# Patient Record
Sex: FEMALE | Race: BLACK OR AFRICAN AMERICAN | NOT HISPANIC OR LATINO | Employment: OTHER | URBAN - METROPOLITAN AREA
[De-identification: names, ages, dates, MRNs, and addresses within clinical notes are randomized per-mention and may not be internally consistent; named-entity substitution may affect disease eponyms.]

---

## 2019-10-28 DIAGNOSIS — I89.0 LYMPHEDEMA: Primary | ICD-10-CM

## 2021-02-09 ENCOUNTER — IMMUNIZATION (OUTPATIENT)
Dept: FAMILY MEDICINE | Facility: CLINIC | Age: 71
End: 2021-02-09
Payer: MEDICARE

## 2021-02-09 DIAGNOSIS — Z23 NEED FOR VACCINATION: Primary | ICD-10-CM

## 2021-02-09 PROCEDURE — 91300 COVID-19, MRNA, LNP-S, PF, 30 MCG/0.3 ML DOSE VACCINE: CPT | Mod: PBBFAC | Performed by: FAMILY MEDICINE

## 2021-02-18 ENCOUNTER — CLINICAL SUPPORT (OUTPATIENT)
Dept: CARDIOLOGY | Facility: HOSPITAL | Age: 71
DRG: 177 | End: 2021-02-18
Attending: INTERNAL MEDICINE
Payer: MEDICARE

## 2021-02-18 ENCOUNTER — HOSPITAL ENCOUNTER (INPATIENT)
Facility: HOSPITAL | Age: 71
LOS: 1 days | Discharge: HOME | DRG: 177 | End: 2021-02-19
Attending: EMERGENCY MEDICINE | Admitting: INTERNAL MEDICINE
Payer: MEDICARE

## 2021-02-18 DIAGNOSIS — R07.9 CHEST PAIN: ICD-10-CM

## 2021-02-18 DIAGNOSIS — U07.1 COVID-19 VIRUS DETECTED: ICD-10-CM

## 2021-02-18 DIAGNOSIS — R55 SYNCOPE, UNSPECIFIED SYNCOPE TYPE: Primary | ICD-10-CM

## 2021-02-18 DIAGNOSIS — J18.9 BILATERAL PNEUMONIA: ICD-10-CM

## 2021-02-18 DIAGNOSIS — U07.1 COVID-19 VIRUS INFECTION: ICD-10-CM

## 2021-02-18 DIAGNOSIS — R55 SYNCOPE: ICD-10-CM

## 2021-02-18 PROBLEM — E78.5 HYPERLIPIDEMIA: Status: ACTIVE | Noted: 2021-02-18

## 2021-02-18 PROBLEM — I10 ESSENTIAL HYPERTENSION: Status: ACTIVE | Noted: 2021-02-18

## 2021-02-18 PROBLEM — E87.6 HYPOKALEMIA: Status: ACTIVE | Noted: 2021-02-18

## 2021-02-18 LAB
ALBUMIN SERPL BCP-MCNC: 3.4 G/DL (ref 3.5–5.2)
ALBUMIN SERPL BCP-MCNC: ABNORMAL G/DL
ALP SERPL-CCNC: 82 U/L (ref 55–135)
ALT SERPL W/O P-5'-P-CCNC: 26 U/L (ref 10–44)
ALT SERPL W/O P-5'-P-CCNC: ABNORMAL U/L
ANION GAP SERPL CALC-SCNC: 12 MMOL/L (ref 8–16)
AST SERPL-CCNC: 32 U/L (ref 10–40)
BASOPHILS # BLD AUTO: 0.01 K/UL (ref 0–0.2)
BASOPHILS NFR BLD: 0.3 % (ref 0–1.9)
BILIRUB SERPL-MCNC: 0.9 MG/DL (ref 0.1–1)
BILIRUB SERPL-MCNC: ABNORMAL MG/DL
BNP SERPL-MCNC: 23 PG/ML (ref 0–99)
BUN SERPL-MCNC: 14 MG/DL (ref 8–23)
BUN SERPL-MCNC: ABNORMAL MG/DL
CALCIUM SERPL-MCNC: 8.5 MG/DL (ref 8.7–10.5)
CHLORIDE SERPL-SCNC: 99 MMOL/L (ref 95–110)
CK MB SERPL-MCNC: 1.3 NG/ML (ref 0.1–6.5)
CK SERPL-CCNC: 115 U/L (ref 20–180)
CO2 SERPL-SCNC: 20 MMOL/L (ref 23–29)
CREAT SERPL-MCNC: 0.9 MG/DL (ref 0.5–1.4)
CREAT SERPL-MCNC: ABNORMAL MG/DL
D DIMER PPP IA.FEU-MCNC: 12.29 UG/ML FEU
DIFFERENTIAL METHOD: ABNORMAL
EOSINOPHIL # BLD AUTO: 0 K/UL (ref 0–0.5)
EOSINOPHIL NFR BLD: 0.3 % (ref 0–8)
ERYTHROCYTE [DISTWIDTH] IN BLOOD BY AUTOMATED COUNT: 13.6 % (ref 11.5–14.5)
EST. GFR  (AFRICAN AMERICAN): >60 ML/MIN/1.73 M^2
EST. GFR  (NON AFRICAN AMERICAN): >60 ML/MIN/1.73 M^2
GLUCOSE SERPL-MCNC: 101 MG/DL (ref 70–110)
HCT VFR BLD AUTO: 42.9 % (ref 37–48.5)
HGB BLD-MCNC: 14.1 G/DL (ref 12–16)
IMM GRANULOCYTES # BLD AUTO: 0.03 K/UL (ref 0–0.04)
IMM GRANULOCYTES NFR BLD AUTO: 0.9 % (ref 0–0.5)
LYMPHOCYTES # BLD AUTO: 0.7 K/UL (ref 1–4.8)
LYMPHOCYTES NFR BLD: 20.5 % (ref 18–48)
MCH RBC QN AUTO: 29 PG (ref 27–31)
MCHC RBC AUTO-ENTMCNC: 32.9 G/DL (ref 32–36)
MCV RBC AUTO: 88 FL (ref 82–98)
MONOCYTES # BLD AUTO: 0.2 K/UL (ref 0.3–1)
MONOCYTES NFR BLD: 4.8 % (ref 4–15)
NEUTROPHILS # BLD AUTO: 2.6 K/UL (ref 1.8–7.7)
NEUTROPHILS NFR BLD: 73.2 % (ref 38–73)
NRBC BLD-RTO: 0 /100 WBC
PLATELET # BLD AUTO: 153 K/UL (ref 150–350)
PMV BLD AUTO: 11.5 FL (ref 9.2–12.9)
POTASSIUM SERPL-SCNC: 3.4 MMOL/L (ref 3.5–5.1)
PROT SERPL-MCNC: 7.7 G/DL (ref 6–8.4)
PROT SERPL-MCNC: ABNORMAL G/DL
RBC # BLD AUTO: 4.87 M/UL (ref 4–5.4)
SARS-COV-2 RDRP RESP QL NAA+PROBE: POSITIVE
SODIUM SERPL-SCNC: 131 MMOL/L (ref 136–145)
TROPONIN I SERPL DL<=0.01 NG/ML-MCNC: <0.03 NG/ML
TROPONIN I SERPL DL<=0.01 NG/ML-MCNC: <0.03 NG/ML
TSH SERPL DL<=0.005 MIU/L-ACNC: 1.12 UIU/ML (ref 0.34–5.6)
WBC # BLD AUTO: 3.52 K/UL (ref 3.9–12.7)

## 2021-02-18 PROCEDURE — 84443 ASSAY THYROID STIM HORMONE: CPT

## 2021-02-18 PROCEDURE — 36415 COLL VENOUS BLD VENIPUNCTURE: CPT

## 2021-02-18 PROCEDURE — 93010 EKG 12-LEAD: ICD-10-PCS | Mod: ,,, | Performed by: INTERNAL MEDICINE

## 2021-02-18 PROCEDURE — 21400001 HC TELEMETRY ROOM

## 2021-02-18 PROCEDURE — U0002 COVID-19 LAB TEST NON-CDC: HCPCS

## 2021-02-18 PROCEDURE — 85025 COMPLETE CBC W/AUTO DIFF WBC: CPT

## 2021-02-18 PROCEDURE — 94761 N-INVAS EAR/PLS OXIMETRY MLT: CPT

## 2021-02-18 PROCEDURE — 85379 FIBRIN DEGRADATION QUANT: CPT

## 2021-02-18 PROCEDURE — 82565 ASSAY OF CREATININE: CPT

## 2021-02-18 PROCEDURE — 83880 ASSAY OF NATRIURETIC PEPTIDE: CPT

## 2021-02-18 PROCEDURE — 63600175 PHARM REV CODE 636 W HCPCS: Performed by: INTERNAL MEDICINE

## 2021-02-18 PROCEDURE — 25000242 PHARM REV CODE 250 ALT 637 W/ HCPCS: Performed by: INTERNAL MEDICINE

## 2021-02-18 PROCEDURE — 25500020 PHARM REV CODE 255: Performed by: INTERNAL MEDICINE

## 2021-02-18 PROCEDURE — 82553 CREATINE MB FRACTION: CPT

## 2021-02-18 PROCEDURE — 84484 ASSAY OF TROPONIN QUANT: CPT | Mod: 91

## 2021-02-18 PROCEDURE — 84484 ASSAY OF TROPONIN QUANT: CPT

## 2021-02-18 PROCEDURE — 93306 ECHO (CUPID ONLY): ICD-10-PCS | Mod: 26,,, | Performed by: SPECIALIST

## 2021-02-18 PROCEDURE — 93005 ELECTROCARDIOGRAM TRACING: CPT | Performed by: INTERNAL MEDICINE

## 2021-02-18 PROCEDURE — 99285 EMERGENCY DEPT VISIT HI MDM: CPT | Mod: 25

## 2021-02-18 PROCEDURE — 82550 ASSAY OF CK (CPK): CPT

## 2021-02-18 PROCEDURE — 93010 ELECTROCARDIOGRAM REPORT: CPT | Mod: ,,, | Performed by: INTERNAL MEDICINE

## 2021-02-18 PROCEDURE — 84520 ASSAY OF UREA NITROGEN: CPT

## 2021-02-18 PROCEDURE — 80053 COMPREHEN METABOLIC PANEL: CPT

## 2021-02-18 PROCEDURE — 82247 BILIRUBIN TOTAL: CPT

## 2021-02-18 PROCEDURE — 25000003 PHARM REV CODE 250: Performed by: EMERGENCY MEDICINE

## 2021-02-18 PROCEDURE — 99900035 HC TECH TIME PER 15 MIN (STAT)

## 2021-02-18 PROCEDURE — 84155 ASSAY OF PROTEIN SERUM: CPT

## 2021-02-18 PROCEDURE — 25000003 PHARM REV CODE 250: Performed by: INTERNAL MEDICINE

## 2021-02-18 PROCEDURE — 84460 ALANINE AMINO (ALT) (SGPT): CPT

## 2021-02-18 PROCEDURE — 93306 TTE W/DOPPLER COMPLETE: CPT | Mod: 26,,, | Performed by: SPECIALIST

## 2021-02-18 PROCEDURE — 99900031 HC PATIENT EDUCATION (STAT)

## 2021-02-18 PROCEDURE — 93306 TTE W/DOPPLER COMPLETE: CPT

## 2021-02-18 PROCEDURE — 82040 ASSAY OF SERUM ALBUMIN: CPT

## 2021-02-18 RX ORDER — ENOXAPARIN SODIUM 100 MG/ML
40 INJECTION SUBCUTANEOUS EVERY 24 HOURS
Status: DISCONTINUED | OUTPATIENT
Start: 2021-02-18 | End: 2021-02-18

## 2021-02-18 RX ORDER — ENOXAPARIN SODIUM 100 MG/ML
1 INJECTION SUBCUTANEOUS
Status: DISCONTINUED | OUTPATIENT
Start: 2021-02-18 | End: 2021-02-19 | Stop reason: HOSPADM

## 2021-02-18 RX ORDER — ONDANSETRON 2 MG/ML
4 INJECTION INTRAMUSCULAR; INTRAVENOUS EVERY 8 HOURS PRN
Status: DISCONTINUED | OUTPATIENT
Start: 2021-02-18 | End: 2021-02-19 | Stop reason: HOSPADM

## 2021-02-18 RX ORDER — ENOXAPARIN SODIUM 300 MG/3ML
1 INJECTION INTRAVENOUS; SUBCUTANEOUS
Status: DISCONTINUED | OUTPATIENT
Start: 2021-02-18 | End: 2021-02-18

## 2021-02-18 RX ORDER — OLMESARTAN MEDOXOMIL AND HYDROCHLOROTHIAZIDE 20/12.5 20; 12.5 MG/1; MG/1
1 TABLET ORAL DAILY
COMMUNITY

## 2021-02-18 RX ORDER — ACETAMINOPHEN 325 MG/1
650 TABLET ORAL EVERY 4 HOURS PRN
Status: DISCONTINUED | OUTPATIENT
Start: 2021-02-18 | End: 2021-02-19 | Stop reason: HOSPADM

## 2021-02-18 RX ORDER — TRAMADOL HYDROCHLORIDE 50 MG/1
50 TABLET ORAL EVERY 6 HOURS PRN
Status: DISCONTINUED | OUTPATIENT
Start: 2021-02-18 | End: 2021-02-19 | Stop reason: HOSPADM

## 2021-02-18 RX ORDER — ACETAMINOPHEN 325 MG/1
650 TABLET ORAL EVERY 8 HOURS PRN
Status: DISCONTINUED | OUTPATIENT
Start: 2021-02-18 | End: 2021-02-19 | Stop reason: HOSPADM

## 2021-02-18 RX ORDER — ENOXAPARIN SODIUM 300 MG/3ML
110 INJECTION INTRAVENOUS; SUBCUTANEOUS
Status: DISCONTINUED | OUTPATIENT
Start: 2021-02-18 | End: 2021-02-18

## 2021-02-18 RX ORDER — SIMVASTATIN 20 MG/1
20 TABLET, FILM COATED ORAL NIGHTLY
COMMUNITY
End: 2021-02-25

## 2021-02-18 RX ADMIN — IOHEXOL 100 ML: 350 INJECTION, SOLUTION INTRAVENOUS at 03:02

## 2021-02-18 RX ADMIN — TRAMADOL HYDROCHLORIDE 50 MG: 50 TABLET, FILM COATED ORAL at 02:02

## 2021-02-18 RX ADMIN — POTASSIUM BICARBONATE 50 MEQ: 977.5 TABLET, EFFERVESCENT ORAL at 11:02

## 2021-02-18 RX ADMIN — DEXAMETHASONE 10 MG: 4 TABLET ORAL at 04:02

## 2021-02-18 RX ADMIN — ENOXAPARIN SODIUM 110 MG: 100 INJECTION SUBCUTANEOUS at 05:02

## 2021-02-19 VITALS
WEIGHT: 229.5 LBS | RESPIRATION RATE: 20 BRPM | OXYGEN SATURATION: 96 % | BODY MASS INDEX: 38.24 KG/M2 | DIASTOLIC BLOOD PRESSURE: 75 MMHG | HEIGHT: 65 IN | SYSTOLIC BLOOD PRESSURE: 147 MMHG | TEMPERATURE: 98 F | HEART RATE: 81 BPM

## 2021-02-19 DIAGNOSIS — U07.1 COVID-19 VIRUS DETECTED: ICD-10-CM

## 2021-02-19 PROBLEM — J18.9 BILATERAL PNEUMONIA: Status: RESOLVED | Noted: 2021-02-18 | Resolved: 2021-02-19

## 2021-02-19 PROBLEM — E87.6 HYPOKALEMIA: Status: RESOLVED | Noted: 2021-02-18 | Resolved: 2021-02-19

## 2021-02-19 PROBLEM — R55 SYNCOPE: Status: RESOLVED | Noted: 2021-02-18 | Resolved: 2021-02-19

## 2021-02-19 LAB
ANION GAP SERPL CALC-SCNC: 12 MMOL/L (ref 8–16)
AORTIC ROOT ANNULUS: 2.53 CM
AORTIC VALVE CUSP SEPERATION: 1.67 CM
AV INDEX (PROSTH): 0.97
AV MEAN GRADIENT: 5 MMHG
AV PEAK GRADIENT: 8 MMHG
AV VALVE AREA: 3.03 CM2
AV VELOCITY RATIO: 83.76
BASOPHILS # BLD AUTO: 0.01 K/UL (ref 0–0.2)
BASOPHILS NFR BLD: 0.4 % (ref 0–1.9)
BSA FOR ECHO PROCEDURE: 2.2 M2
BUN SERPL-MCNC: 17 MG/DL (ref 8–23)
CALCIUM SERPL-MCNC: 8.8 MG/DL (ref 8.7–10.5)
CHLORIDE SERPL-SCNC: 99 MMOL/L (ref 95–110)
CO2 SERPL-SCNC: 22 MMOL/L (ref 23–29)
CREAT SERPL-MCNC: 0.9 MG/DL (ref 0.5–1.4)
CV ECHO LV RWT: 0.44 CM
D DIMER PPP IA.FEU-MCNC: 2.99 UG/ML FEU
DIFFERENTIAL METHOD: ABNORMAL
DOP CALC AO PEAK VEL: 1.43 M/S
DOP CALC AO VTI: 24.31 CM
DOP CALC LVOT AREA: 3.1 CM2
DOP CALC LVOT DIAMETER: 2 CM
DOP CALC LVOT PEAK VEL: 119.78 M/S
DOP CALC LVOT STROKE VOLUME: 73.66 CM3
DOP CALCLVOT PEAK VEL VTI: 23.46 CM
E WAVE DECELERATION TIME: 293.61 MSEC
E/A RATIO: 0.59
E/E' RATIO: 6.67 M/S
ECHO LV POSTERIOR WALL: 0.81 CM (ref 0.6–1.1)
EOSINOPHIL # BLD AUTO: 0 K/UL (ref 0–0.5)
EOSINOPHIL NFR BLD: 0 % (ref 0–8)
ERYTHROCYTE [DISTWIDTH] IN BLOOD BY AUTOMATED COUNT: 13.4 % (ref 11.5–14.5)
EST. GFR  (AFRICAN AMERICAN): >60 ML/MIN/1.73 M^2
EST. GFR  (NON AFRICAN AMERICAN): >60 ML/MIN/1.73 M^2
FRACTIONAL SHORTENING: 39 % (ref 28–44)
GLUCOSE SERPL-MCNC: 135 MG/DL (ref 70–110)
HCT VFR BLD AUTO: 40.8 % (ref 37–48.5)
HGB BLD-MCNC: 13.7 G/DL (ref 12–16)
IMM GRANULOCYTES # BLD AUTO: 0.01 K/UL (ref 0–0.04)
IMM GRANULOCYTES NFR BLD AUTO: 0.4 % (ref 0–0.5)
INTERVENTRICULAR SEPTUM: 0.83 CM (ref 0.6–1.1)
IVRT: 78.73 MSEC
LEFT ATRIUM SIZE: 2.5 CM
LEFT INTERNAL DIMENSION IN SYSTOLE: 2.24 CM (ref 2.1–4)
LEFT VENTRICLE DIASTOLIC VOLUME INDEX: 34.32 ML/M2
LEFT VENTRICLE DIASTOLIC VOLUME: 72.07 ML
LEFT VENTRICLE MASS INDEX: 40 G/M2
LEFT VENTRICLE SYSTOLIC VOLUME INDEX: 13.5 ML/M2
LEFT VENTRICLE SYSTOLIC VOLUME: 28.28 ML
LEFT VENTRICULAR INTERNAL DIMENSION IN DIASTOLE: 3.68 CM (ref 3.5–6)
LEFT VENTRICULAR MASS: 84.41 G
LV LATERAL E/E' RATIO: 7.14 M/S
LV SEPTAL E/E' RATIO: 6.25 M/S
LYMPHOCYTES # BLD AUTO: 0.7 K/UL (ref 1–4.8)
LYMPHOCYTES NFR BLD: 29.5 % (ref 18–48)
MAGNESIUM SERPL-MCNC: 2.1 MG/DL (ref 1.6–2.6)
MCH RBC QN AUTO: 29 PG (ref 27–31)
MCHC RBC AUTO-ENTMCNC: 33.6 G/DL (ref 32–36)
MCV RBC AUTO: 86 FL (ref 82–98)
MONOCYTES # BLD AUTO: 0.1 K/UL (ref 0.3–1)
MONOCYTES NFR BLD: 3.8 % (ref 4–15)
MV PEAK A VEL: 0.85 M/S
MV PEAK E VEL: 0.5 M/S
NEUTROPHILS # BLD AUTO: 1.5 K/UL (ref 1.8–7.7)
NEUTROPHILS NFR BLD: 65.9 % (ref 38–73)
NRBC BLD-RTO: 0 /100 WBC
PLATELET # BLD AUTO: 162 K/UL (ref 150–350)
PMV BLD AUTO: 11.1 FL (ref 9.2–12.9)
POTASSIUM SERPL-SCNC: 4.1 MMOL/L (ref 3.5–5.1)
PV PEAK VELOCITY: 96.56 CM/S
RA PRESSURE: 3 MMHG
RBC # BLD AUTO: 4.72 M/UL (ref 4–5.4)
RIGHT VENTRICULAR END-DIASTOLIC DIMENSION: 237 CM
SODIUM SERPL-SCNC: 133 MMOL/L (ref 136–145)
TDI LATERAL: 0.07 M/S
TDI SEPTAL: 0.08 M/S
TDI: 0.08 M/S
WBC # BLD AUTO: 2.34 K/UL (ref 3.9–12.7)

## 2021-02-19 PROCEDURE — 36415 COLL VENOUS BLD VENIPUNCTURE: CPT

## 2021-02-19 PROCEDURE — 99223 1ST HOSP IP/OBS HIGH 75: CPT | Mod: ,,, | Performed by: SPECIALIST

## 2021-02-19 PROCEDURE — 83735 ASSAY OF MAGNESIUM: CPT

## 2021-02-19 PROCEDURE — 25000242 PHARM REV CODE 250 ALT 637 W/ HCPCS: Performed by: INTERNAL MEDICINE

## 2021-02-19 PROCEDURE — 85025 COMPLETE CBC W/AUTO DIFF WBC: CPT

## 2021-02-19 PROCEDURE — 85379 FIBRIN DEGRADATION QUANT: CPT

## 2021-02-19 PROCEDURE — 99900035 HC TECH TIME PER 15 MIN (STAT)

## 2021-02-19 PROCEDURE — 94761 N-INVAS EAR/PLS OXIMETRY MLT: CPT

## 2021-02-19 PROCEDURE — 63600175 PHARM REV CODE 636 W HCPCS: Performed by: INTERNAL MEDICINE

## 2021-02-19 PROCEDURE — 99223 PR INITIAL HOSPITAL CARE,LEVL III: ICD-10-PCS | Mod: ,,, | Performed by: SPECIALIST

## 2021-02-19 PROCEDURE — 80048 BASIC METABOLIC PNL TOTAL CA: CPT

## 2021-02-19 RX ORDER — TRAMADOL HYDROCHLORIDE 50 MG/1
50 TABLET ORAL EVERY 6 HOURS PRN
Qty: 20 EACH | Refills: 0 | Status: ON HOLD | OUTPATIENT
Start: 2021-02-19 | End: 2021-03-02

## 2021-02-19 RX ADMIN — DEXAMETHASONE 10 MG: 4 TABLET ORAL at 08:02

## 2021-02-19 RX ADMIN — ENOXAPARIN SODIUM 110 MG: 100 INJECTION SUBCUTANEOUS at 05:02

## 2021-02-25 ENCOUNTER — HOSPITAL ENCOUNTER (INPATIENT)
Facility: HOSPITAL | Age: 71
LOS: 5 days | Discharge: HOME | DRG: 177 | End: 2021-03-02
Attending: EMERGENCY MEDICINE | Admitting: INTERNAL MEDICINE
Payer: MEDICARE

## 2021-02-25 DIAGNOSIS — J96.01 ACUTE RESPIRATORY FAILURE WITH HYPOXIA: ICD-10-CM

## 2021-02-25 DIAGNOSIS — Z20.822 SUSPECTED COVID-19 VIRUS INFECTION: Primary | ICD-10-CM

## 2021-02-25 DIAGNOSIS — U07.1 PNEUMONIA DUE TO COVID-19 VIRUS: ICD-10-CM

## 2021-02-25 DIAGNOSIS — J12.82 PNEUMONIA DUE TO COVID-19 VIRUS: ICD-10-CM

## 2021-02-25 LAB
ALBUMIN SERPL BCP-MCNC: 2.8 G/DL (ref 3.5–5.2)
ALP SERPL-CCNC: 68 U/L (ref 55–135)
ALT SERPL W/O P-5'-P-CCNC: 24 U/L (ref 10–44)
ANION GAP SERPL CALC-SCNC: 12 MMOL/L (ref 8–16)
AST SERPL-CCNC: 38 U/L (ref 10–40)
BASOPHILS # BLD AUTO: 0.01 K/UL (ref 0–0.2)
BASOPHILS NFR BLD: 0.2 % (ref 0–1.9)
BILIRUB SERPL-MCNC: 0.9 MG/DL (ref 0.1–1)
BNP SERPL-MCNC: 23 PG/ML (ref 0–99)
BUN SERPL-MCNC: 30 MG/DL (ref 8–23)
CALCIUM SERPL-MCNC: 8.1 MG/DL (ref 8.7–10.5)
CHLORIDE SERPL-SCNC: 93 MMOL/L (ref 95–110)
CK SERPL-CCNC: 51 U/L (ref 20–180)
CO2 SERPL-SCNC: 25 MMOL/L (ref 23–29)
CREAT SERPL-MCNC: 1 MG/DL (ref 0.5–1.4)
CRP SERPL-MCNC: 26.97 MG/DL
D DIMER PPP IA.FEU-MCNC: 1 UG/ML FEU
DIFFERENTIAL METHOD: ABNORMAL
EOSINOPHIL # BLD AUTO: 0 K/UL (ref 0–0.5)
EOSINOPHIL NFR BLD: 0.2 % (ref 0–8)
ERYTHROCYTE [DISTWIDTH] IN BLOOD BY AUTOMATED COUNT: 13.7 % (ref 11.5–14.5)
EST. GFR  (AFRICAN AMERICAN): >60 ML/MIN/1.73 M^2
EST. GFR  (NON AFRICAN AMERICAN): 56.8 ML/MIN/1.73 M^2
FERRITIN SERPL-MCNC: 2896 NG/ML (ref 20–300)
GLUCOSE SERPL-MCNC: 114 MG/DL (ref 70–110)
HCT VFR BLD AUTO: 41.2 % (ref 37–48.5)
HGB BLD-MCNC: 13.7 G/DL (ref 12–16)
IMM GRANULOCYTES # BLD AUTO: 0.05 K/UL (ref 0–0.04)
IMM GRANULOCYTES NFR BLD AUTO: 0.9 % (ref 0–0.5)
LACTATE SERPL-SCNC: 1.4 MMOL/L (ref 0.5–1.9)
LDH SERPL L TO P-CCNC: 332 U/L (ref 110–260)
LYMPHOCYTES # BLD AUTO: 1.1 K/UL (ref 1–4.8)
LYMPHOCYTES NFR BLD: 19.4 % (ref 18–48)
MCH RBC QN AUTO: 29.1 PG (ref 27–31)
MCHC RBC AUTO-ENTMCNC: 33.3 G/DL (ref 32–36)
MCV RBC AUTO: 88 FL (ref 82–98)
MONOCYTES # BLD AUTO: 0.2 K/UL (ref 0.3–1)
MONOCYTES NFR BLD: 4.4 % (ref 4–15)
NEUTROPHILS # BLD AUTO: 4.1 K/UL (ref 1.8–7.7)
NEUTROPHILS NFR BLD: 74.9 % (ref 38–73)
NRBC BLD-RTO: 0 /100 WBC
PLATELET # BLD AUTO: 268 K/UL (ref 150–350)
PMV BLD AUTO: 10.4 FL (ref 9.2–12.9)
POTASSIUM SERPL-SCNC: 3.4 MMOL/L (ref 3.5–5.1)
PROCALCITONIN SERPL IA-MCNC: 0.75 NG/ML (ref 0–0.5)
PROT SERPL-MCNC: 7.8 G/DL (ref 6–8.4)
RBC # BLD AUTO: 4.71 M/UL (ref 4–5.4)
SODIUM SERPL-SCNC: 130 MMOL/L (ref 136–145)
TROPONIN I SERPL DL<=0.01 NG/ML-MCNC: <0.03 NG/ML
WBC # BLD AUTO: 5.45 K/UL (ref 3.9–12.7)

## 2021-02-25 PROCEDURE — 82728 ASSAY OF FERRITIN: CPT

## 2021-02-25 PROCEDURE — 84484 ASSAY OF TROPONIN QUANT: CPT

## 2021-02-25 PROCEDURE — 87040 BLOOD CULTURE FOR BACTERIA: CPT

## 2021-02-25 PROCEDURE — 80053 COMPREHEN METABOLIC PANEL: CPT

## 2021-02-25 PROCEDURE — 85379 FIBRIN DEGRADATION QUANT: CPT

## 2021-02-25 PROCEDURE — 83605 ASSAY OF LACTIC ACID: CPT

## 2021-02-25 PROCEDURE — 82550 ASSAY OF CK (CPK): CPT

## 2021-02-25 PROCEDURE — 36415 COLL VENOUS BLD VENIPUNCTURE: CPT

## 2021-02-25 PROCEDURE — 83880 ASSAY OF NATRIURETIC PEPTIDE: CPT

## 2021-02-25 PROCEDURE — 85025 COMPLETE CBC W/AUTO DIFF WBC: CPT

## 2021-02-25 PROCEDURE — 21400001 HC TELEMETRY ROOM

## 2021-02-25 PROCEDURE — 99285 EMERGENCY DEPT VISIT HI MDM: CPT | Mod: 25

## 2021-02-25 PROCEDURE — 83615 LACTATE (LD) (LDH) ENZYME: CPT

## 2021-02-25 PROCEDURE — 86140 C-REACTIVE PROTEIN: CPT

## 2021-02-25 PROCEDURE — 84145 PROCALCITONIN (PCT): CPT

## 2021-02-25 PROCEDURE — 96374 THER/PROPH/DIAG INJ IV PUSH: CPT

## 2021-02-25 PROCEDURE — 63600175 PHARM REV CODE 636 W HCPCS: Performed by: EMERGENCY MEDICINE

## 2021-02-25 RX ORDER — ALBUTEROL SULFATE 90 UG/1
2 AEROSOL, METERED RESPIRATORY (INHALATION) EVERY 8 HOURS
Status: DISCONTINUED | OUTPATIENT
Start: 2021-02-25 | End: 2021-02-26

## 2021-02-25 RX ORDER — DEXAMETHASONE SODIUM PHOSPHATE 4 MG/ML
6 INJECTION, SOLUTION INTRA-ARTICULAR; INTRALESIONAL; INTRAMUSCULAR; INTRAVENOUS; SOFT TISSUE
Status: COMPLETED | OUTPATIENT
Start: 2021-02-25 | End: 2021-02-25

## 2021-02-25 RX ADMIN — DEXAMETHASONE SODIUM PHOSPHATE 6 MG: 4 INJECTION, SOLUTION INTRA-ARTICULAR; INTRALESIONAL; INTRAMUSCULAR; INTRAVENOUS; SOFT TISSUE at 09:02

## 2021-02-26 LAB
ALBUMIN SERPL BCP-MCNC: 2.8 G/DL (ref 3.5–5.2)
ALP SERPL-CCNC: 69 U/L (ref 55–135)
ALT SERPL W/O P-5'-P-CCNC: 24 U/L (ref 10–44)
ANION GAP SERPL CALC-SCNC: 11 MMOL/L (ref 8–16)
AST SERPL-CCNC: 35 U/L (ref 10–40)
BASOPHILS # BLD AUTO: 0.02 K/UL (ref 0–0.2)
BASOPHILS NFR BLD: 0.4 % (ref 0–1.9)
BILIRUB SERPL-MCNC: 1 MG/DL (ref 0.1–1)
BUN SERPL-MCNC: 34 MG/DL (ref 8–23)
CALCIUM SERPL-MCNC: 8.3 MG/DL (ref 8.7–10.5)
CHLORIDE SERPL-SCNC: 95 MMOL/L (ref 95–110)
CO2 SERPL-SCNC: 25 MMOL/L (ref 23–29)
CREAT SERPL-MCNC: 1 MG/DL (ref 0.5–1.4)
DIFFERENTIAL METHOD: ABNORMAL
EOSINOPHIL # BLD AUTO: 0 K/UL (ref 0–0.5)
EOSINOPHIL NFR BLD: 0 % (ref 0–8)
ERYTHROCYTE [DISTWIDTH] IN BLOOD BY AUTOMATED COUNT: 13.9 % (ref 11.5–14.5)
EST. GFR  (AFRICAN AMERICAN): >60 ML/MIN/1.73 M^2
EST. GFR  (NON AFRICAN AMERICAN): 56.8 ML/MIN/1.73 M^2
GLUCOSE SERPL-MCNC: 151 MG/DL (ref 70–110)
HCT VFR BLD AUTO: 40.3 % (ref 37–48.5)
HGB BLD-MCNC: 13.5 G/DL (ref 12–16)
IMM GRANULOCYTES # BLD AUTO: 0.03 K/UL (ref 0–0.04)
IMM GRANULOCYTES NFR BLD AUTO: 0.6 % (ref 0–0.5)
LYMPHOCYTES # BLD AUTO: 0.8 K/UL (ref 1–4.8)
LYMPHOCYTES NFR BLD: 15.3 % (ref 18–48)
MAGNESIUM SERPL-MCNC: 2.8 MG/DL (ref 1.6–2.6)
MCH RBC QN AUTO: 29.3 PG (ref 27–31)
MCHC RBC AUTO-ENTMCNC: 33.5 G/DL (ref 32–36)
MCV RBC AUTO: 87 FL (ref 82–98)
MONOCYTES # BLD AUTO: 0.1 K/UL (ref 0.3–1)
MONOCYTES NFR BLD: 2.4 % (ref 4–15)
NEUTROPHILS # BLD AUTO: 4 K/UL (ref 1.8–7.7)
NEUTROPHILS NFR BLD: 81.3 % (ref 38–73)
NRBC BLD-RTO: 0 /100 WBC
PHOSPHATE SERPL-MCNC: 3.5 MG/DL (ref 2.7–4.5)
PLATELET # BLD AUTO: 292 K/UL (ref 150–350)
PMV BLD AUTO: 10.8 FL (ref 9.2–12.9)
POTASSIUM SERPL-SCNC: 4.3 MMOL/L (ref 3.5–5.1)
PROT SERPL-MCNC: 7.8 G/DL (ref 6–8.4)
RBC # BLD AUTO: 4.61 M/UL (ref 4–5.4)
SODIUM SERPL-SCNC: 131 MMOL/L (ref 136–145)
WBC # BLD AUTO: 4.97 K/UL (ref 3.9–12.7)

## 2021-02-26 PROCEDURE — 25000242 PHARM REV CODE 250 ALT 637 W/ HCPCS: Performed by: INTERNAL MEDICINE

## 2021-02-26 PROCEDURE — 94640 AIRWAY INHALATION TREATMENT: CPT

## 2021-02-26 PROCEDURE — 25000003 PHARM REV CODE 250: Performed by: INTERNAL MEDICINE

## 2021-02-26 PROCEDURE — 80053 COMPREHEN METABOLIC PANEL: CPT

## 2021-02-26 PROCEDURE — 99900035 HC TECH TIME PER 15 MIN (STAT)

## 2021-02-26 PROCEDURE — 21400001 HC TELEMETRY ROOM

## 2021-02-26 PROCEDURE — 85025 COMPLETE CBC W/AUTO DIFF WBC: CPT

## 2021-02-26 PROCEDURE — 36415 COLL VENOUS BLD VENIPUNCTURE: CPT

## 2021-02-26 PROCEDURE — 83735 ASSAY OF MAGNESIUM: CPT

## 2021-02-26 PROCEDURE — 84100 ASSAY OF PHOSPHORUS: CPT

## 2021-02-26 PROCEDURE — 99900031 HC PATIENT EDUCATION (STAT)

## 2021-02-26 PROCEDURE — 63600175 PHARM REV CODE 636 W HCPCS: Performed by: INTERNAL MEDICINE

## 2021-02-26 PROCEDURE — 94761 N-INVAS EAR/PLS OXIMETRY MLT: CPT

## 2021-02-26 PROCEDURE — 27000221 HC OXYGEN, UP TO 24 HOURS

## 2021-02-26 PROCEDURE — 94799 UNLISTED PULMONARY SVC/PX: CPT

## 2021-02-26 RX ORDER — POTASSIUM CHLORIDE 7.45 MG/ML
40 INJECTION INTRAVENOUS
Status: DISCONTINUED | OUTPATIENT
Start: 2021-02-26 | End: 2021-03-02 | Stop reason: HOSPADM

## 2021-02-26 RX ORDER — POTASSIUM CHLORIDE 20 MEQ/1
40 TABLET, EXTENDED RELEASE ORAL ONCE
Status: COMPLETED | OUTPATIENT
Start: 2021-02-26 | End: 2021-02-26

## 2021-02-26 RX ORDER — MAGNESIUM SULFATE 1 G/100ML
1 INJECTION INTRAVENOUS
Status: DISCONTINUED | OUTPATIENT
Start: 2021-02-26 | End: 2021-03-02 | Stop reason: HOSPADM

## 2021-02-26 RX ORDER — LANOLIN ALCOHOL/MO/W.PET/CERES
800 CREAM (GRAM) TOPICAL
Status: DISCONTINUED | OUTPATIENT
Start: 2021-02-26 | End: 2021-03-02 | Stop reason: HOSPADM

## 2021-02-26 RX ORDER — OLMESARTAN MEDOXOMIL 20 MG/1
20 TABLET ORAL DAILY
Status: DISCONTINUED | OUTPATIENT
Start: 2021-02-26 | End: 2021-03-02 | Stop reason: HOSPADM

## 2021-02-26 RX ORDER — GLUCAGON 1 MG
1 KIT INJECTION
Status: DISCONTINUED | OUTPATIENT
Start: 2021-02-26 | End: 2021-03-02 | Stop reason: HOSPADM

## 2021-02-26 RX ORDER — POTASSIUM CHLORIDE 7.45 MG/ML
20 INJECTION INTRAVENOUS
Status: DISCONTINUED | OUTPATIENT
Start: 2021-02-26 | End: 2021-03-02 | Stop reason: HOSPADM

## 2021-02-26 RX ORDER — IBUPROFEN 200 MG
24 TABLET ORAL
Status: DISCONTINUED | OUTPATIENT
Start: 2021-02-26 | End: 2021-03-02 | Stop reason: HOSPADM

## 2021-02-26 RX ORDER — MAGNESIUM SULFATE HEPTAHYDRATE 40 MG/ML
2 INJECTION, SOLUTION INTRAVENOUS
Status: DISCONTINUED | OUTPATIENT
Start: 2021-02-26 | End: 2021-03-02 | Stop reason: HOSPADM

## 2021-02-26 RX ORDER — POTASSIUM CHLORIDE 20 MEQ/1
20 TABLET, EXTENDED RELEASE ORAL
Status: DISCONTINUED | OUTPATIENT
Start: 2021-02-26 | End: 2021-03-02 | Stop reason: HOSPADM

## 2021-02-26 RX ORDER — TRAMADOL HYDROCHLORIDE 50 MG/1
50 TABLET ORAL EVERY 6 HOURS PRN
Status: DISCONTINUED | OUTPATIENT
Start: 2021-02-26 | End: 2021-03-02 | Stop reason: HOSPADM

## 2021-02-26 RX ORDER — HYDROCHLOROTHIAZIDE 12.5 MG/1
12.5 TABLET ORAL DAILY
Status: DISCONTINUED | OUTPATIENT
Start: 2021-02-26 | End: 2021-03-02 | Stop reason: HOSPADM

## 2021-02-26 RX ORDER — ALBUTEROL SULFATE 90 UG/1
2 AEROSOL, METERED RESPIRATORY (INHALATION) EVERY 6 HOURS PRN
Status: DISCONTINUED | OUTPATIENT
Start: 2021-02-26 | End: 2021-03-01

## 2021-02-26 RX ORDER — ACETAMINOPHEN 325 MG/1
650 TABLET ORAL EVERY 8 HOURS PRN
Status: DISCONTINUED | OUTPATIENT
Start: 2021-02-26 | End: 2021-03-02 | Stop reason: HOSPADM

## 2021-02-26 RX ORDER — POTASSIUM CHLORIDE 20 MEQ/1
40 TABLET, EXTENDED RELEASE ORAL
Status: DISCONTINUED | OUTPATIENT
Start: 2021-02-26 | End: 2021-03-02 | Stop reason: HOSPADM

## 2021-02-26 RX ORDER — ALBUTEROL SULFATE 90 UG/1
2 AEROSOL, METERED RESPIRATORY (INHALATION) EVERY 6 HOURS PRN
Status: DISCONTINUED | OUTPATIENT
Start: 2021-02-26 | End: 2021-02-26

## 2021-02-26 RX ORDER — GUAIFENESIN/DEXTROMETHORPHAN 100-10MG/5
10 SYRUP ORAL EVERY 4 HOURS PRN
Status: DISCONTINUED | OUTPATIENT
Start: 2021-02-26 | End: 2021-03-02 | Stop reason: HOSPADM

## 2021-02-26 RX ORDER — IBUPROFEN 200 MG
16 TABLET ORAL
Status: DISCONTINUED | OUTPATIENT
Start: 2021-02-26 | End: 2021-03-02 | Stop reason: HOSPADM

## 2021-02-26 RX ORDER — SODIUM CHLORIDE 0.9 % (FLUSH) 0.9 %
10 SYRINGE (ML) INJECTION
Status: DISCONTINUED | OUTPATIENT
Start: 2021-02-26 | End: 2021-03-02 | Stop reason: HOSPADM

## 2021-02-26 RX ORDER — MAGNESIUM SULFATE HEPTAHYDRATE 40 MG/ML
4 INJECTION, SOLUTION INTRAVENOUS
Status: DISCONTINUED | OUTPATIENT
Start: 2021-02-26 | End: 2021-03-02 | Stop reason: HOSPADM

## 2021-02-26 RX ADMIN — HYDROCHLOROTHIAZIDE 12.5 MG: 12.5 TABLET ORAL at 08:02

## 2021-02-26 RX ADMIN — POTASSIUM CHLORIDE 40 MEQ: 20 TABLET, EXTENDED RELEASE ORAL at 12:02

## 2021-02-26 RX ADMIN — OLMESARTAN MEDOXOMIL 20 MG: 20 TABLET, FILM COATED ORAL at 08:02

## 2021-02-26 RX ADMIN — APIXABAN 5 MG: 5 TABLET, FILM COATED ORAL at 08:02

## 2021-02-26 RX ADMIN — APIXABAN 5 MG: 5 TABLET, FILM COATED ORAL at 12:02

## 2021-02-26 RX ADMIN — ALBUTEROL SULFATE 2 PUFF: 90 AEROSOL, METERED RESPIRATORY (INHALATION) at 07:02

## 2021-02-26 RX ADMIN — REMDESIVIR 200 MG: 100 INJECTION, POWDER, LYOPHILIZED, FOR SOLUTION INTRAVENOUS at 03:02

## 2021-02-26 RX ADMIN — APIXABAN 5 MG: 5 TABLET, FILM COATED ORAL at 09:02

## 2021-02-26 RX ADMIN — DEXAMETHASONE 6 MG: 4 TABLET ORAL at 08:02

## 2021-02-27 LAB
ALBUMIN SERPL BCP-MCNC: 3 G/DL (ref 3.5–5.2)
ALP SERPL-CCNC: 73 U/L (ref 55–135)
ALT SERPL W/O P-5'-P-CCNC: 29 U/L (ref 10–44)
ANION GAP SERPL CALC-SCNC: 13 MMOL/L (ref 8–16)
AST SERPL-CCNC: 37 U/L (ref 10–40)
BASOPHILS # BLD AUTO: 0.01 K/UL (ref 0–0.2)
BASOPHILS NFR BLD: 0.1 % (ref 0–1.9)
BILIRUB SERPL-MCNC: 0.6 MG/DL (ref 0.1–1)
BUN SERPL-MCNC: 40 MG/DL (ref 8–23)
CALCIUM SERPL-MCNC: 8.9 MG/DL (ref 8.7–10.5)
CHLORIDE SERPL-SCNC: 96 MMOL/L (ref 95–110)
CO2 SERPL-SCNC: 26 MMOL/L (ref 23–29)
CREAT SERPL-MCNC: 1.1 MG/DL (ref 0.5–1.4)
CRP SERPL-MCNC: 13.37 MG/DL
D DIMER PPP IA.FEU-MCNC: 0.7 UG/ML FEU
DIFFERENTIAL METHOD: ABNORMAL
EOSINOPHIL # BLD AUTO: 0 K/UL (ref 0–0.5)
EOSINOPHIL NFR BLD: 0 % (ref 0–8)
ERYTHROCYTE [DISTWIDTH] IN BLOOD BY AUTOMATED COUNT: 13.9 % (ref 11.5–14.5)
EST. GFR  (AFRICAN AMERICAN): 58.4 ML/MIN/1.73 M^2
EST. GFR  (NON AFRICAN AMERICAN): 50.6 ML/MIN/1.73 M^2
FERRITIN SERPL-MCNC: 2922 NG/ML (ref 20–300)
GLUCOSE SERPL-MCNC: 142 MG/DL (ref 70–110)
HCT VFR BLD AUTO: 45.1 % (ref 37–48.5)
HGB BLD-MCNC: 14.6 G/DL (ref 12–16)
IMM GRANULOCYTES # BLD AUTO: 0.05 K/UL (ref 0–0.04)
IMM GRANULOCYTES NFR BLD AUTO: 0.7 % (ref 0–0.5)
LYMPHOCYTES # BLD AUTO: 1.3 K/UL (ref 1–4.8)
LYMPHOCYTES NFR BLD: 16.6 % (ref 18–48)
MAGNESIUM SERPL-MCNC: 2.9 MG/DL (ref 1.6–2.6)
MCH RBC QN AUTO: 28.8 PG (ref 27–31)
MCHC RBC AUTO-ENTMCNC: 32.4 G/DL (ref 32–36)
MCV RBC AUTO: 89 FL (ref 82–98)
MONOCYTES # BLD AUTO: 0.5 K/UL (ref 0.3–1)
MONOCYTES NFR BLD: 6 % (ref 4–15)
NEUTROPHILS # BLD AUTO: 5.9 K/UL (ref 1.8–7.7)
NEUTROPHILS NFR BLD: 76.6 % (ref 38–73)
NRBC BLD-RTO: 0 /100 WBC
PHOSPHATE SERPL-MCNC: 3.5 MG/DL (ref 2.7–4.5)
PLATELET # BLD AUTO: 325 K/UL (ref 150–350)
PMV BLD AUTO: 10.8 FL (ref 9.2–12.9)
POTASSIUM SERPL-SCNC: 4.2 MMOL/L (ref 3.5–5.1)
PROT SERPL-MCNC: 8 G/DL (ref 6–8.4)
RBC # BLD AUTO: 5.07 M/UL (ref 4–5.4)
SODIUM SERPL-SCNC: 135 MMOL/L (ref 136–145)
WBC # BLD AUTO: 7.64 K/UL (ref 3.9–12.7)

## 2021-02-27 PROCEDURE — 85025 COMPLETE CBC W/AUTO DIFF WBC: CPT

## 2021-02-27 PROCEDURE — 21400001 HC TELEMETRY ROOM

## 2021-02-27 PROCEDURE — 36415 COLL VENOUS BLD VENIPUNCTURE: CPT

## 2021-02-27 PROCEDURE — 99900035 HC TECH TIME PER 15 MIN (STAT)

## 2021-02-27 PROCEDURE — 94761 N-INVAS EAR/PLS OXIMETRY MLT: CPT

## 2021-02-27 PROCEDURE — 27000221 HC OXYGEN, UP TO 24 HOURS

## 2021-02-27 PROCEDURE — 84100 ASSAY OF PHOSPHORUS: CPT

## 2021-02-27 PROCEDURE — 83735 ASSAY OF MAGNESIUM: CPT

## 2021-02-27 PROCEDURE — 80053 COMPREHEN METABOLIC PANEL: CPT

## 2021-02-27 PROCEDURE — 94799 UNLISTED PULMONARY SVC/PX: CPT

## 2021-02-27 PROCEDURE — 82728 ASSAY OF FERRITIN: CPT

## 2021-02-27 PROCEDURE — 25000003 PHARM REV CODE 250: Performed by: INTERNAL MEDICINE

## 2021-02-27 PROCEDURE — 86140 C-REACTIVE PROTEIN: CPT

## 2021-02-27 PROCEDURE — 63600175 PHARM REV CODE 636 W HCPCS: Performed by: INTERNAL MEDICINE

## 2021-02-27 PROCEDURE — 25000242 PHARM REV CODE 250 ALT 637 W/ HCPCS: Performed by: INTERNAL MEDICINE

## 2021-02-27 PROCEDURE — 85379 FIBRIN DEGRADATION QUANT: CPT

## 2021-02-27 RX ADMIN — APIXABAN 5 MG: 5 TABLET, FILM COATED ORAL at 10:02

## 2021-02-27 RX ADMIN — DEXAMETHASONE 6 MG: 4 TABLET ORAL at 09:02

## 2021-02-27 RX ADMIN — HYDROCHLOROTHIAZIDE 12.5 MG: 12.5 TABLET ORAL at 09:02

## 2021-02-27 RX ADMIN — OLMESARTAN MEDOXOMIL 20 MG: 20 TABLET, FILM COATED ORAL at 09:02

## 2021-02-27 RX ADMIN — REMDESIVIR 100 MG: 100 INJECTION, POWDER, LYOPHILIZED, FOR SOLUTION INTRAVENOUS at 09:02

## 2021-02-27 RX ADMIN — APIXABAN 5 MG: 5 TABLET, FILM COATED ORAL at 09:02

## 2021-02-28 LAB
ALBUMIN SERPL BCP-MCNC: 2.9 G/DL (ref 3.5–5.2)
ALP SERPL-CCNC: 71 U/L (ref 55–135)
ALT SERPL W/O P-5'-P-CCNC: 32 U/L (ref 10–44)
ANION GAP SERPL CALC-SCNC: 12 MMOL/L (ref 8–16)
AST SERPL-CCNC: 34 U/L (ref 10–40)
BASOPHILS # BLD AUTO: 0.02 K/UL (ref 0–0.2)
BASOPHILS NFR BLD: 0.2 % (ref 0–1.9)
BILIRUB SERPL-MCNC: 0.8 MG/DL (ref 0.1–1)
BUN SERPL-MCNC: 37 MG/DL (ref 8–23)
CALCIUM SERPL-MCNC: 8.5 MG/DL (ref 8.7–10.5)
CHLORIDE SERPL-SCNC: 97 MMOL/L (ref 95–110)
CO2 SERPL-SCNC: 24 MMOL/L (ref 23–29)
CREAT SERPL-MCNC: 0.9 MG/DL (ref 0.5–1.4)
CRP SERPL-MCNC: 5.39 MG/DL
D DIMER PPP IA.FEU-MCNC: 0.5 UG/ML FEU
DIFFERENTIAL METHOD: ABNORMAL
EOSINOPHIL # BLD AUTO: 0 K/UL (ref 0–0.5)
EOSINOPHIL NFR BLD: 0 % (ref 0–8)
ERYTHROCYTE [DISTWIDTH] IN BLOOD BY AUTOMATED COUNT: 13.9 % (ref 11.5–14.5)
EST. GFR  (AFRICAN AMERICAN): >60 ML/MIN/1.73 M^2
EST. GFR  (NON AFRICAN AMERICAN): >60 ML/MIN/1.73 M^2
FERRITIN SERPL-MCNC: 2133 NG/ML (ref 20–300)
GLUCOSE SERPL-MCNC: 129 MG/DL (ref 70–110)
HCT VFR BLD AUTO: 42.4 % (ref 37–48.5)
HGB BLD-MCNC: 14 G/DL (ref 12–16)
IMM GRANULOCYTES # BLD AUTO: 0.08 K/UL (ref 0–0.04)
IMM GRANULOCYTES NFR BLD AUTO: 0.8 % (ref 0–0.5)
LYMPHOCYTES # BLD AUTO: 1.1 K/UL (ref 1–4.8)
LYMPHOCYTES NFR BLD: 11.2 % (ref 18–48)
MAGNESIUM SERPL-MCNC: 2.9 MG/DL (ref 1.6–2.6)
MCH RBC QN AUTO: 28.9 PG (ref 27–31)
MCHC RBC AUTO-ENTMCNC: 33 G/DL (ref 32–36)
MCV RBC AUTO: 87 FL (ref 82–98)
MONOCYTES # BLD AUTO: 0.6 K/UL (ref 0.3–1)
MONOCYTES NFR BLD: 5.5 % (ref 4–15)
NEUTROPHILS # BLD AUTO: 8.2 K/UL (ref 1.8–7.7)
NEUTROPHILS NFR BLD: 82.3 % (ref 38–73)
NRBC BLD-RTO: 0 /100 WBC
PHOSPHATE SERPL-MCNC: 3.5 MG/DL (ref 2.7–4.5)
PLATELET # BLD AUTO: 360 K/UL (ref 150–350)
PMV BLD AUTO: 10.9 FL (ref 9.2–12.9)
POTASSIUM SERPL-SCNC: 3.9 MMOL/L (ref 3.5–5.1)
PROT SERPL-MCNC: 7.4 G/DL (ref 6–8.4)
RBC # BLD AUTO: 4.85 M/UL (ref 4–5.4)
SODIUM SERPL-SCNC: 133 MMOL/L (ref 136–145)
WBC # BLD AUTO: 9.93 K/UL (ref 3.9–12.7)

## 2021-02-28 PROCEDURE — 21400001 HC TELEMETRY ROOM

## 2021-02-28 PROCEDURE — 86140 C-REACTIVE PROTEIN: CPT

## 2021-02-28 PROCEDURE — 99900035 HC TECH TIME PER 15 MIN (STAT)

## 2021-02-28 PROCEDURE — 94761 N-INVAS EAR/PLS OXIMETRY MLT: CPT

## 2021-02-28 PROCEDURE — 25000242 PHARM REV CODE 250 ALT 637 W/ HCPCS: Performed by: INTERNAL MEDICINE

## 2021-02-28 PROCEDURE — 82728 ASSAY OF FERRITIN: CPT

## 2021-02-28 PROCEDURE — 63600175 PHARM REV CODE 636 W HCPCS: Performed by: INTERNAL MEDICINE

## 2021-02-28 PROCEDURE — 84100 ASSAY OF PHOSPHORUS: CPT

## 2021-02-28 PROCEDURE — 80053 COMPREHEN METABOLIC PANEL: CPT

## 2021-02-28 PROCEDURE — 36415 COLL VENOUS BLD VENIPUNCTURE: CPT

## 2021-02-28 PROCEDURE — 85379 FIBRIN DEGRADATION QUANT: CPT

## 2021-02-28 PROCEDURE — 85025 COMPLETE CBC W/AUTO DIFF WBC: CPT

## 2021-02-28 PROCEDURE — 27000221 HC OXYGEN, UP TO 24 HOURS

## 2021-02-28 PROCEDURE — 83735 ASSAY OF MAGNESIUM: CPT

## 2021-02-28 PROCEDURE — 25000003 PHARM REV CODE 250: Performed by: INTERNAL MEDICINE

## 2021-02-28 RX ADMIN — APIXABAN 5 MG: 5 TABLET, FILM COATED ORAL at 10:02

## 2021-02-28 RX ADMIN — REMDESIVIR 100 MG: 100 INJECTION, POWDER, LYOPHILIZED, FOR SOLUTION INTRAVENOUS at 10:02

## 2021-02-28 RX ADMIN — HYDROCHLOROTHIAZIDE 12.5 MG: 12.5 TABLET ORAL at 10:02

## 2021-02-28 RX ADMIN — DEXAMETHASONE 6 MG: 4 TABLET ORAL at 10:02

## 2021-02-28 RX ADMIN — OLMESARTAN MEDOXOMIL 20 MG: 20 TABLET, FILM COATED ORAL at 10:02

## 2021-03-01 LAB
ALBUMIN SERPL BCP-MCNC: 2.8 G/DL (ref 3.5–5.2)
ALP SERPL-CCNC: 76 U/L (ref 55–135)
ALT SERPL W/O P-5'-P-CCNC: 41 U/L (ref 10–44)
ANION GAP SERPL CALC-SCNC: 13 MMOL/L (ref 8–16)
AST SERPL-CCNC: 31 U/L (ref 10–40)
BASOPHILS # BLD AUTO: 0.01 K/UL (ref 0–0.2)
BASOPHILS NFR BLD: 0.1 % (ref 0–1.9)
BILIRUB SERPL-MCNC: 0.9 MG/DL (ref 0.1–1)
BUN SERPL-MCNC: 30 MG/DL (ref 8–23)
CALCIUM SERPL-MCNC: 8.9 MG/DL (ref 8.7–10.5)
CHLORIDE SERPL-SCNC: 99 MMOL/L (ref 95–110)
CO2 SERPL-SCNC: 24 MMOL/L (ref 23–29)
CREAT SERPL-MCNC: 0.8 MG/DL (ref 0.5–1.4)
CRP SERPL-MCNC: 2.67 MG/DL
D DIMER PPP IA.FEU-MCNC: 0.53 UG/ML FEU
DIFFERENTIAL METHOD: ABNORMAL
EOSINOPHIL # BLD AUTO: 0 K/UL (ref 0–0.5)
EOSINOPHIL NFR BLD: 0 % (ref 0–8)
ERYTHROCYTE [DISTWIDTH] IN BLOOD BY AUTOMATED COUNT: 13.8 % (ref 11.5–14.5)
EST. GFR  (AFRICAN AMERICAN): >60 ML/MIN/1.73 M^2
EST. GFR  (NON AFRICAN AMERICAN): >60 ML/MIN/1.73 M^2
FERRITIN SERPL-MCNC: 1455 NG/ML (ref 20–300)
GLUCOSE SERPL-MCNC: 118 MG/DL (ref 70–110)
HCT VFR BLD AUTO: 39.8 % (ref 37–48.5)
HGB BLD-MCNC: 13.3 G/DL (ref 12–16)
IMM GRANULOCYTES # BLD AUTO: 0.06 K/UL (ref 0–0.04)
IMM GRANULOCYTES NFR BLD AUTO: 0.7 % (ref 0–0.5)
LYMPHOCYTES # BLD AUTO: 1.4 K/UL (ref 1–4.8)
LYMPHOCYTES NFR BLD: 15.1 % (ref 18–48)
MAGNESIUM SERPL-MCNC: 2.6 MG/DL (ref 1.6–2.6)
MCH RBC QN AUTO: 29 PG (ref 27–31)
MCHC RBC AUTO-ENTMCNC: 33.4 G/DL (ref 32–36)
MCV RBC AUTO: 87 FL (ref 82–98)
MONOCYTES # BLD AUTO: 0.6 K/UL (ref 0.3–1)
MONOCYTES NFR BLD: 6.5 % (ref 4–15)
NEUTROPHILS # BLD AUTO: 7.1 K/UL (ref 1.8–7.7)
NEUTROPHILS NFR BLD: 77.6 % (ref 38–73)
NRBC BLD-RTO: 0 /100 WBC
PHOSPHATE SERPL-MCNC: 3.9 MG/DL (ref 2.7–4.5)
PLATELET # BLD AUTO: 398 K/UL (ref 150–350)
PMV BLD AUTO: 10.1 FL (ref 9.2–12.9)
POTASSIUM SERPL-SCNC: 4.3 MMOL/L (ref 3.5–5.1)
PROT SERPL-MCNC: 6.9 G/DL (ref 6–8.4)
RBC # BLD AUTO: 4.58 M/UL (ref 4–5.4)
SODIUM SERPL-SCNC: 136 MMOL/L (ref 136–145)
WBC # BLD AUTO: 9.12 K/UL (ref 3.9–12.7)

## 2021-03-01 PROCEDURE — 86140 C-REACTIVE PROTEIN: CPT

## 2021-03-01 PROCEDURE — 84100 ASSAY OF PHOSPHORUS: CPT

## 2021-03-01 PROCEDURE — 83735 ASSAY OF MAGNESIUM: CPT

## 2021-03-01 PROCEDURE — 85379 FIBRIN DEGRADATION QUANT: CPT

## 2021-03-01 PROCEDURE — 63600175 PHARM REV CODE 636 W HCPCS: Performed by: INTERNAL MEDICINE

## 2021-03-01 PROCEDURE — 80053 COMPREHEN METABOLIC PANEL: CPT

## 2021-03-01 PROCEDURE — 36415 COLL VENOUS BLD VENIPUNCTURE: CPT

## 2021-03-01 PROCEDURE — 21400001 HC TELEMETRY ROOM

## 2021-03-01 PROCEDURE — 25000003 PHARM REV CODE 250: Performed by: INTERNAL MEDICINE

## 2021-03-01 PROCEDURE — 85025 COMPLETE CBC W/AUTO DIFF WBC: CPT

## 2021-03-01 PROCEDURE — 94761 N-INVAS EAR/PLS OXIMETRY MLT: CPT

## 2021-03-01 PROCEDURE — 82728 ASSAY OF FERRITIN: CPT

## 2021-03-01 PROCEDURE — 99900035 HC TECH TIME PER 15 MIN (STAT)

## 2021-03-01 PROCEDURE — 27000221 HC OXYGEN, UP TO 24 HOURS

## 2021-03-01 RX ADMIN — REMDESIVIR 100 MG: 100 INJECTION, POWDER, LYOPHILIZED, FOR SOLUTION INTRAVENOUS at 09:03

## 2021-03-01 RX ADMIN — HYDROCHLOROTHIAZIDE 12.5 MG: 12.5 TABLET ORAL at 09:03

## 2021-03-01 RX ADMIN — APIXABAN 5 MG: 5 TABLET, FILM COATED ORAL at 09:03

## 2021-03-01 RX ADMIN — OLMESARTAN MEDOXOMIL 20 MG: 20 TABLET, FILM COATED ORAL at 09:03

## 2021-03-01 RX ADMIN — DEXAMETHASONE 6 MG: 4 TABLET ORAL at 09:03

## 2021-03-02 VITALS
OXYGEN SATURATION: 96 % | WEIGHT: 218.5 LBS | SYSTOLIC BLOOD PRESSURE: 123 MMHG | BODY MASS INDEX: 36.4 KG/M2 | HEART RATE: 61 BPM | TEMPERATURE: 97 F | DIASTOLIC BLOOD PRESSURE: 72 MMHG | RESPIRATION RATE: 18 BRPM | HEIGHT: 65 IN

## 2021-03-02 LAB
ALBUMIN SERPL BCP-MCNC: 3 G/DL (ref 3.5–5.2)
ALP SERPL-CCNC: 72 U/L (ref 55–135)
ALT SERPL W/O P-5'-P-CCNC: 36 U/L (ref 10–44)
ANION GAP SERPL CALC-SCNC: 14 MMOL/L (ref 8–16)
AST SERPL-CCNC: 25 U/L (ref 10–40)
BACTERIA BLD CULT: NORMAL
BACTERIA BLD CULT: NORMAL
BASOPHILS # BLD AUTO: 0.01 K/UL (ref 0–0.2)
BASOPHILS NFR BLD: 0.1 % (ref 0–1.9)
BILIRUB SERPL-MCNC: 1 MG/DL (ref 0.1–1)
BUN SERPL-MCNC: 28 MG/DL (ref 8–23)
CALCIUM SERPL-MCNC: 8.8 MG/DL (ref 8.7–10.5)
CHLORIDE SERPL-SCNC: 96 MMOL/L (ref 95–110)
CO2 SERPL-SCNC: 23 MMOL/L (ref 23–29)
CREAT SERPL-MCNC: 0.8 MG/DL (ref 0.5–1.4)
CRP SERPL-MCNC: 2.33 MG/DL
D DIMER PPP IA.FEU-MCNC: 0.99 UG/ML FEU
DIFFERENTIAL METHOD: ABNORMAL
EOSINOPHIL # BLD AUTO: 0 K/UL (ref 0–0.5)
EOSINOPHIL NFR BLD: 0.3 % (ref 0–8)
ERYTHROCYTE [DISTWIDTH] IN BLOOD BY AUTOMATED COUNT: 13.6 % (ref 11.5–14.5)
EST. GFR  (AFRICAN AMERICAN): >60 ML/MIN/1.73 M^2
EST. GFR  (NON AFRICAN AMERICAN): >60 ML/MIN/1.73 M^2
FERRITIN SERPL-MCNC: 1212 NG/ML (ref 20–300)
GLUCOSE SERPL-MCNC: 100 MG/DL (ref 70–110)
HCT VFR BLD AUTO: 42.6 % (ref 37–48.5)
HGB BLD-MCNC: 14.1 G/DL (ref 12–16)
IMM GRANULOCYTES # BLD AUTO: 0.06 K/UL (ref 0–0.04)
IMM GRANULOCYTES NFR BLD AUTO: 0.6 % (ref 0–0.5)
LYMPHOCYTES # BLD AUTO: 2.1 K/UL (ref 1–4.8)
LYMPHOCYTES NFR BLD: 23 % (ref 18–48)
MAGNESIUM SERPL-MCNC: 2.5 MG/DL (ref 1.6–2.6)
MCH RBC QN AUTO: 29.1 PG (ref 27–31)
MCHC RBC AUTO-ENTMCNC: 33.1 G/DL (ref 32–36)
MCV RBC AUTO: 88 FL (ref 82–98)
MONOCYTES # BLD AUTO: 0.6 K/UL (ref 0.3–1)
MONOCYTES NFR BLD: 6.7 % (ref 4–15)
NEUTROPHILS # BLD AUTO: 6.4 K/UL (ref 1.8–7.7)
NEUTROPHILS NFR BLD: 69.3 % (ref 38–73)
NRBC BLD-RTO: 0 /100 WBC
PHOSPHATE SERPL-MCNC: 3.8 MG/DL (ref 2.7–4.5)
PLATELET # BLD AUTO: 405 K/UL (ref 150–350)
PMV BLD AUTO: 10.3 FL (ref 9.2–12.9)
POTASSIUM SERPL-SCNC: 4.4 MMOL/L (ref 3.5–5.1)
PROT SERPL-MCNC: 7.3 G/DL (ref 6–8.4)
RBC # BLD AUTO: 4.85 M/UL (ref 4–5.4)
SODIUM SERPL-SCNC: 133 MMOL/L (ref 136–145)
WBC # BLD AUTO: 9.25 K/UL (ref 3.9–12.7)

## 2021-03-02 PROCEDURE — 63600175 PHARM REV CODE 636 W HCPCS: Performed by: INTERNAL MEDICINE

## 2021-03-02 PROCEDURE — 85379 FIBRIN DEGRADATION QUANT: CPT

## 2021-03-02 PROCEDURE — 84100 ASSAY OF PHOSPHORUS: CPT

## 2021-03-02 PROCEDURE — 80053 COMPREHEN METABOLIC PANEL: CPT

## 2021-03-02 PROCEDURE — 36415 COLL VENOUS BLD VENIPUNCTURE: CPT

## 2021-03-02 PROCEDURE — 85025 COMPLETE CBC W/AUTO DIFF WBC: CPT

## 2021-03-02 PROCEDURE — 25000242 PHARM REV CODE 250 ALT 637 W/ HCPCS: Performed by: INTERNAL MEDICINE

## 2021-03-02 PROCEDURE — 25000003 PHARM REV CODE 250: Performed by: INTERNAL MEDICINE

## 2021-03-02 PROCEDURE — 94761 N-INVAS EAR/PLS OXIMETRY MLT: CPT

## 2021-03-02 PROCEDURE — 83735 ASSAY OF MAGNESIUM: CPT

## 2021-03-02 PROCEDURE — 82728 ASSAY OF FERRITIN: CPT

## 2021-03-02 PROCEDURE — 86140 C-REACTIVE PROTEIN: CPT

## 2021-03-02 PROCEDURE — 99900035 HC TECH TIME PER 15 MIN (STAT)

## 2021-03-02 RX ORDER — TRAMADOL HYDROCHLORIDE 50 MG/1
50 TABLET ORAL EVERY 6 HOURS PRN
Qty: 20 EACH | Refills: 0
Start: 2021-03-02 | End: 2021-03-07

## 2021-03-02 RX ORDER — DEXAMETHASONE 6 MG/1
6 TABLET ORAL DAILY
Qty: 7 TABLET | Refills: 0 | Status: SHIPPED | OUTPATIENT
Start: 2021-03-03 | End: 2021-03-10

## 2021-03-02 RX ADMIN — APIXABAN 5 MG: 5 TABLET, FILM COATED ORAL at 09:03

## 2021-03-02 RX ADMIN — REMDESIVIR 100 MG: 100 INJECTION, POWDER, LYOPHILIZED, FOR SOLUTION INTRAVENOUS at 09:03

## 2021-03-02 RX ADMIN — DEXAMETHASONE 6 MG: 4 TABLET ORAL at 09:03

## 2021-03-02 RX ADMIN — OLMESARTAN MEDOXOMIL 20 MG: 20 TABLET, FILM COATED ORAL at 09:03

## 2021-03-02 RX ADMIN — HYDROCHLOROTHIAZIDE 12.5 MG: 12.5 TABLET ORAL at 09:03

## 2021-03-18 ENCOUNTER — IMMUNIZATION (OUTPATIENT)
Dept: FAMILY MEDICINE | Facility: CLINIC | Age: 71
End: 2021-03-18
Payer: MEDICARE

## 2021-03-18 DIAGNOSIS — Z23 NEED FOR VACCINATION: Primary | ICD-10-CM

## 2021-03-18 PROCEDURE — 91300 COVID-19, MRNA, LNP-S, PF, 30 MCG/0.3 ML DOSE VACCINE: CPT | Mod: ,,, | Performed by: FAMILY MEDICINE

## 2021-03-18 PROCEDURE — 0002A COVID-19, MRNA, LNP-S, PF, 30 MCG/0.3 ML DOSE VACCINE: ICD-10-PCS | Mod: CV19,,, | Performed by: FAMILY MEDICINE

## 2021-03-18 PROCEDURE — 91300 COVID-19, MRNA, LNP-S, PF, 30 MCG/0.3 ML DOSE VACCINE: ICD-10-PCS | Mod: ,,, | Performed by: FAMILY MEDICINE

## 2021-03-18 PROCEDURE — 0002A COVID-19, MRNA, LNP-S, PF, 30 MCG/0.3 ML DOSE VACCINE: CPT | Mod: CV19,,, | Performed by: FAMILY MEDICINE

## 2021-09-30 ENCOUNTER — IMMUNIZATION (OUTPATIENT)
Dept: PRIMARY CARE CLINIC | Facility: CLINIC | Age: 71
End: 2021-09-30
Payer: MEDICARE

## 2021-09-30 DIAGNOSIS — Z23 NEED FOR VACCINATION: Primary | ICD-10-CM

## 2021-09-30 PROCEDURE — 0003A COVID-19, MRNA, LNP-S, PF, 30 MCG/0.3 ML DOSE VACCINE: CPT | Mod: S$GLB,,, | Performed by: FAMILY MEDICINE

## 2021-09-30 PROCEDURE — 0003A COVID-19, MRNA, LNP-S, PF, 30 MCG/0.3 ML DOSE VACCINE: ICD-10-PCS | Mod: S$GLB,,, | Performed by: FAMILY MEDICINE

## 2021-09-30 PROCEDURE — 91300 COVID-19, MRNA, LNP-S, PF, 30 MCG/0.3 ML DOSE VACCINE: ICD-10-PCS | Mod: S$GLB,,, | Performed by: FAMILY MEDICINE

## 2021-09-30 PROCEDURE — 91300 COVID-19, MRNA, LNP-S, PF, 30 MCG/0.3 ML DOSE VACCINE: CPT | Mod: S$GLB,,, | Performed by: FAMILY MEDICINE

## 2022-04-25 ENCOUNTER — IMMUNIZATION (OUTPATIENT)
Dept: PRIMARY CARE CLINIC | Facility: CLINIC | Age: 72
End: 2022-04-25
Payer: MEDICARE

## 2022-04-25 DIAGNOSIS — Z23 NEED FOR VACCINATION: Primary | ICD-10-CM

## 2022-04-25 PROCEDURE — 0054A COVID-19, MRNA, LNP-S, PF, 30 MCG/0.3 ML DOSE VACCINE (PFIZER): CPT | Mod: S$GLB,,, | Performed by: FAMILY MEDICINE

## 2022-04-25 PROCEDURE — 91305 COVID-19, MRNA, LNP-S, PF, 30 MCG/0.3 ML DOSE VACCINE (PFIZER): CPT | Mod: S$GLB,,, | Performed by: FAMILY MEDICINE

## 2022-04-25 PROCEDURE — 91305 COVID-19, MRNA, LNP-S, PF, 30 MCG/0.3 ML DOSE VACCINE (PFIZER): ICD-10-PCS | Mod: S$GLB,,, | Performed by: FAMILY MEDICINE

## 2022-04-25 PROCEDURE — 0054A COVID-19, MRNA, LNP-S, PF, 30 MCG/0.3 ML DOSE VACCINE (PFIZER): ICD-10-PCS | Mod: S$GLB,,, | Performed by: FAMILY MEDICINE

## 2022-10-24 ENCOUNTER — IMMUNIZATION (OUTPATIENT)
Dept: PRIMARY CARE CLINIC | Facility: CLINIC | Age: 72
End: 2022-10-24
Payer: MEDICARE

## 2022-10-24 DIAGNOSIS — Z23 NEED FOR VACCINATION: Primary | ICD-10-CM

## 2022-10-24 PROCEDURE — 0124A COVID-19, MRNA, LNP-S, BIVALENT BOOSTER, PF, 30 MCG/0.3 ML DOSE: ICD-10-PCS | Mod: S$GLB,,, | Performed by: FAMILY MEDICINE

## 2022-10-24 PROCEDURE — 91312 COVID-19, MRNA, LNP-S, BIVALENT BOOSTER, PF, 30 MCG/0.3 ML DOSE: CPT | Mod: S$GLB,,, | Performed by: FAMILY MEDICINE

## 2022-10-24 PROCEDURE — 0124A COVID-19, MRNA, LNP-S, BIVALENT BOOSTER, PF, 30 MCG/0.3 ML DOSE: CPT | Mod: S$GLB,,, | Performed by: FAMILY MEDICINE

## 2022-10-24 PROCEDURE — 91312 COVID-19, MRNA, LNP-S, BIVALENT BOOSTER, PF, 30 MCG/0.3 ML DOSE: ICD-10-PCS | Mod: S$GLB,,, | Performed by: FAMILY MEDICINE

## 2022-10-25 ENCOUNTER — OFFICE VISIT (OUTPATIENT)
Dept: OBSTETRICS AND GYNECOLOGY | Facility: CLINIC | Age: 72
End: 2022-10-25
Payer: MEDICARE

## 2022-10-25 ENCOUNTER — HOSPITAL ENCOUNTER (OUTPATIENT)
Dept: RADIOLOGY | Facility: HOSPITAL | Age: 72
Discharge: HOME | End: 2022-10-25
Attending: OBSTETRICS & GYNECOLOGY
Payer: MEDICARE

## 2022-10-25 VITALS
WEIGHT: 248.25 LBS | SYSTOLIC BLOOD PRESSURE: 130 MMHG | DIASTOLIC BLOOD PRESSURE: 72 MMHG | BODY MASS INDEX: 41.36 KG/M2 | HEIGHT: 65 IN

## 2022-10-25 DIAGNOSIS — Z01.419 WELL WOMAN EXAM WITH ROUTINE GYNECOLOGICAL EXAM: Primary | ICD-10-CM

## 2022-10-25 DIAGNOSIS — Z12.72 SCREENING FOR VAGINAL CANCER: ICD-10-CM

## 2022-10-25 DIAGNOSIS — R10.2 PELVIC PAIN: ICD-10-CM

## 2022-10-25 PROCEDURE — 76830 TRANSVAGINAL US NON-OB: CPT | Mod: 26,,, | Performed by: RADIOLOGY

## 2022-10-25 PROCEDURE — G0101 CA SCREEN;PELVIC/BREAST EXAM: HCPCS | Mod: GZ,S$GLB,, | Performed by: OBSTETRICS & GYNECOLOGY

## 2022-10-25 PROCEDURE — 3288F PR FALLS RISK ASSESSMENT DOCUMENTED: ICD-10-PCS | Mod: CPTII,S$GLB,, | Performed by: OBSTETRICS & GYNECOLOGY

## 2022-10-25 PROCEDURE — 1159F PR MEDICATION LIST DOCUMENTED IN MEDICAL RECORD: ICD-10-PCS | Mod: CPTII,S$GLB,, | Performed by: OBSTETRICS & GYNECOLOGY

## 2022-10-25 PROCEDURE — 3075F SYST BP GE 130 - 139MM HG: CPT | Mod: CPTII,S$GLB,, | Performed by: OBSTETRICS & GYNECOLOGY

## 2022-10-25 PROCEDURE — 1101F PT FALLS ASSESS-DOCD LE1/YR: CPT | Mod: CPTII,S$GLB,, | Performed by: OBSTETRICS & GYNECOLOGY

## 2022-10-25 PROCEDURE — 99999 PR PBB SHADOW E&M-EST. PATIENT-LVL III: CPT | Mod: PBBFAC,,, | Performed by: OBSTETRICS & GYNECOLOGY

## 2022-10-25 PROCEDURE — 1126F PR PAIN SEVERITY QUANTIFIED, NO PAIN PRESENT: ICD-10-PCS | Mod: CPTII,S$GLB,, | Performed by: OBSTETRICS & GYNECOLOGY

## 2022-10-25 PROCEDURE — 99999 PR PBB SHADOW E&M-EST. PATIENT-LVL III: ICD-10-PCS | Mod: PBBFAC,,, | Performed by: OBSTETRICS & GYNECOLOGY

## 2022-10-25 PROCEDURE — 1126F AMNT PAIN NOTED NONE PRSNT: CPT | Mod: CPTII,S$GLB,, | Performed by: OBSTETRICS & GYNECOLOGY

## 2022-10-25 PROCEDURE — 87624 HPV HI-RISK TYP POOLED RSLT: CPT | Performed by: OBSTETRICS & GYNECOLOGY

## 2022-10-25 PROCEDURE — 76830 TRANSVAGINAL US NON-OB: CPT | Mod: TC,PO

## 2022-10-25 PROCEDURE — 3078F PR MOST RECENT DIASTOLIC BLOOD PRESSURE < 80 MM HG: ICD-10-PCS | Mod: CPTII,S$GLB,, | Performed by: OBSTETRICS & GYNECOLOGY

## 2022-10-25 PROCEDURE — G0101 PR CA SCREEN;PELVIC/BREAST EXAM: ICD-10-PCS | Mod: GZ,S$GLB,, | Performed by: OBSTETRICS & GYNECOLOGY

## 2022-10-25 PROCEDURE — 76856 US EXAM PELVIC COMPLETE: CPT | Mod: 26,,, | Performed by: RADIOLOGY

## 2022-10-25 PROCEDURE — 3288F FALL RISK ASSESSMENT DOCD: CPT | Mod: CPTII,S$GLB,, | Performed by: OBSTETRICS & GYNECOLOGY

## 2022-10-25 PROCEDURE — 3078F DIAST BP <80 MM HG: CPT | Mod: CPTII,S$GLB,, | Performed by: OBSTETRICS & GYNECOLOGY

## 2022-10-25 PROCEDURE — 88175 CYTOPATH C/V AUTO FLUID REDO: CPT | Performed by: OBSTETRICS & GYNECOLOGY

## 2022-10-25 PROCEDURE — 1159F MED LIST DOCD IN RCRD: CPT | Mod: CPTII,S$GLB,, | Performed by: OBSTETRICS & GYNECOLOGY

## 2022-10-25 PROCEDURE — 76830 US PELVIS COMP WITH TRANSVAG NON-OB (XPD): ICD-10-PCS | Mod: 26,,, | Performed by: RADIOLOGY

## 2022-10-25 PROCEDURE — 76856 US PELVIS COMP WITH TRANSVAG NON-OB (XPD): ICD-10-PCS | Mod: 26,,, | Performed by: RADIOLOGY

## 2022-10-25 PROCEDURE — 1101F PR PT FALLS ASSESS DOC 0-1 FALLS W/OUT INJ PAST YR: ICD-10-PCS | Mod: CPTII,S$GLB,, | Performed by: OBSTETRICS & GYNECOLOGY

## 2022-10-25 PROCEDURE — 3075F PR MOST RECENT SYSTOLIC BLOOD PRESS GE 130-139MM HG: ICD-10-PCS | Mod: CPTII,S$GLB,, | Performed by: OBSTETRICS & GYNECOLOGY

## 2022-10-25 RX ORDER — ATORVASTATIN CALCIUM 20 MG/1
20 TABLET, FILM COATED ORAL DAILY
COMMUNITY

## 2022-10-25 NOTE — PROGRESS NOTES
"Chief Complaint   Patient presents with    Annual Exam       History and Physical:  No LMP recorded. Patient is postmenopausal.    HRT: None     Essence Herrera is a 72 y.o.  who presents today for her routine annual GYN exam.   From a gyn standpoint she reports a 3 week history of right lower quadrant pain.  The pain tended to come and go but has not occurred recently.  She feels it is "inside".    She does have a history of a total vaginal hysterectomy greater than 25 years ago with conservation of the ovaries.  No history of abnormal Pap smears  She has had a mammogram within the last year and is scheduled for colonoscopy next month.      Allergies: Review of patient's allergies indicates:  No Known Allergies    Past Medical History:   Diagnosis Date    Hyperlipidemia     Hypertension        Past Surgical History:   Procedure Laterality Date    DILATION AND CURETTAGE OF UTERUS      HYSTERECTOMY      TVH with conservation of the ovaries    TUBAL LIGATION         MEDS:   Current Outpatient Medications:     atorvastatin (LIPITOR) 20 MG tablet, Take 20 mg by mouth once daily., Disp: , Rfl:     olmesartan-hydrochlorothiazide (BENICAR HCT) 20-12.5 mg per tablet, Take 1 tablet by mouth once daily., Disp: , Rfl:      OB History          4    Para   3    Term   3            AB   1    Living   3         SAB   1    IAB        Ectopic        Multiple        Live Births   3           Obstetric Comments   Vaginal delivery x3   SAB x1               Social History     Socioeconomic History    Marital status:    Tobacco Use    Smoking status: Never    Smokeless tobacco: Never   Substance and Sexual Activity    Alcohol use: Not Currently    Drug use: Never    Sexual activity: Not Currently       Family History   Problem Relation Age of Onset    Breast cancer Maternal Grandfather     Breast cancer Maternal Aunt          Past medical and surgical history reviewed.   I have reviewed the patient's medical " "history in detail and updated the computerized patient record.      Review of Systems (at today's evaluation)  Review of Systems   Constitutional:  Negative for chills, fever and unexpected weight change.   HENT:  Negative for congestion, ear pain, sinus pain and sore throat.    Eyes: Negative.    Respiratory:  Negative for cough and shortness of breath.    Cardiovascular:  Negative for chest pain.   Gastrointestinal:  Negative for abdominal pain, constipation, diarrhea, nausea and vomiting.   Endocrine: Negative.    Genitourinary:  Negative for dysuria, frequency, hematuria and urgency.        Gyn as per HPI   Musculoskeletal:  Negative for arthralgias.   Skin:  Negative for rash.   Neurological:  Negative for syncope, weakness and headaches.   Psychiatric/Behavioral:  The patient is not nervous/anxious.       Physical Exam:   /72 (BP Location: Right arm, Patient Position: Sitting, BP Method: Large (Manual))   Ht 5' 5" (1.651 m)   Wt 112.6 kg (248 lb 3.8 oz)   BMI 41.31 kg/m²       Physical Exam:   Constitutional: She appears well-developed and well-nourished.    HENT:   Head: Normocephalic.     Neck: No thyroid mass present.    Cardiovascular:  Normal rate, regular rhythm and normal heart sounds.             Pulmonary/Chest: Effort normal and breath sounds normal. Right breast exhibits no mass, no nipple discharge, no skin change and no tenderness. Left breast exhibits no mass, no nipple discharge, no skin change and no tenderness.   No right or left axillary adenopathy.        Abdominal: Soft. There is no abdominal tenderness.     Genitourinary:    Inguinal canal, vagina, right adnexa and left adnexa normal.      Pelvic exam was performed with patient supine.   The external female genitalia was normal.   No external genitalia lesions identified,Right adnexum displays no tenderness and no fullness. Left adnexum displays no tenderness and no fullness. No tenderness or bleeding in the vagina. Cervix is " absent.Uterus is absent. Normal urethral meatus.Urethra findings: no tendernessBladder findings: no bladder tenderness          Musculoskeletal:      Right lower leg: No edema.      Left lower leg: No edema.      Lymphadenopathy:     She has no cervical adenopathy. No inguinal adenopathy noted on the right or left side.    Neurological: She is alert.    Skin: Skin is warm and dry.    Psychiatric: She has a normal mood and affect. Mood normal.        Assessment:        1. Well woman exam with routine gynecological exam    2. Screening for vaginal cancer    3. Pelvic pain          Plan:     Well woman exam with routine gynecological exam    Screening for vaginal cancer  -     HPV High Risk Genotypes, PCR  -     Liquid-Based Pap Smear, Screening    Pelvic pain  -     US Pelvis Comp with Transvag NON-OB (xpd; Future; Expected date: 10/25/2022     Follow up for ASAP after recommended testing obtained, Follow-up on today's evaluation.     The above was reviewed and discussed with the patient.    Annual exam and screening issues based on the patient's age, medical history and family history were reviewed / discussed.    The patient's recent issues with pelvic pain were discussed.  In light of her previous hysterectomy with conservation of the ovaries a pelvic ultrasound has been ordered to evaluate the adnexa.    The patient's questions were answered, and she is in agreement with the current plan.

## 2022-11-01 LAB
FINAL PATHOLOGIC DIAGNOSIS: NORMAL
Lab: NORMAL

## 2022-11-02 LAB
HPV HR 12 DNA SPEC QL NAA+PROBE: NEGATIVE
HPV16 AG SPEC QL: NEGATIVE
HPV18 DNA SPEC QL NAA+PROBE: NEGATIVE

## 2022-11-07 ENCOUNTER — OFFICE VISIT (OUTPATIENT)
Dept: OBSTETRICS AND GYNECOLOGY | Facility: CLINIC | Age: 72
End: 2022-11-07
Payer: MEDICARE

## 2022-11-07 VITALS
DIASTOLIC BLOOD PRESSURE: 76 MMHG | BODY MASS INDEX: 41.15 KG/M2 | SYSTOLIC BLOOD PRESSURE: 112 MMHG | WEIGHT: 247 LBS | HEIGHT: 65 IN

## 2022-11-07 DIAGNOSIS — R10.2 PELVIC PAIN: Primary | ICD-10-CM

## 2022-11-07 PROCEDURE — 3008F BODY MASS INDEX DOCD: CPT | Mod: CPTII,S$GLB,, | Performed by: OBSTETRICS & GYNECOLOGY

## 2022-11-07 PROCEDURE — 3288F PR FALLS RISK ASSESSMENT DOCUMENTED: ICD-10-PCS | Mod: CPTII,S$GLB,, | Performed by: OBSTETRICS & GYNECOLOGY

## 2022-11-07 PROCEDURE — 3078F PR MOST RECENT DIASTOLIC BLOOD PRESSURE < 80 MM HG: ICD-10-PCS | Mod: CPTII,S$GLB,, | Performed by: OBSTETRICS & GYNECOLOGY

## 2022-11-07 PROCEDURE — 99999 PR PBB SHADOW E&M-EST. PATIENT-LVL II: ICD-10-PCS | Mod: PBBFAC,,, | Performed by: OBSTETRICS & GYNECOLOGY

## 2022-11-07 PROCEDURE — 3008F PR BODY MASS INDEX (BMI) DOCUMENTED: ICD-10-PCS | Mod: CPTII,S$GLB,, | Performed by: OBSTETRICS & GYNECOLOGY

## 2022-11-07 PROCEDURE — 99213 PR OFFICE/OUTPT VISIT, EST, LEVL III, 20-29 MIN: ICD-10-PCS | Mod: S$GLB,,, | Performed by: OBSTETRICS & GYNECOLOGY

## 2022-11-07 PROCEDURE — 1159F MED LIST DOCD IN RCRD: CPT | Mod: CPTII,S$GLB,, | Performed by: OBSTETRICS & GYNECOLOGY

## 2022-11-07 PROCEDURE — 1159F PR MEDICATION LIST DOCUMENTED IN MEDICAL RECORD: ICD-10-PCS | Mod: CPTII,S$GLB,, | Performed by: OBSTETRICS & GYNECOLOGY

## 2022-11-07 PROCEDURE — 99999 PR PBB SHADOW E&M-EST. PATIENT-LVL II: CPT | Mod: PBBFAC,,, | Performed by: OBSTETRICS & GYNECOLOGY

## 2022-11-07 PROCEDURE — 99213 OFFICE O/P EST LOW 20 MIN: CPT | Mod: S$GLB,,, | Performed by: OBSTETRICS & GYNECOLOGY

## 2022-11-07 PROCEDURE — 1101F PR PT FALLS ASSESS DOC 0-1 FALLS W/OUT INJ PAST YR: ICD-10-PCS | Mod: CPTII,S$GLB,, | Performed by: OBSTETRICS & GYNECOLOGY

## 2022-11-07 PROCEDURE — 3288F FALL RISK ASSESSMENT DOCD: CPT | Mod: CPTII,S$GLB,, | Performed by: OBSTETRICS & GYNECOLOGY

## 2022-11-07 PROCEDURE — 1126F AMNT PAIN NOTED NONE PRSNT: CPT | Mod: CPTII,S$GLB,, | Performed by: OBSTETRICS & GYNECOLOGY

## 2022-11-07 PROCEDURE — 3078F DIAST BP <80 MM HG: CPT | Mod: CPTII,S$GLB,, | Performed by: OBSTETRICS & GYNECOLOGY

## 2022-11-07 PROCEDURE — 3074F SYST BP LT 130 MM HG: CPT | Mod: CPTII,S$GLB,, | Performed by: OBSTETRICS & GYNECOLOGY

## 2022-11-07 PROCEDURE — 3074F PR MOST RECENT SYSTOLIC BLOOD PRESSURE < 130 MM HG: ICD-10-PCS | Mod: CPTII,S$GLB,, | Performed by: OBSTETRICS & GYNECOLOGY

## 2022-11-07 PROCEDURE — 1101F PT FALLS ASSESS-DOCD LE1/YR: CPT | Mod: CPTII,S$GLB,, | Performed by: OBSTETRICS & GYNECOLOGY

## 2022-11-07 PROCEDURE — 1126F PR PAIN SEVERITY QUANTIFIED, NO PAIN PRESENT: ICD-10-PCS | Mod: CPTII,S$GLB,, | Performed by: OBSTETRICS & GYNECOLOGY

## 2022-11-07 NOTE — PROGRESS NOTES
"Chief Complaint   Patient presents with    Follow-up     U/S Results       History and Physical:  No LMP recorded. Patient has had a hysterectomy.    HRT: None     Essence Herrera is a 72 y.o.    The patient was recently seen for annual exam.  At that time she reported a 3 week history of right lower quadrant pain.  The pain tended to come and go but has not occurred recently.  She feels it is "inside".    She does have a history of a total vaginal hysterectomy greater than 25 years ago with conservation of the ovaries.    Pelvic ultrasound was ordered and the patient presents today for follow-up.  She reports no recent episode of pain and that the previous right lower quadrant pain "is gone".      Allergies: Review of patient's allergies indicates:  No Known Allergies    Past Medical History:   Diagnosis Date    Hyperlipidemia     Hypertension        Past Surgical History:   Procedure Laterality Date    DILATION AND CURETTAGE OF UTERUS      HYSTERECTOMY      TVH with conservation of the ovaries    TUBAL LIGATION         MEDS:   Current Outpatient Medications:     atorvastatin (LIPITOR) 20 MG tablet, Take 20 mg by mouth once daily., Disp: , Rfl:     olmesartan-hydrochlorothiazide (BENICAR HCT) 20-12.5 mg per tablet, Take 1 tablet by mouth once daily., Disp: , Rfl:      OB History          4    Para   3    Term   3            AB   1    Living   3         SAB   1    IAB        Ectopic        Multiple        Live Births   3           Obstetric Comments   Vaginal delivery x3   SAB x1               Social History     Socioeconomic History    Marital status:    Tobacco Use    Smoking status: Never    Smokeless tobacco: Never   Substance and Sexual Activity    Alcohol use: Not Currently    Drug use: Never    Sexual activity: Not Currently       Family History   Problem Relation Age of Onset    Breast cancer Maternal Grandfather     Breast cancer Maternal Aunt          Past medical and surgical " "history reviewed.   I have reviewed the patient's medical history in detail and updated the computerized patient record.      Review of Systems (at today's evaluation)  Review of Systems   Constitutional:  Negative for fever and unexpected weight change.   Respiratory:  Negative for cough and shortness of breath.    Cardiovascular:  Negative for chest pain.   Gastrointestinal:  Negative for constipation, diarrhea and nausea.   Genitourinary:  Negative for dysuria and frequency.          GYN AS PER HPI   Musculoskeletal: Negative.    Skin: Negative.    Neurological: Negative.       Physical Exam:   /76 (BP Location: Left arm, Patient Position: Sitting, BP Method: Large (Manual))   Ht 5' 5" (1.651 m)   Wt 112.1 kg (247 lb 0.4 oz)   BMI 41.11 kg/m²       Physical Exam:   Constitutional: She appears well-developed and well-nourished.    HENT:   Head: Normocephalic.     Neck: No thyroid mass and no thyromegaly present.    Cardiovascular:  Normal rate.             Pulmonary/Chest: Effort normal. No respiratory distress.        Abdominal: Soft. There is no abdominal tenderness.             Musculoskeletal: Normal range of motion.      Right lower leg: No edema.      Left lower leg: No edema.       Neurological: She is alert.    Skin: Skin is warm and dry.    Psychiatric: She has a normal mood and affect. Mood normal.      Pap smear from 10/25/2022 noted no vaginal abnormalities and was HPV negative         10/25/2022 Routine     Narrative & Impression  EXAMINATION:  US PELVIS COMP WITH TRANSVAG NON-OB (XPD)     CLINICAL HISTORY:  Pelvic and perineal pain     TECHNIQUE:  Transabdominal sonography of the pelvis was performed, followed by transvaginal sonography to better evaluate the uterus and ovaries.     COMPARISON:  None.     FINDINGS:  Uterus:     Surgically absent.  No abnormalities of the vaginal cuff or urinary bladder are noted.     Right ovary:     Unable to visualize.  No adnexal mass seen.     Left " ovary:     Unable to visualize.  No adnexal mass seen.     Free Fluid:     None.     Impression:     Status post hysterectomy.  Unable to visualize either ovary.  No abnormalities noted.        Electronically signed by: Kristi Rodriguez  Date:                                            10/25/2022  Time:                                           15:58    Assessment:        1. Pelvic pain        Plan:     Pelvic pain       Follow up for as needed or for annual exam.     The above was reviewed and discussed with the patient.    Reassuring findings on both Pap smear and pelvic ultrasound were discussed.    The patient's questions regarding the above were answered and she is in agreement with the current plan.  She will notify us of any issues from a gyn standpoint including recurrence of her right lower quadrant pain.

## 2024-12-05 ENCOUNTER — TELEPHONE (OUTPATIENT)
Facility: CLINIC | Age: 74
End: 2024-12-05
Payer: MEDICARE

## 2024-12-05 DIAGNOSIS — C50.919 INVASIVE CARCINOMA OF BREAST: Primary | ICD-10-CM

## 2024-12-05 NOTE — NURSING
Oncology Navigation   Intake  Cancer Type: Breast  Type of Referral: External  Date of Referral: 12/04/24  Initial Nurse Navigator Contact: 12/05/24  Referral to Initial Contact Timeline (days): 1  Appointment Date: 12/09/24  Multiple appointments: Yes     Treatment                    ER: Negative  PA: Negative  Her2: Negative           Acuity      Follow Up  No follow-ups on file.     Received referral from Dr. Chung's office for pt to be seen by one of our providers for breast cancer. I spoke with pt and offered and appt with our Comprehensive breast clinic to see a medical oncologist/ radiation oncologist/ and a surgeon.  Pt would like an appt. Appt date time and location given to pt. Pt verbalized understanding.

## 2024-12-09 ENCOUNTER — OFFICE VISIT (OUTPATIENT)
Facility: CLINIC | Age: 74
End: 2024-12-09
Payer: MEDICARE

## 2024-12-09 VITALS
RESPIRATION RATE: 16 BRPM | WEIGHT: 230 LBS | HEART RATE: 66 BPM | HEART RATE: 66 BPM | BODY MASS INDEX: 38.27 KG/M2 | DIASTOLIC BLOOD PRESSURE: 63 MMHG | TEMPERATURE: 97 F | SYSTOLIC BLOOD PRESSURE: 123 MMHG | BODY MASS INDEX: 38.27 KG/M2 | DIASTOLIC BLOOD PRESSURE: 63 MMHG | SYSTOLIC BLOOD PRESSURE: 123 MMHG | WEIGHT: 230 LBS | RESPIRATION RATE: 16 BRPM | TEMPERATURE: 97 F

## 2024-12-09 VITALS
WEIGHT: 230.69 LBS | RESPIRATION RATE: 16 BRPM | TEMPERATURE: 97 F | SYSTOLIC BLOOD PRESSURE: 123 MMHG | HEART RATE: 66 BPM | BODY MASS INDEX: 38.39 KG/M2 | DIASTOLIC BLOOD PRESSURE: 63 MMHG

## 2024-12-09 DIAGNOSIS — C50.919 INVASIVE CARCINOMA OF BREAST: Primary | ICD-10-CM

## 2024-12-09 DIAGNOSIS — E66.01 SEVERE OBESITY (BMI 35.0-39.9) WITH COMORBIDITY: ICD-10-CM

## 2024-12-09 DIAGNOSIS — Z17.1 MALIGNANT NEOPLASM OF UPPER-INNER QUADRANT OF RIGHT BREAST IN FEMALE, ESTROGEN RECEPTOR NEGATIVE: Primary | ICD-10-CM

## 2024-12-09 DIAGNOSIS — C50.919 INVASIVE CARCINOMA OF BREAST: ICD-10-CM

## 2024-12-09 DIAGNOSIS — C50.211 MALIGNANT NEOPLASM OF UPPER-INNER QUADRANT OF RIGHT BREAST IN FEMALE, ESTROGEN RECEPTOR NEGATIVE: Primary | ICD-10-CM

## 2024-12-09 PROBLEM — C50.412 MALIGNANT NEOPLASM OF UPPER-OUTER QUADRANT OF LEFT BREAST IN FEMALE, ESTROGEN RECEPTOR NEGATIVE: Status: ACTIVE | Noted: 2024-12-09

## 2024-12-09 PROCEDURE — 99999 PR PBB SHADOW E&M-EST. PATIENT-LVL III: CPT | Mod: PBBFAC,,,

## 2024-12-09 PROCEDURE — 99999 PR PBB SHADOW E&M-EST. PATIENT-LVL IV: CPT | Mod: PBBFAC,,, | Performed by: INTERNAL MEDICINE

## 2024-12-09 PROCEDURE — G2211 COMPLEX E/M VISIT ADD ON: HCPCS | Mod: S$GLB,,, | Performed by: RADIOLOGY

## 2024-12-09 PROCEDURE — 99999 PR PBB SHADOW E&M-EST. PATIENT-LVL IV: CPT | Mod: PBBFAC,,, | Performed by: SURGERY

## 2024-12-09 PROCEDURE — 99205 OFFICE O/P NEW HI 60 MIN: CPT | Mod: S$GLB,,, | Performed by: RADIOLOGY

## 2024-12-09 PROCEDURE — 3078F DIAST BP <80 MM HG: CPT | Mod: CPTII,S$GLB,, | Performed by: RADIOLOGY

## 2024-12-09 PROCEDURE — 3074F SYST BP LT 130 MM HG: CPT | Mod: CPTII,S$GLB,, | Performed by: RADIOLOGY

## 2024-12-09 PROCEDURE — 1126F AMNT PAIN NOTED NONE PRSNT: CPT | Mod: CPTII,S$GLB,, | Performed by: RADIOLOGY

## 2024-12-09 PROCEDURE — 3008F BODY MASS INDEX DOCD: CPT | Mod: CPTII,S$GLB,, | Performed by: RADIOLOGY

## 2024-12-09 RX ORDER — CEFAZOLIN SODIUM 2 G/50ML
2 SOLUTION INTRAVENOUS
OUTPATIENT
Start: 2024-12-09

## 2024-12-09 RX ORDER — TIRZEPATIDE 2.5 MG/.5ML
2.5 INJECTION, SOLUTION SUBCUTANEOUS WEEKLY
COMMUNITY
Start: 2024-10-03

## 2024-12-09 NOTE — Clinical Note
Chemo school.  New chemo start.  Everything has been ordered except I think the chemo school just needs to be scheduled.  She is going to go on [place] vacation, so we can start the week after at the earliest.

## 2024-12-09 NOTE — PROGRESS NOTES
Essence Herrera  [id number]  1950 12/9/2024    [facility]    Dx: Stage IB  [[id number] - G2 - ER/ME/HER2(-)]  IDC of the R-UIQ breast    HISTORY OF PRESENT ILLNESS:   Essence Herrera is a post-menopausal 74 y.o. F who was noted on recent annual screening mammogram to have an abnormality in her right breast that was subsequently worked up via ultrasound and core needle biopsy, and determined to be TNBC.      She was then referred to [facility] multidisciplinary comprehensive breast clinic for eval and careplanning today.  She endorses an uncomplicated w/u thus far, and denies any history of breast erythema, tenderness, swelling, or nipple/skin changes or bleeding/drainage.      Dx MMG w/ U/S (10/28/24):        CNBx (11/7/24):          REVIEW OF SYSTEMS:  A complete ROS was performed and the patient denies any acute changes/concerns other than per HPI.    Past Medical History:   Diagnosis Date    Hyperlipidemia     Hypertension      Past Surgical History:   Procedure Laterality Date    DILATION AND CURETTAGE OF UTERUS      HYSTERECTOMY      TVH with conservation of the ovaries    TUBAL LIGATION       Social History     Socioeconomic History    Marital status:    Tobacco Use    Smoking status: Never    Smokeless tobacco: Never   Substance and Sexual Activity    Alcohol use: Not Currently    Drug use: Never    Sexual activity: Not Currently     Family History   Problem Relation Name Age of Onset    Breast cancer Maternal Grandfather      Breast cancer Maternal Aunt         PRIOR HISTORY OF CHEMOTHERAPY OR RADIOTHERAPY: Please see HPI for patients prior oncologic history.    Medication List with Changes/Refills   Current Medications    ATORVASTATIN (LIPITOR) 20 MG TABLET    Take 20 mg by mouth once daily.    MOUNJARO 2.5 MG/0.5 ML PNIJ    Inject 2.5 mg into the skin once a week.    OLMESARTAN-HYDROCHLOROTHIAZIDE (BENICAR HCT) 20-12.5 MG PER TABLET    Take 1 tablet by mouth once  daily.     Review of patient's allergies indicates:  No Known Allergies    QUALITY OF LIFE: 90%- Able to Carry on Normal Activity: Minor Symptoms of Disease    Vitals:    12/09/24 1408   BP: 123/63   Pulse: 66   Resp: 16   Temp: 97.3 °F (36.3 °C)   Weight: 104.3 kg (230 lb)   PainSc: 0-No pain     Body mass index is 38.27 kg/m².    PHYSICAL EXAM:  GENERAL: alert; in no apparent distress.   HEAD: normocephalic, atraumatic.  EYES: pupils are equal, round, reactive to light and accommodation. Sclera anicteric. Conjunctiva not injected.   NOSE/THROAT: no nasal erythema or rhinorrhea. Oropharynx pink, without erythema, ulcerations or thrush.   NECK: no cervical motion rigidity; supple with no masses.  CHEST: clear to auscultation bilaterally; no wheezes, crackles or rubs. Patient is speaking comfortably on room air with normal work of breathing without using accessory muscles of respiration.  CARDIOVASCULAR: regular rate and rhythm; no murmurs, rubs or gallops.  ABDOMEN: soft, nontender, nondistended. Bowel sounds present.   MUSCULOSKELETAL: no tenderness to palpation along the spine or scapulae. Normal range of motion.  NEUROLOGIC: cranial nerves II-XII intact bilaterally. Strength 5/5 in bilateral upper and lower extremities. No sensory deficits appreciated. Reflexes globally intact. No cerebellar signs. Normal gait.  LYMPHATIC: no cervical, supraclavicular or axillary adenopathy appreciated bilaterally.   EXTREMITIES: no clubbing, cyanosis, edema.  BREAST:  The bilateral breasts were examined in the upright and supine position. The breasts were grossly symmetrical in terms of size and configuration. The right breast bx site appeared well healed w/o inflammation or significant seroma. Palpation revealed soft, pliable tissue of both breasts without any discrete lesions or masses. The right and left axillae did not exhibit any evidence of lymphadenopathy. No nipple retraction/inversion or discharge  appreciated.      REVIEW OF IMAGING/PATHOLOGY/LABS: Please see HPI. All images reviewed personally by dictating physician.       ASSESSMENT: Essence Herrera is a 74 y.o. female with stage IB  [[id number] - G2 - ER/MS/HER2(-)]  IDC of the R-UIQ breast    PLAN:  After review of the patient's hx/records, I spent over one hour in consultation with the patient and her daughter discussing the rationales, risks, benefits, and alternatives to WBRT/APBI/PMRT in several possible settings, as well as the potential toxicities, including but not limited to fatigue, skin irritation/erythema/desquamation, late breast/skin scarring and increased density w/ possible telangiectasias and breast contracture, pain, lymphedema, increased lifetime risk of CAD and cardiac events, pneumonitis and pulmonary fibrosis causing cough, SOB/DURAN, and/or chronic decline in pulmonary function, increased risk of rib fxr, and secondary malignancy.  I carefully explained the process of simulation and treatment delivery with weekly physician visits.      We then discussed in detail several potential options and outcomes for her care upon completed w/u, including rationales and considerations for the use or not of RT to optimize outcomes in the settings of BCS and mastectomy surgical approaches; wherein adj RT would be standardly recommended as part of BCT, but adj RT indications s/p mastectomy would depend on her surgical pathology findings, including but not limited to absolute indications of high-risk, such as pT3 or N(+) disease and/or (+)SM, as well as relative/cumulative indications, such as premenopausal status, close SM (<1-2mm), inner/medial quadrant tumor location, (+)LVSI, and residual malignancy after neoadj chemotherapy.     She also met with Dr. Sanchez today to discuss her surgical careplan, as well as w/ Dr. Khoobehi re: her potential timing and indications, or lack thereof, for oncotype, systemic chemotherapy, and endocrine therapy  options, all as part of her multidisciplinary clinic careplan.    PET, MRI, BRCA, and echo ordered to be done prior to neoadj chemo. Will RTC for surgery and adj tx planning as indicated after chemo.     The patient verbalized understanding of the above discussion, and her questions were answered fully and appropriately to her liking.  We will plan for her to RTC as indicated or requested for further discussion and planning of her care in coordination with her surgical and MedOnc teams. Contact information was exchanged, and the patient was advised to contact the clinic with any questions or concerns.      DISPOSITION: RTC AFTER FURTHER WORKUP    I have personally seen and evaluated this patient. Greater than 50% of this time was spent discussing coordination of care and/or counseling.    PHYSICIAN: William Thomas III, MD    Thank you for the opportunity to meet and consult with Essence Herrera.   Please feel free to contact me to discuss the above recommendation further.

## 2024-12-09 NOTE — PROGRESS NOTES
Service Date:  12/9/24    Chief Complaint:  Breast cancer     Essence Herrera is a 74 y.o. female who presents to multidisciplinary clinic after a recent annual screening mammogram showed an abnormality in her right breast subsequently worked up to show triple negative breast cancer cT1c N0 M0, grade 2.  She is here to discuss treatment options.  She has recovered from the biopsy with no issues.  She has a family history of breast cancer.  Her daughter is present at the visit with her.  She denies any cardiac issues.    Review of Systems   Constitutional: Negative.    HENT: Negative.     Eyes: Negative.    Respiratory: Negative.     Cardiovascular: Negative.    Gastrointestinal: Negative.    Endocrine: Negative.    Genitourinary: Negative.    Musculoskeletal: Negative.    Integumentary:  Negative.   Neurological: Negative.    Hematological: Negative.    Psychiatric/Behavioral: Negative.          Current Outpatient Medications   Medication Instructions    atorvastatin (LIPITOR) 20 mg, Oral, Daily    MOUNJARO 2.5 mg, Weekly    olmesartan-hydrochlorothiazide (BENICAR HCT) 20-12.5 mg per tablet 1 tablet, Oral, Daily        Past Medical History:   Diagnosis Date    Hyperlipidemia     Hypertension         Past Surgical History:   Procedure Laterality Date    DILATION AND CURETTAGE OF UTERUS      HYSTERECTOMY      TVH with conservation of the ovaries    TUBAL LIGATION          Family History   Problem Relation Name Age of Onset    Breast cancer Maternal Grandfather      Breast cancer Maternal Aunt         Social History     Tobacco Use    Smoking status: Never    Smokeless tobacco: Never   Substance Use Topics    Alcohol use: Not Currently    Drug use: Never         Vitals:    12/09/24 1343   BP: 123/63   Pulse: 66   Resp: 16   Temp: 97.3 °F (36.3 °C)        Physical Exam:  /63   Pulse 66   Temp 97.3 °F (36.3 °C)   Resp 16   Wt 104.6 kg (230 lb 11.2 oz)   BMI 38.39 kg/m²     Physical Exam  Constitutional:        Appearance: Normal appearance.   HENT:      Head: Normocephalic and atraumatic.      Nose: Nose normal.      Mouth/Throat:      Mouth: Mucous membranes are moist.      Pharynx: Oropharynx is clear.   Eyes:      Conjunctiva/sclera: Conjunctivae normal.   Cardiovascular:      Rate and Rhythm: Normal rate and regular rhythm.      Heart sounds: Normal heart sounds.   Pulmonary:      Effort: Pulmonary effort is normal.      Breath sounds: Normal breath sounds.   Abdominal:      General: Abdomen is flat. Bowel sounds are normal.      Palpations: Abdomen is soft.   Musculoskeletal:         General: Normal range of motion.      Cervical back: Normal range of motion and neck supple.   Skin:     General: Skin is warm and dry.   Neurological:      General: No focal deficit present.      Mental Status: She is alert and oriented to person, place, and time. Mental status is at baseline.   Psychiatric:         Mood and Affect: Mood normal.          Labs:  Lab Results   Component Value Date    WBC 9.25 03/02/2021    RBC 4.85 03/02/2021    HGB 14.1 03/02/2021    HCT 42.6 03/02/2021    MCV 88 03/02/2021    MCH 29.1 03/02/2021    MCHC 33.1 03/02/2021    RDW 13.6 03/02/2021     (H) 03/02/2021    MPV 10.3 03/02/2021    GRAN 6.4 03/02/2021    GRAN 69.3 03/02/2021    LYMPH 2.1 03/02/2021    LYMPH 23.0 03/02/2021    MONO 0.6 03/02/2021    MONO 6.7 03/02/2021    EOS 0.0 03/02/2021    BASO 0.01 03/02/2021    EOSINOPHIL 0.3 03/02/2021    BASOPHIL 0.1 03/02/2021     Sodium   Date Value Ref Range Status   03/02/2021 133 (L) 136 - 145 mmol/L Final     Potassium   Date Value Ref Range Status   03/02/2021 4.4 3.5 - 5.1 mmol/L Final     Chloride   Date Value Ref Range Status   03/02/2021 96 95 - 110 mmol/L Final     CO2   Date Value Ref Range Status   03/02/2021 23 23 - 29 mmol/L Final     Glucose   Date Value Ref Range Status   03/02/2021 100 70 - 110 mg/dL Final     BUN   Date Value Ref Range Status   03/02/2021 28 (H) 8 - 23 mg/dL Final      Creatinine   Date Value Ref Range Status   03/02/2021 0.8 0.5 - 1.4 mg/dL Final     Calcium   Date Value Ref Range Status   03/02/2021 8.8 8.7 - 10.5 mg/dL Final     Total Protein   Date Value Ref Range Status   03/02/2021 7.3 6.0 - 8.4 g/dL Final     Albumin   Date Value Ref Range Status   03/02/2021 3.0 (L) 3.5 - 5.2 g/dL Final     Total Bilirubin   Date Value Ref Range Status   03/02/2021 1.0 0.1 - 1.0 mg/dL Final     Comment:     For infants and newborns, interpretation of results should be based  on gestational age, weight and in agreement with clinical  observations.    Premature Infant recommended reference ranges:  Up to 24 hours.............<8.0 mg/dL  Up to 48 hours............<12.0 mg/dL  3-5 days..................<15.0 mg/dL  6-29 days.................<15.0 mg/dL       Alkaline Phosphatase   Date Value Ref Range Status   03/02/2021 72 55 - 135 U/L Final     AST   Date Value Ref Range Status   03/02/2021 25 10 - 40 U/L Final     ALT   Date Value Ref Range Status   03/02/2021 36 10 - 44 U/L Final     Anion Gap   Date Value Ref Range Status   03/02/2021 14 8 - 16 mmol/L Final     eGFR if    Date Value Ref Range Status   03/02/2021 >60.0 >60 mL/min/1.73 m^2 Final     eGFR if non    Date Value Ref Range Status   03/02/2021 >60.0 >60 mL/min/1.73 m^2 Final     Comment:     Calculation used to obtain the estimated glomerular filtration  rate (eGFR) is the CKD-EPI equation.          A/P:    Malignant neoplasm of the right breast   -triple negative   -cT1c N0 M0   -discussed case with Dr. Nelli white and Dr. Thomas  -we have decided to approach this with neoadjuvant chemotherapy.  I will give ACT.  We will start by getting an echocardiogram, MRI of the breast, and PET scan  -she will need a port placed   -I will order chemotherapy school   -I explained the risks, benefits, and rationale for neoadjuvant chemotherapy.  She understood and is in agreement   -I will have her  return to clinic when she is ready to start chemo      Aurash Khoobehi, MD  Hematology and Oncology

## 2024-12-10 ENCOUNTER — HOSPITAL ENCOUNTER (OUTPATIENT)
Dept: CARDIOLOGY | Facility: HOSPITAL | Age: 74
Discharge: HOME | End: 2024-12-10
Attending: INTERNAL MEDICINE
Payer: MEDICARE

## 2024-12-10 ENCOUNTER — HOSPITAL ENCOUNTER (OUTPATIENT)
Dept: PREADMISSION TESTING | Facility: HOSPITAL | Age: 74
Discharge: HOME | End: 2024-12-10
Attending: SURGERY
Payer: MEDICARE

## 2024-12-10 ENCOUNTER — TELEPHONE (OUTPATIENT)
Facility: CLINIC | Age: 74
End: 2024-12-10
Payer: MEDICARE

## 2024-12-10 VITALS — BODY MASS INDEX: 38.32 KG/M2 | HEIGHT: 65 IN | WEIGHT: 230 LBS

## 2024-12-10 DIAGNOSIS — C50.919 INVASIVE CARCINOMA OF BREAST: ICD-10-CM

## 2024-12-10 LAB
OHS QRS DURATION: 80 MS
OHS QTC CALCULATION: 450 MS

## 2024-12-10 PROCEDURE — 93356 MYOCRD STRAIN IMG SPCKL TRCK: CPT

## 2024-12-10 PROCEDURE — 93356 MYOCRD STRAIN IMG SPCKL TRCK: CPT | Mod: ,,, | Performed by: GENERAL PRACTICE

## 2024-12-10 PROCEDURE — 93306 TTE W/DOPPLER COMPLETE: CPT | Mod: 26,,, | Performed by: GENERAL PRACTICE

## 2024-12-10 PROCEDURE — 93010 ELECTROCARDIOGRAM REPORT: CPT | Mod: ,,, | Performed by: GENERAL PRACTICE

## 2024-12-10 PROCEDURE — 93005 ELECTROCARDIOGRAM TRACING: CPT | Performed by: GENERAL PRACTICE

## 2024-12-10 NOTE — H&P (VIEW-ONLY)
GENERAL SURGERY  OUTPATIENT H&P    REASON FOR VISIT/CC: Triple negative right breast cancer    HPI: Essence Herrera is a 74 y.o. female with a history of hypertension and hyperlipidemia, benign left breast biopsies, who underwent screening mammography/ultrasound at which time a 13 x 9 x 5 mm mass in the right breast 4 cm from the nipple at approximately the 1 to 2 o'clock position was identified. 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 She is accompanied by her daughter. Patient reports previous benign breast biopsies on the left side. No previous biopsies on the right.  No previous personal history of cancer.  Does have numerous family members with a history of breast cancer including aunts, nieces and grandmother, mother had history of lymphoma. Denies skin changes, nipple inversion or discharge or palpable masses.    I have reviewed the patient's chart including prior progress notes, procedures and testing.     ROS:   Review of Systems    PROBLEM LIST:  Patient Active Problem List   Diagnosis    Pneumonia due to COVID-19 virus    Essential hypertension    Hyperlipidemia    Acute respiratory failure with hypoxia    Severe obesity (BMI 35.0-39.9) with comorbidity    Malignant neoplasm of upper-inner quadrant of right breast in female, estrogen receptor negative         HISTORY  Past Medical History:   Diagnosis Date    Hyperlipidemia     Hypertension        Past Surgical History:   Procedure Laterality Date    DILATION AND CURETTAGE OF UTERUS      HYSTERECTOMY      TVH with conservation of the ovaries    TUBAL LIGATION         Social History     Tobacco Use    Smoking status: Never    Smokeless tobacco: Never   Substance Use Topics    Alcohol use: Not Currently    Drug use: Never       Family History   Problem Relation Name Age of Onset    Breast cancer Maternal Grandfather      Breast cancer Maternal  Aunt           MEDS:  Current Outpatient Medications on File Prior to Visit   Medication Sig Dispense Refill    atorvastatin (LIPITOR) 20 MG tablet Take 20 mg by mouth once daily.      MOUNJARO 2.5 mg/0.5 mL PnIj Inject 2.5 mg into the skin once a week.      olmesartan-hydrochlorothiazide (BENICAR HCT) 20-12.5 mg per tablet Take 1 tablet by mouth once daily.       No current facility-administered medications on file prior to visit.       ALLERGIES:  Review of patient's allergies indicates:  No Known Allergies      VITALS:  Vitals:    12/09/24 1408   BP: 123/63   Pulse: 66   Resp: 16   Temp: 97.3 °F (36.3 °C)         PHYSICAL EXAM:  Physical Exam  Vitals reviewed.   Constitutional:       General: She is not in acute distress.     Appearance: Normal appearance. She is well-developed.   HENT:      Head: Normocephalic and atraumatic.      Nose: Nose normal.   Eyes:      General: No scleral icterus.     Conjunctiva/sclera: Conjunctivae normal.   Neck:      Trachea: No tracheal tenderness or tracheal deviation.   Cardiovascular:      Rate and Rhythm: Normal rate and regular rhythm.      Pulses: Normal pulses.   Pulmonary:      Effort: Pulmonary effort is normal. No accessory muscle usage or respiratory distress.      Breath sounds: Normal breath sounds.   Chest:   Breasts:     Right: No inverted nipple, mass, nipple discharge, skin change or tenderness.      Left: No inverted nipple, mass, nipple discharge, skin change or tenderness.      Comments: Well-healed previous biopsy sites on left breast  Abdominal:      General: There is no distension.      Palpations: Abdomen is soft.      Tenderness: There is no abdominal tenderness.   Musculoskeletal:         General: No swelling or tenderness. Normal range of motion.      Cervical back: Normal range of motion and neck supple. No rigidity.   Lymphadenopathy:      Upper Body:      Right upper body: No supraclavicular or axillary adenopathy.      Left upper body: No  supraclavicular or axillary adenopathy.   Skin:     General: Skin is warm and dry.      Coloration: Skin is not jaundiced.      Findings: No erythema.   Neurological:      General: No focal deficit present.      Mental Status: She is alert and oriented to person, place, and time.      Motor: No weakness or abnormal muscle tone.   Psychiatric:         Mood and Affect: Mood normal.         Behavior: Behavior normal.         Thought Content: Thought content normal.         Judgment: Judgment normal.           LABS:  Lab Results   Component Value Date    WBC 9.25 03/02/2021    RBC 4.85 03/02/2021    HGB 14.1 03/02/2021    HCT 42.6 03/02/2021     (H) 03/02/2021     Lab Results   Component Value Date     03/02/2021     (L) 03/02/2021    K 4.4 03/02/2021    CL 96 03/02/2021    CO2 23 03/02/2021    BUN 28 (H) 03/02/2021    CREATININE 0.8 03/02/2021    CALCIUM 8.8 03/02/2021     Lab Results   Component Value Date    ALT 36 03/02/2021    AST 25 03/02/2021    ALKPHOS 72 03/02/2021    BILITOT 1.0 03/02/2021     Lab Results   Component Value Date    MG 2.5 03/02/2021    PHOS 3.8 03/02/2021       STUDIES:  Images and reports were personally reviewed.        ASSESSMENT & PLAN:  74 y.o. female with right breast invasive ductal carcinoma, cT1C cN0, G2, triple negative  -case discussed with multidisciplinary team to include Dr. Khoobehi (Onc) and Dr. Thomas (RadOnc)  -given the patient's triple negative status and suspected T1c sized lesion she would be a candidate for neoadjuvant chemotherapy  -recommending MRI bilateral breast to further delineate size and assured no other lesions are present, PET scan to rule out metastatic disease and genetic testing due to family history of breast cancer  -I discussed with the patient placement of a Port-A-Cath for infusion of chemotherapy, risks including pain, bleeding, scarring, infection, malfunction, injury to artery, injury to lung, etc. reviewed, patient expressed  understanding of these risks, she is agreeable to proceed with neoadjuvant chemotherapy and port placement, will schedule for port placement on 12/16/2024, labs and EKG ordered  -after neoadjuvant therapy will reassess clinical status, at this time she is a candidate for breast conservation therapy with lumpectomy, sentinel lymph node biopsy to be followed by breast radiation or for mastectomy with sentinel lymph node biopsy, we did discuss some of the risks and benefits of each of these procedures, also discussed potential performing a contralateral prophylactic mastectomy on the left and briefly discuss reconstruction options  -patient at this time is considering mastectomy to avoid ongoing worry about redevelopment of breast cancer on the right side or development on the left side, final decision on operative intervention can be made at a later date  -we did discuss that if lymph nodes are identified on sentinel lymph node biopsy radiation may be required to the axilla regardless if a she underwent lumpectomy or mastectomy, patient expressed understanding of this

## 2024-12-10 NOTE — NURSING
Met with pt and friend while in [facility].  Reviewed plan of care (pet, breast MRI, [place] genetic testing,echo, port, and chemoschool) and answered questions.   Reviewed my contact information.  She was encouraged to call with any questions or concerns.

## 2024-12-10 NOTE — PLAN OF CARE
START ON PATHWAY REGIMEN - Breast    [id number]        Doxorubicin       Cyclophosphamide       Pegfilgrastim-xxxx       Paclitaxel       Pegfilgrastim-xxxx           Additional Orders: LVEF assessment prior to initiation of doxorubicin   and as clinically indicated. Max recommended cumulative doxorubicin dose = 450   mg/m2.    **Always confirm dose/schedule in your pharmacy ordering system**    Patient Characteristics:  Preoperative or Nonsurgical Candidate, M0 (Clinical Staging), Up to cT4c, Any N,   M0, Neoadjuvant Therapy followed by Surgery, Invasive Disease, Chemotherapy,   HER2 Negative, ER Negative, [place] Therapy Not Indicated  Therapeutic Status: Preoperative or Nonsurgical Candidate, M0 (Clinical Staging)  AJCC M Category: cM0  AJCC Grade: G2  ER Status: Negative (-)  AJCC 8 Stage Grouping: IB  HER2 Status: Negative (-)  AJCC T Category: cT1c  AJCC N Category: cN0  [place] Status: Negative (-)  Breast Surgical Plan: Neoadjuvant Therapy followed by Surgery  Intent of Therapy:  Curative Intent, Discussed with Patient

## 2024-12-11 ENCOUNTER — TELEPHONE (OUTPATIENT)
Dept: HEMATOLOGY/ONCOLOGY | Facility: CLINIC | Age: 74
End: 2024-12-11
Payer: MEDICARE

## 2024-12-12 ENCOUNTER — TELEPHONE (OUTPATIENT)
Dept: HEMATOLOGY/ONCOLOGY | Facility: CLINIC | Age: 74
End: 2024-12-12
Payer: MEDICARE

## 2024-12-12 ENCOUNTER — HOSPITAL ENCOUNTER (OUTPATIENT)
Dept: PREADMISSION TESTING | Facility: HOSPITAL | Age: 74
Discharge: HOME | End: 2024-12-12
Attending: SURGERY
Payer: MEDICARE

## 2024-12-12 VITALS
BODY MASS INDEX: 37.82 KG/M2 | RESPIRATION RATE: 16 BRPM | DIASTOLIC BLOOD PRESSURE: 73 MMHG | HEART RATE: 55 BPM | OXYGEN SATURATION: 99 % | SYSTOLIC BLOOD PRESSURE: 112 MMHG | HEIGHT: 65 IN | WEIGHT: 227 LBS

## 2024-12-12 DIAGNOSIS — Z01.818 PRE-OP TESTING: Primary | ICD-10-CM

## 2024-12-12 DIAGNOSIS — C50.919 INVASIVE CARCINOMA OF BREAST: ICD-10-CM

## 2024-12-12 LAB
ALBUMIN SERPL BCP-MCNC: 3.7 G/DL (ref 3.5–5.2)
ALP SERPL-CCNC: 88 U/L (ref 55–135)
ALT SERPL W/O P-5'-P-CCNC: 7 U/L (ref 10–44)
ANION GAP SERPL CALC-SCNC: 6 MMOL/L (ref 8–16)
AORTIC ROOT ANNULUS: 3 CM
AORTIC VALVE CUSP SEPERATION: 1.5 CM
APICAL FOUR CHAMBER EJECTION FRACTION: 71 %
AST SERPL-CCNC: 13 U/L (ref 10–40)
AV INDEX (PROSTH): 0.84
AV MEAN GRADIENT: 5 MMHG
AV PEAK GRADIENT: 9 MMHG
AV VALVE AREA BY VELOCITY RATIO: 2.7 CM²
AV VALVE AREA: 2.6 CM²
AV VELOCITY RATIO: 0.87
BILIRUB SERPL-MCNC: 0.7 MG/DL (ref 0.1–1)
BSA FOR ECHO PROCEDURE: 2.19 M2
BUN SERPL-MCNC: 14 MG/DL (ref 8–23)
CALCIUM SERPL-MCNC: 9.1 MG/DL (ref 8.7–10.5)
CHLORIDE SERPL-SCNC: 106 MMOL/L (ref 95–110)
CO2 SERPL-SCNC: 25 MMOL/L (ref 23–29)
CREAT SERPL-MCNC: 0.9 MG/DL (ref 0.5–1.4)
CV ECHO LV RWT: 0.52 CM
DOP CALC AO PEAK VEL: 1.5 M/S
DOP CALC AO VTI: 34.9 CM
DOP CALC LVOT AREA: 3.1 CM2
DOP CALC LVOT DIAMETER: 2 CM
DOP CALC LVOT PEAK VEL: 1.3 M/S
DOP CALC LVOT STROKE VOLUME: 91.7 CM3
DOP CALCLVOT PEAK VEL VTI: 29.2 CM
E WAVE DECELERATION TIME: 280 MSEC
E/A RATIO: 0.76
E/E' RATIO: 7.44 M/S
ECHO LV POSTERIOR WALL: 1.1 CM (ref 0.6–1.1)
EST. GFR  (NO RACE VARIABLE): >60 ML/MIN/1.73 M^2
FRACTIONAL SHORTENING: 35.7 % (ref 28–44)
GLOBAL LONGITUIDAL STRAIN: -21 %
GLUCOSE SERPL-MCNC: 99 MG/DL (ref 70–110)
INTERVENTRICULAR SEPTUM: 1.1 CM (ref 0.6–1.1)
IVRT: 132 MSEC
LEFT ATRIUM SIZE: 3.8 CM
LEFT INTERNAL DIMENSION IN SYSTOLE: 2.7 CM (ref 2.1–4)
LEFT VENTRICLE DIASTOLIC VOLUME INDEX: 38.29 ML/M2
LEFT VENTRICLE DIASTOLIC VOLUME: 80.4 ML
LEFT VENTRICLE END DIASTOLIC VOLUME APICAL 4 CHAMBER: 82.5 ML
LEFT VENTRICLE MASS INDEX: 74.8 G/M2
LEFT VENTRICLE SYSTOLIC VOLUME INDEX: 12.3 ML/M2
LEFT VENTRICLE SYSTOLIC VOLUME: 25.8 ML
LEFT VENTRICULAR INTERNAL DIMENSION IN DIASTOLE: 4.2 CM (ref 3.5–6)
LEFT VENTRICULAR MASS: 157.1 G
LV LATERAL E/E' RATIO: 6.7 M/S
LV SEPTAL E/E' RATIO: 8.38 M/S
LVED V (TEICH): 80.4 ML
LVES V (TEICH): 25.8 ML
LVOT MG: 3 MMHG
LVOT MV: 0.79 CM/S
MV PEAK A VEL: 0.88 M/S
MV PEAK E VEL: 0.67 M/S
MV STENOSIS PRESSURE HALF TIME: 61 MS
MV VALVE AREA P 1/2 METHOD: 3.61 CM2
PISA TR MAX VEL: 2.03 M/S
POTASSIUM SERPL-SCNC: 4.1 MMOL/L (ref 3.5–5.1)
PROT SERPL-MCNC: 7 G/DL (ref 6–8.4)
RA PRESSURE ESTIMATED: 3 MMHG
RIGHT VENTRICULAR END-DIASTOLIC DIMENSION: 2.38 CM
RV TB RVSP: 5 MMHG
SODIUM SERPL-SCNC: 137 MMOL/L (ref 136–145)
TDI LATERAL: 0.1 M/S
TDI SEPTAL: 0.08 M/S
TDI: 0.09 M/S
TR MAX PG: 16 MMHG
TV REST PULMONARY ARTERY PRESSURE: 19 MMHG
Z-SCORE OF LEFT VENTRICULAR DIMENSION IN END DIASTOLE: -4.41
Z-SCORE OF LEFT VENTRICULAR DIMENSION IN END SYSTOLE: -3.09

## 2024-12-12 PROCEDURE — 80053 COMPREHEN METABOLIC PANEL: CPT | Performed by: SURGERY

## 2024-12-12 PROCEDURE — 36415 COLL VENOUS BLD VENIPUNCTURE: CPT | Performed by: SURGERY

## 2024-12-12 NOTE — DISCHARGE INSTRUCTIONS
SURGERY INSTRUCTIONS    YOUR SURGERY IS SCHEDULED FOR MONDAY DECEMBER 16TH.  A NURSE WILL CALL YOU BETWEEN 4 AND 6 PM THIS EVENING AND LET YOUR KNOW WHAT TIME TO BE HERE MONDAY.     PARK IN THE PARKING GARAGE AND CHECK IN AT REGISTRATION ON THE FIRST FLOOR THEN PROCEED TO THE SAME DAY SURGERY UNIT.     IF YOU ARE A SMOKER PLEASE DO NOT SMOKE OR VAPE THE DAY OF YOUR SURGERY.    DO NOT EAT ANYTHING AFTER MIDNIGHT THE NIGHT PRIOR TO SURGERY.  YOU MAY DRINK CLEAR LIQUIDS (WATER, BLACK COFFEE, JUICE WITHOUT THE PULP OR GATORADE) UP TO 2 HOURS PRIOR TO ARRIVAL TIME.     FOLLOW INSTRUCTIONS THAT ARE PRINTED OUT ON YOUR INSTRUCTION SHEET.     *IF YOU ARE A DIABETIC MAKE SURE YOU HOLD ALL DIABETIC MEDICATION THE MORNING OF YOUR SURGERY AND CHECK YOUR BLOOD SUGAR THE MORNING OF YOUR SURGERY.    HOLD ASPIRIN, ASPIRIN CONTAINING PRODUCTS, FISH OIL,  AND IBUPROFEN 7 DAYS PRIOR TO YOUR SURGERY.     IF YOU ARE ON PRESCRIBED BLOOD THINNERS PLEASE FOLLOW YOUR SURGEONS ORDERS OR THE PRESCRIBING DOCTORS ORDERS REGARDING THESE MEDICATIONS.     YOU MAY TAKE TYLENOL OR PRESCRIPTION PAIN MEDICATION IF NEEDED UP UNTIL THE DAY OF SURGERY    THE NIGHT BEFORE YOUR SURGERY USE 1/2 BOTTLE OF SKIN PREP FROM THE NECK DOWN, THE MORNING OF SURGERY USE THE OTHER 1/2 OF BOTTLE. DO NOT USE SKIN PREP ON FACE OR HAIR, YOU MAY USE YOUR OWN FACE WASH AND SHAMPOO/CONDITIONER.     NO LOTIONS, POWDERS OR PERFUME ON SKIN ONCE SKIN PREP IS USED.      DO NOT WEAR ANY JEWELRY OR BRING ANY VALUABLES WITH YOU .    WEAR LOOSE COMFORTABLE CLOTHING .    DO NOT WEAR CONTACT LENSES.    BRING A CASE FOR GLASSES, HEARING AIDES AND DENTURES.    BRING OVERNIGHT BAG IF INDICATED AND  CPAP MACHINE IF YOU USE ONE AT HOME.    MAKE SURE YOU HAVE TRANSPORTATION ARRANGEMENTS FOR YOUR DISCHARGE, YOU CAN NOT BE DISCHARGED TO A [facility], [facility] OR .    CALL YOUR DOCTOR FOR ANY CHANGED IN YOUR CONDITION PRIOR TO SURGERY.    IF YOU HAVE ANY QUESTIONS REGARDING YOUR  UPCOMING SURGERY CALL US [phone]

## 2024-12-13 ENCOUNTER — ANESTHESIA EVENT (OUTPATIENT)
Dept: SURGERY | Facility: HOSPITAL | Age: 74
End: 2024-12-13
Payer: MEDICARE

## 2024-12-13 ENCOUNTER — TELEPHONE (OUTPATIENT)
Dept: SURGERY | Facility: CLINIC | Age: 74
End: 2024-12-13
Payer: MEDICARE

## 2024-12-13 NOTE — TELEPHONE ENCOUNTER
----- Message from [place] sent at 12/13/2024  1:15 PM CST -----  Type: Needs Medical Advice  Who Called:  Patient   Symptoms (please be specific):    How long has patient had these symptoms:    Pharmacy name and phone #:    Best Call Back Number: [phone]  Additional Information: Patient is requesting a call back from the nurse regarding her upcoming procedure and arrival time with instructions.

## 2024-12-14 NOTE — ANESTHESIA PREPROCEDURE EVALUATION
12/13/2024  Essence Herrera is a 74 y.o., female.      Results for orders placed or performed during the hospital encounter of 12/10/24   EKG 12-lead    Collection Time: 12/10/24  9:15 AM   Result Value Ref Range    QRS Duration 80 ms    [facility]HS QTC Calculation 450 ms    Narrative    Test Reason : C50.919,    Vent. Rate :  60 BPM     Atrial Rate :  60 BPM     P-R Int : 136 ms          QRS Dur :  80 ms      QT Int : 450 ms       P-R-T Axes :  28  -1  24 degrees    QTcB Int : 450 ms    Normal sinus rhythm  Normal ECG  When compared with ECG of 18-Feb-2021 09:52,  No significant change was found  Confirmed by Jaime Call ([id number]) on 12/10/2024 9:15:15 PM    Referred By: GAYE CLIFTON           Confirmed By: Jaime Call             Lab Results   Component Value Date    WBC 9.25 03/02/2021    HGB 14.1 03/02/2021    HCT 42.6 03/02/2021    MCV 88 03/02/2021     (H) 03/02/2021     BMP  Lab Results   Component Value Date     12/12/2024    K 4.1 12/12/2024     12/12/2024    CO2 25 12/12/2024    BUN 14 12/12/2024    CREATININE 0.9 12/12/2024    CALCIUM 9.1 12/12/2024    ANIONGAP 6 (L) 12/12/2024    GLU 99 12/12/2024     03/02/2021     (H) 03/01/2021       Results for orders placed during the hospital encounter of 12/10/24    Echo    Interpretation Summary    Left Ventricle: The left ventricle is normal in size. Normal wall thickness. There is normal systolic function with a visually estimated ejection fraction of 55 - 60%. Global longitudinal strain is --21.0%. Grade I diastolic dysfunction. E/A ratio is 0.76.    Right Ventricle: Normal right ventricular cavity size. Wall thickness is normal. Systolic function is normal.    Tricuspid Valve: There is mild regurgitation. The estimated PA systolic pressure is at least 16 mmHg.    Pulmonic Valve: Not well visualized due to poor acoustic  window.    IVC/SVC: Normal venous pressure at 3 mmHg.         Pre-op Assessment    I have reviewed the Patient Summary Reports.     I have reviewed the Nursing Notes. I have reviewed the NPO Status.   I have reviewed the Medications.     Review of Systems  Anesthesia Hx:  No problems with previous Anesthesia             Denies Family Hx of Anesthesia complications.    Denies Personal Hx of Anesthesia complications.                    Social:  No Alcohol Use, Non-Smoker       Hematology/Oncology:  Hematology Normal                     Current/Recent Cancer.  Breast    right      Oncology Comments: Malignant neoplasm of upper-inner quadrant of right breast in female, estrogen receptor negative     EENT/Dental:  EENT/Dental Normal           Cardiovascular:     Hypertension, well controlled           hyperlipidemia   ECG has been reviewed.                            Pulmonary:  Pulmonary Normal        History of Acute respiratory failure with hypoxia               Renal/:  Renal/ Normal                 Hepatic/GI:        Mounjaro Therapy with last dose 2 weeks ago  Taking GLP-1 Agonists Instructed to Hold for 7 Days           Musculoskeletal:  Arthritis               Neurological:  Neurology Normal                                      Endocrine:  Endocrine Normal          Obesity / BMI > 30  Dermatological:  Skin Normal    Psych:  Psychiatric Normal                    Physical Exam  General: Well nourished, Cooperative, Alert and Oriented    Airway:  Mallampati: III / II  Mouth Opening: Normal  TM Distance: > 6 cm  Tongue: Normal  Neck ROM: Normal ROM    Dental:  Intact    Chest/Lungs:  Clear to auscultation, Normal Respiratory Rate    Heart:  Rate: Normal  Rhythm: Regular Rhythm        Anesthesia Plan  Type of Anesthesia, risks & benefits discussed:    Anesthesia Type: Gen Supraglottic Airway  Intra-op Monitoring Plan: Standard ASA Monitors  Post Op Pain Control Plan: multimodal analgesia and IV/PO Opioids  PRN  Induction:  IV  Airway Plan: Video  Informed Consent: Informed consent signed with the Patient and all parties understand the risks and agree with anesthesia plan.  All questions answered.   ASA Score: 3  Anesthesia Plan Notes:       General LMA   Benadryl 6.25mg iv, Decadron 8mg, iv Zofran 4mg iv, Pepcid 20mg iv   Ofirmev 1000mg iv  JOVANNA Precautions     Ready For Surgery From Anesthesia Perspective.     .

## 2024-12-16 ENCOUNTER — HOSPITAL ENCOUNTER (OUTPATIENT)
Facility: HOSPITAL | Age: 74
Discharge: HOME | End: 2024-12-16
Attending: SURGERY | Admitting: SURGERY
Payer: MEDICARE

## 2024-12-16 ENCOUNTER — ANESTHESIA (OUTPATIENT)
Dept: SURGERY | Facility: HOSPITAL | Age: 74
End: 2024-12-16
Payer: MEDICARE

## 2024-12-16 VITALS
HEART RATE: 52 BPM | SYSTOLIC BLOOD PRESSURE: 128 MMHG | BODY MASS INDEX: 37.82 KG/M2 | RESPIRATION RATE: 16 BRPM | WEIGHT: 227 LBS | HEIGHT: 65 IN | DIASTOLIC BLOOD PRESSURE: 69 MMHG | OXYGEN SATURATION: 100 % | TEMPERATURE: 98 F

## 2024-12-16 DIAGNOSIS — Z01.818 PRE-OP TESTING: ICD-10-CM

## 2024-12-16 DIAGNOSIS — C50.919 INVASIVE CARCINOMA OF BREAST: Primary | ICD-10-CM

## 2024-12-16 LAB
BASOPHILS # BLD AUTO: 0.02 K/UL (ref 0–0.2)
BASOPHILS NFR BLD: 0.4 % (ref 0–1.9)
DIFFERENTIAL METHOD BLD: NORMAL
EOSINOPHIL # BLD AUTO: 0.1 K/UL (ref 0–0.5)
EOSINOPHIL NFR BLD: 1.9 % (ref 0–8)
ERYTHROCYTE [DISTWIDTH] IN BLOOD BY AUTOMATED COUNT: 13.7 % (ref 11.5–14.5)
HCT VFR BLD AUTO: 38.2 % (ref 37–48.5)
HGB BLD-MCNC: 12.7 G/DL (ref 12–16)
IMM GRANULOCYTES # BLD AUTO: 0.01 K/UL (ref 0–0.04)
IMM GRANULOCYTES NFR BLD AUTO: 0.2 % (ref 0–0.5)
LYMPHOCYTES # BLD AUTO: 2.4 K/UL (ref 1–4.8)
LYMPHOCYTES NFR BLD: 47 % (ref 18–48)
MCH RBC QN AUTO: 29.8 PG (ref 27–31)
MCHC RBC AUTO-ENTMCNC: 33.2 G/DL (ref 32–36)
MCV RBC AUTO: 90 FL (ref 82–98)
MONOCYTES # BLD AUTO: 0.3 K/UL (ref 0.3–1)
MONOCYTES NFR BLD: 6.2 % (ref 4–15)
NEUTROPHILS # BLD AUTO: 2.3 K/UL (ref 1.8–7.7)
NEUTROPHILS NFR BLD: 44.3 % (ref 38–73)
NRBC BLD-RTO: 0 /100 WBC
PLATELET # BLD AUTO: 216 K/UL (ref 150–450)
PMV BLD AUTO: 11.6 FL (ref 9.2–12.9)
RBC # BLD AUTO: 4.26 M/UL (ref 4–5.4)
WBC # BLD AUTO: 5.13 K/UL (ref 3.9–12.7)

## 2024-12-16 PROCEDURE — 71000015 HC POSTOP RECOV 1ST HR: Performed by: SURGERY

## 2024-12-16 PROCEDURE — 63600175 PHARM REV CODE 636 W HCPCS: Performed by: SURGERY

## 2024-12-16 PROCEDURE — 36000707: Performed by: SURGERY

## 2024-12-16 PROCEDURE — 36000706: Performed by: SURGERY

## 2024-12-16 PROCEDURE — 25000003 PHARM REV CODE 250: Performed by: SURGERY

## 2024-12-16 PROCEDURE — 71000033 HC RECOVERY, INTIAL HOUR: Performed by: SURGERY

## 2024-12-16 PROCEDURE — 36561 INSERT TUNNELED CV CATH: CPT | Mod: RT,,, | Performed by: SURGERY

## 2024-12-16 PROCEDURE — D9220A PRA ANESTHESIA: Mod: ANES,,, | Performed by: ANESTHESIOLOGY

## 2024-12-16 PROCEDURE — 25000003 PHARM REV CODE 250: Performed by: ANESTHESIOLOGY

## 2024-12-16 PROCEDURE — 37000009 HC ANESTHESIA EA ADD 15 MINS: Performed by: SURGERY

## 2024-12-16 PROCEDURE — C1788 PORT, INDWELLING, IMP: HCPCS | Performed by: SURGERY

## 2024-12-16 PROCEDURE — 37000008 HC ANESTHESIA 1ST 15 MINUTES: Performed by: SURGERY

## 2024-12-16 PROCEDURE — 85025 COMPLETE CBC W/AUTO DIFF WBC: CPT | Performed by: SURGERY

## 2024-12-16 PROCEDURE — 25000003 PHARM REV CODE 250

## 2024-12-16 PROCEDURE — 63600175 PHARM REV CODE 636 W HCPCS

## 2024-12-16 PROCEDURE — D9220A PRA ANESTHESIA: Mod: CRNA,,,

## 2024-12-16 DEVICE — KIT POWERPORT SINGLE 8FR: Type: IMPLANTABLE DEVICE | Site: CHEST  WALL | Status: FUNCTIONAL

## 2024-12-16 RX ORDER — FAMOTIDINE 10 MG/ML
INJECTION INTRAVENOUS
Status: DISCONTINUED | OUTPATIENT
Start: 2024-12-16 | End: 2024-12-16

## 2024-12-16 RX ORDER — OXYCODONE HYDROCHLORIDE 5 MG/1
5 TABLET ORAL
Status: DISCONTINUED | OUTPATIENT
Start: 2024-12-16 | End: 2024-12-16 | Stop reason: HOSPADM

## 2024-12-16 RX ORDER — ONDANSETRON HYDROCHLORIDE 2 MG/ML
4 INJECTION, SOLUTION INTRAVENOUS DAILY PRN
Status: DISCONTINUED | OUTPATIENT
Start: 2024-12-16 | End: 2024-12-16 | Stop reason: HOSPADM

## 2024-12-16 RX ORDER — FENTANYL CITRATE 50 UG/ML
25 INJECTION, SOLUTION INTRAMUSCULAR; INTRAVENOUS EVERY 5 MIN PRN
Status: DISCONTINUED | OUTPATIENT
Start: 2024-12-16 | End: 2024-12-16 | Stop reason: HOSPADM

## 2024-12-16 RX ORDER — PROPOFOL 10 MG/ML
VIAL (ML) INTRAVENOUS
Status: DISCONTINUED | OUTPATIENT
Start: 2024-12-16 | End: 2024-12-16

## 2024-12-16 RX ORDER — DEXAMETHASONE SODIUM PHOSPHATE 4 MG/ML
INJECTION, SOLUTION INTRA-ARTICULAR; INTRALESIONAL; INTRAMUSCULAR; INTRAVENOUS; SOFT TISSUE
Status: DISCONTINUED | OUTPATIENT
Start: 2024-12-16 | End: 2024-12-16

## 2024-12-16 RX ORDER — DIPHENHYDRAMINE HYDROCHLORIDE 50 MG/ML
12.5 INJECTION INTRAMUSCULAR; INTRAVENOUS ONCE AS NEEDED
Status: DISCONTINUED | OUTPATIENT
Start: 2024-12-16 | End: 2024-12-16 | Stop reason: HOSPADM

## 2024-12-16 RX ORDER — ONDANSETRON 4 MG/1
4 TABLET, ORALLY DISINTEGRATING ORAL ONCE
Status: DISCONTINUED | OUTPATIENT
Start: 2024-12-16 | End: 2024-12-16 | Stop reason: HOSPADM

## 2024-12-16 RX ORDER — OXYCODONE HYDROCHLORIDE 5 MG/1
5 TABLET ORAL EVERY 4 HOURS PRN
Status: DISCONTINUED | OUTPATIENT
Start: 2024-12-16 | End: 2024-12-16 | Stop reason: HOSPADM

## 2024-12-16 RX ORDER — GLUCAGON 1 MG
1 KIT INJECTION
Status: DISCONTINUED | OUTPATIENT
Start: 2024-12-16 | End: 2024-12-16 | Stop reason: HOSPADM

## 2024-12-16 RX ORDER — BUPIVACAINE HCL/EPINEPHRINE 0.25-.0005
VIAL (ML) INJECTION
Status: DISCONTINUED | OUTPATIENT
Start: 2024-12-16 | End: 2024-12-16 | Stop reason: HOSPADM

## 2024-12-16 RX ORDER — FENTANYL CITRATE 50 UG/ML
INJECTION, SOLUTION INTRAMUSCULAR; INTRAVENOUS
Status: DISCONTINUED | OUTPATIENT
Start: 2024-12-16 | End: 2024-12-16

## 2024-12-16 RX ORDER — LIDOCAINE HYDROCHLORIDE 10 MG/ML
INJECTION, SOLUTION EPIDURAL; INFILTRATION; INTRACAUDAL; PERINEURAL
Status: DISCONTINUED | OUTPATIENT
Start: 2024-12-16 | End: 2024-12-16

## 2024-12-16 RX ORDER — ACETAMINOPHEN 10 MG/ML
INJECTION, SOLUTION INTRAVENOUS
Status: DISCONTINUED | OUTPATIENT
Start: 2024-12-16 | End: 2024-12-16

## 2024-12-16 RX ORDER — CEFAZOLIN 2 G/1
2 INJECTION, POWDER, FOR SOLUTION INTRAMUSCULAR; INTRAVENOUS
Status: DISCONTINUED | OUTPATIENT
Start: 2024-12-16 | End: 2024-12-16 | Stop reason: HOSPADM

## 2024-12-16 RX ORDER — ONDANSETRON HYDROCHLORIDE 2 MG/ML
INJECTION, SOLUTION INTRAMUSCULAR; INTRAVENOUS
Status: DISCONTINUED | OUTPATIENT
Start: 2024-12-16 | End: 2024-12-16

## 2024-12-16 RX ORDER — DIPHENHYDRAMINE HYDROCHLORIDE 50 MG/ML
INJECTION INTRAMUSCULAR; INTRAVENOUS
Status: DISCONTINUED | OUTPATIENT
Start: 2024-12-16 | End: 2024-12-16

## 2024-12-16 RX ORDER — HEPARIN SODIUM 1000 [USP'U]/ML
INJECTION, SOLUTION INTRAVENOUS; SUBCUTANEOUS
Status: DISCONTINUED | OUTPATIENT
Start: 2024-12-16 | End: 2024-12-16 | Stop reason: HOSPADM

## 2024-12-16 RX ORDER — SODIUM CHLORIDE, SODIUM LACTATE, POTASSIUM CHLORIDE, CALCIUM CHLORIDE 600; 310; 30; 20 MG/100ML; MG/100ML; MG/100ML; MG/100ML
INJECTION, SOLUTION INTRAVENOUS CONTINUOUS PRN
Status: DISCONTINUED | OUTPATIENT
Start: 2024-12-16 | End: 2024-12-16

## 2024-12-16 RX ADMIN — DIPHENHYDRAMINE HYDROCHLORIDE 6.25 MG: 50 INJECTION INTRAMUSCULAR; INTRAVENOUS at 07:12

## 2024-12-16 RX ADMIN — OXYCODONE HYDROCHLORIDE 5 MG: 5 TABLET ORAL at 08:12

## 2024-12-16 RX ADMIN — DEXTROSE 2 G: 50 INJECTION, SOLUTION INTRAVENOUS at 07:12

## 2024-12-16 RX ADMIN — ONDANSETRON 4 MG: 2 INJECTION INTRAMUSCULAR; INTRAVENOUS at 07:12

## 2024-12-16 RX ADMIN — PROPOFOL 180 MG: 10 INJECTION, EMULSION INTRAVENOUS at 07:12

## 2024-12-16 RX ADMIN — FENTANYL CITRATE 50 MCG: 50 INJECTION, SOLUTION INTRAMUSCULAR; INTRAVENOUS at 07:12

## 2024-12-16 RX ADMIN — LIDOCAINE HYDROCHLORIDE 50 MG: 10 INJECTION, SOLUTION EPIDURAL; INFILTRATION; INTRACAUDAL; PERINEURAL at 07:12

## 2024-12-16 RX ADMIN — FAMOTIDINE 20 MG: 10 INJECTION, SOLUTION INTRAVENOUS at 07:12

## 2024-12-16 RX ADMIN — ACETAMINOPHEN 1000 MG: 10 INJECTION, SOLUTION INTRAVENOUS at 07:12

## 2024-12-16 RX ADMIN — SODIUM CHLORIDE, SODIUM LACTATE, POTASSIUM CHLORIDE, AND CALCIUM CHLORIDE: .6; .31; .03; .02 INJECTION, SOLUTION INTRAVENOUS at 07:12

## 2024-12-16 RX ADMIN — DEXAMETHASONE SODIUM PHOSPHATE 4 MG: 4 INJECTION, SOLUTION INTRAMUSCULAR; INTRAVENOUS at 07:12

## 2024-12-16 NOTE — ANESTHESIA PROCEDURE NOTES
Intubation    Date/Time: 12/16/2024 7:33 AM    Performed by: Karlene Lutz CRNA  Authorized by: Baldemar Holloway MD    Intubation:     Induction:  Intravenous    Intubated:  Postinduction    Mask Ventilation:  Easy mask    Attempts:  1    Attempted By:  CRNA    Difficult Airway Encountered?: No      Complications:  None    Airway Device:  Supraglottic airway/LMA    Airway Device Size:  4.0    Secured at:  The lips    Placement Verified By:  Capnometry    Complicating Factors:  None    Findings Post-Intubation:  BS equal bilateral and atraumatic/condition of teeth unchanged

## 2024-12-16 NOTE — OP NOTE
DATE OF PROCEDURE: 12/16/2024    PREOPERATIVE DIAGNOSIS: Triple negative breast cancer    POSTOPERATIVE DIAGNOSIS: Same     PROCEDURE: Right IJ Port-A-Cath Placement    SURGEON: Juaquin Sanchez M.D.    ASSISTANT: None    ANESTHESIA: General    PREP: Chlorhexidine    ESTIMATED BLOOD LOSS: Minimal    INDICATION: Access for infusions    FINDINGS:  -  the IJ was found to be widely patent on US evaluation  -  IJ accessed using ultrasound with return of dark red nonpulsatile blood.  -  Wire easily passed and confirmed in the venous system on fluoro  -  Catheter tip positioned at the atrial caval junction under fluoro  -  Port aspirated and flushed with ease.    PROCEDURE IN DETAIL:  The patient was identified in the preoperative unit and taken back to the operating room and laid supine on the operating room table. IV antibiotics were administered prior to the start of anesthesia. General anesthesia was administered without complication. The patient was then prepped and draped in the standard sterile fashion. The ultrasound was used to identify the right internal jugular vein.  Local anesthetic was injected and then a small skin nick was made.  The patient was placed into the Trendelenburg position and an 18g needle was used to access the vein under ultrasound guidance.We had return of dark red, non-pulsatile blood. The wire was advanced without issue under fluoroscopic guidance. We turned our attention to creation of the port pocket in the upper chest. Local anesthetic was injected into the upper chest in the location of the port pocket and along the path of the planned catheter tunnel. A 4cm incision was made sharpley and the subcutaneous tissue was dissected until an appropriate sized pocket was created to accommodate the port. Once we were satisfied with the pocket, the catheter was flushed and tunneled from the chest to the neck incision. Under fluoroscopic guidance the dilator and sheath were placed over the wire  using Seldinger technique. The dilator and wire were removed and the catheter was introduced and fed into the sheath as the sheath was peeled away. Fluoro was used to confirm the position of the catheter in the atriocaval junction and confirm that the catheter was not kinked. The catheter was then cut to size and attached to the port which was then placed in the chest wall pocket. The port was then access and easily aspirated blood. It was then flushed with heparinized saline. Hemostasis was assured and the dermis of the port pocket was re-approximated with 3-0 vicryl suture in an interrupted fashion. The skin was re-approximated using 4-0 monocryl suture in a running fashion. Dermabond was then applied. The patient was awakened from anesthesia without complication and returned to the postoperative recovery unit in stable condition. At the end of the case, sponge, instrument, and needle counts were correct and hemostasis was achieved. I was present and scrubbed throughout the entirety of the case. A post-operative CXR will be obtained to confirm appropriate catheter location and to rule out pneumothorax.      All images were personally interpreted by me and stored.    COMPLICATIONS: None    CONDITION:  Stable    DISPO: To PACU then home after CXR    Juaquin Sanchez Jr

## 2024-12-16 NOTE — DISCHARGE SUMMARY
[facility]  Discharge Note  Short Stay    Procedure(s) (LRB):  [id number] (Right)      OUTCOME: Patient tolerated treatment/procedure well without complication and is now ready for discharge.    DISPOSITION: Home or Self Care    FINAL DIAGNOSIS:  <principal problem not specified>    FOLLOWUP: None    DISCHARGE INSTRUCTIONS:    Discharge Procedure Orders   Diet Adult Regular     Ice to affected area     Lifting restrictions   Order Comments: Please avoid lifting greater than 40 lb, straining, strenuous activity for one week.     Change dressing (specify)   Order Comments: Post-Operative Wound Care    A surgical glue has been placed over your incisions, please leave the glue in place and do not attempt to remove it.  It is ok to shower using mild soap and water over the incisions the day after your procedure. Pat dry your incisions. Do not soak in a bathtub or other body of water for 2 weeks or until cleared by your surgeon.     If you noticed redness, swelling, fever, increasing pain or significant drainage from your wound please call/message the office or the 24 hr nurse hotline after hours.     Notify your health care provider if you experience any of the following:  temperature >100.4     Notify your health care provider if you experience any of the following:  persistent nausea and vomiting or diarrhea     Notify your health care provider if you experience any of the following:  severe uncontrolled pain     Notify your health care provider if you experience any of the following:  redness, tenderness, or signs of infection (pain, swelling, redness, odor or green/yellow discharge around incision site)     Notify your health care provider if you experience any of the following:  worsening rash     Notify your health care provider if you experience any of the following:  increased confusion or weakness     Shower on day dressing removed (No bath)        TIME SPENT ON DISCHARGE: 10 minutes

## 2024-12-16 NOTE — PLAN OF CARE
Patient taken to day surgery via stretcher at this time. Awake and alert not distressful. JOSE LUIS Anguiano at bedside to assume care of patient  
Pt oriented to unit and educated on procedure, fall prevention, pain management.  Pt encouraged to verbalize any questions or concerns.  Call light in reach, pt instructed on use.  
written material/verbal instruction

## 2024-12-16 NOTE — ANESTHESIA POSTPROCEDURE EVALUATION
Anesthesia Post Evaluation    Patient: Essence Herrera    Procedure(s) Performed: Procedure(s) (LRB):  [id number] (Right)    Final Anesthesia Type: general      Patient location during evaluation: PACU  Patient participation: Yes- Able to Participate  Level of consciousness: awake and alert, oriented and awake  Post-procedure vital signs: reviewed and stable  Pain management: adequate  Airway patency: patent    PONV status at discharge: No PONV  Anesthetic complications: no      Cardiovascular status: blood pressure returned to baseline, hemodynamically stable and stable  Respiratory status: unassisted, spontaneous ventilation and room air  Hydration status: euvolemic  Follow-up not needed.              Vitals Value Taken Time   /72 12/16/24 0845   Temp 36.6 °C (97.8 °F) 12/16/24 0845   Pulse 55 12/16/24 0850   Resp 16 12/16/24 0850   SpO2 100 % 12/16/24 0850   Vitals shown include unfiled device data.      No case tracking events are documented in the log.      Pain/Tea Score: Pain Rating Prior to Med Admin: 4 (12/16/2024  8:30 AM)  Tea Score: 10 (12/16/2024  8:45 AM)

## 2024-12-16 NOTE — TRANSFER OF CARE
"Anesthesia Transfer of Care Note    Patient: Essence Herrera    Procedure(s) Performed: Procedure(s) (LRB):  [id number] (Right)    Patient location: PACU    Anesthesia Type: general    Transport from OR: Transported from OR on room air with adequate spontaneous ventilation    Post pain: adequate analgesia    Post assessment: no apparent anesthetic complications    Post vital signs: stable    Level of consciousness: awake, alert and oriented    Nausea/Vomiting: no nausea/vomiting    Complications: none    Transfer of care protocol was followed      Last vitals: Visit Vitals  BP (!) 140/80 (BP Location: Left arm, Patient Position: Lying)   Pulse 67   Temp 36.7 °C (98 °F) (Oral)   Resp 16   Ht 5' 5" (1.651 m)   Wt 103 kg (227 lb)   SpO2 100%   Breastfeeding No   BMI 37.77 kg/m²     "

## 2024-12-16 NOTE — DISCHARGE INSTRUCTIONS
No driving, operating machinery or signing legal documents for 24 hours.  You are at risk for falling for 24 hours due to anesthesia/sedation/pain medications received today.  Rest for the remainder of the day and make sure to wear nonskid socks or shoes when ambulating.  Use assistive devices to ambulate if applicable.

## 2024-12-18 ENCOUNTER — HOSPITAL ENCOUNTER (OUTPATIENT)
Dept: RADIOLOGY | Facility: HOSPITAL | Age: 74
Discharge: HOME | End: 2024-12-18
Attending: INTERNAL MEDICINE
Payer: MEDICARE

## 2024-12-18 DIAGNOSIS — C50.919 INVASIVE CARCINOMA OF BREAST: ICD-10-CM

## 2024-12-18 LAB — GLUCOSE SERPL-MCNC: 83 MG/DL (ref 70–110)

## 2024-12-18 PROCEDURE — A9552 F18 FDG: HCPCS | Mod: PN | Performed by: INTERNAL MEDICINE

## 2024-12-18 PROCEDURE — 78815 PET IMAGE W/CT SKULL-THIGH: CPT | Mod: TC,PN

## 2024-12-18 PROCEDURE — 78815 PET IMAGE W/CT SKULL-THIGH: CPT | Mod: 26,PI,, | Performed by: STUDENT IN AN ORGANIZED HEALTH CARE EDUCATION/TRAINING PROGRAM

## 2024-12-18 RX ORDER — FLUDEOXYGLUCOSE F18 500 MCI/ML
12 INJECTION INTRAVENOUS
Status: COMPLETED | OUTPATIENT
Start: 2024-12-18 | End: 2024-12-18

## 2024-12-18 RX ADMIN — FLUDEOXYGLUCOSE F-18 13.4 MILLICURIE: 500 INJECTION INTRAVENOUS at 08:12

## 2024-12-18 NOTE — PROGRESS NOTES
PET Imaging Questionnaire    Are you a Diabetic? Recent Blood Sugar level? No    Are you anemic? Bone Marrow Stimulation Meds? No    Have you had a CT Scan, if so when & where was your last one? No    Have you had a PET Scan, if so when & where was your last one? No    Chemotherapy or currently on Chemotherapy? No    Radiation therapy? No    Surgical History:   Past Surgical History:   Procedure Laterality Date    BUNIONECTOMY Bilateral 1996    CARPAL TUNNEL RELEASE Bilateral 1998    DILATION AND CURETTAGE OF UTERUS      HYSTERECTOMY      TVH with conservation of the ovaries    INSERTION OF TUNNELED CENTRAL VENOUS CATHETER (CVC) WITH SUBCUTANEOUS PORT Right 12/16/2024    Procedure: [id number];  Surgeon: Juaquin Sanchez Jr., MD;  Location: [facility] OR;  Service: General;  Laterality: Right;    ROTATOR CUFF REPAIR Right 2005    TUBAL LIGATION          Have you been fasting for at least 6 hours? Yes    Is there any chance you may be pregnant or breastfeeding? No    Assay: 14.2 MCi@:809   Injection Site:lt hand     Residual: .779 mCi@: 811   Technologist: Luz Maria Muñiz Injected:13.4mCi

## 2024-12-19 ENCOUNTER — OFFICE VISIT (OUTPATIENT)
Dept: HEMATOLOGY/ONCOLOGY | Facility: CLINIC | Age: 74
End: 2024-12-19
Payer: MEDICARE

## 2024-12-19 VITALS
HEIGHT: 65 IN | OXYGEN SATURATION: 97 % | DIASTOLIC BLOOD PRESSURE: 65 MMHG | HEART RATE: 53 BPM | SYSTOLIC BLOOD PRESSURE: 140 MMHG | TEMPERATURE: 97 F | BODY MASS INDEX: 38.53 KG/M2 | RESPIRATION RATE: 18 BRPM | WEIGHT: 231.25 LBS

## 2024-12-19 DIAGNOSIS — G47.00 INSOMNIA, UNSPECIFIED TYPE: ICD-10-CM

## 2024-12-19 DIAGNOSIS — C50.211 MALIGNANT NEOPLASM OF UPPER-INNER QUADRANT OF RIGHT BREAST IN FEMALE, ESTROGEN RECEPTOR NEGATIVE: Primary | ICD-10-CM

## 2024-12-19 DIAGNOSIS — Z17.1 MALIGNANT NEOPLASM OF UPPER-INNER QUADRANT OF RIGHT BREAST IN FEMALE, ESTROGEN RECEPTOR NEGATIVE: Primary | ICD-10-CM

## 2024-12-19 PROCEDURE — 3077F SYST BP >= 140 MM HG: CPT | Mod: CPTII,S$GLB,, | Performed by: NURSE PRACTITIONER

## 2024-12-19 PROCEDURE — 99215 OFFICE O/P EST HI 40 MIN: CPT | Mod: S$GLB,,, | Performed by: NURSE PRACTITIONER

## 2024-12-19 PROCEDURE — 3008F BODY MASS INDEX DOCD: CPT | Mod: CPTII,S$GLB,, | Performed by: NURSE PRACTITIONER

## 2024-12-19 PROCEDURE — 99999 PR PBB SHADOW E&M-EST. PATIENT-LVL IV: CPT | Mod: PBBFAC,,, | Performed by: NURSE PRACTITIONER

## 2024-12-19 PROCEDURE — 3288F FALL RISK ASSESSMENT DOCD: CPT | Mod: CPTII,S$GLB,, | Performed by: NURSE PRACTITIONER

## 2024-12-19 PROCEDURE — 1126F AMNT PAIN NOTED NONE PRSNT: CPT | Mod: CPTII,S$GLB,, | Performed by: NURSE PRACTITIONER

## 2024-12-19 PROCEDURE — 3078F DIAST BP <80 MM HG: CPT | Mod: CPTII,S$GLB,, | Performed by: NURSE PRACTITIONER

## 2024-12-19 PROCEDURE — 1101F PT FALLS ASSESS-DOCD LE1/YR: CPT | Mod: CPTII,S$GLB,, | Performed by: NURSE PRACTITIONER

## 2024-12-19 PROCEDURE — 1159F MED LIST DOCD IN RCRD: CPT | Mod: CPTII,S$GLB,, | Performed by: NURSE PRACTITIONER

## 2024-12-19 RX ORDER — PROCHLORPERAZINE MALEATE 10 MG
10 TABLET ORAL EVERY 6 HOURS PRN
Qty: 30 TABLET | Refills: 1 | Status: SHIPPED | OUTPATIENT
Start: 2024-12-19 | End: 2025-12-19

## 2024-12-19 RX ORDER — DEXAMETHASONE 4 MG/1
8 TABLET ORAL SEE ADMIN INSTRUCTIONS
Qty: 24 TABLET | Refills: 3 | Status: SHIPPED | OUTPATIENT
Start: 2024-12-19

## 2024-12-19 RX ORDER — OLANZAPINE 5 MG/1
5 TABLET ORAL SEE ADMIN INSTRUCTIONS
Qty: 30 TABLET | Refills: 0 | Status: SHIPPED | OUTPATIENT
Start: 2024-12-19

## 2024-12-19 RX ORDER — ONDANSETRON 8 MG/1
8 TABLET, ORALLY DISINTEGRATING ORAL EVERY 8 HOURS PRN
Qty: 60 TABLET | Refills: 5 | Status: SHIPPED | OUTPATIENT
Start: 2024-12-19

## 2024-12-19 RX ORDER — TRAZODONE HYDROCHLORIDE 50 MG/1
50 TABLET ORAL NIGHTLY
Qty: 30 TABLET | Refills: 11 | Status: SHIPPED | OUTPATIENT
Start: 2024-12-19 | End: 2025-12-19

## 2024-12-19 RX ORDER — LIDOCAINE AND PRILOCAINE 25; 25 MG/G; MG/G
CREAM TOPICAL
Qty: 30 G | Refills: 0 | Status: SHIPPED | OUTPATIENT
Start: 2024-12-19

## 2024-12-19 NOTE — PROGRESS NOTES
FOLLOW-UP APPOINTMENT    PATIENT:   Essence Herrera  :    1950  MR#:    [id number]  DATE OF VISIT:  2024      Chief Complaint:  Triple negative breast cancer    HPI:   Ms. Essence Herrera presents today for chemotherapy education.  She will be starting treatment with Adriamycin/Cytoxan every 2 weeks followed by Taxol every 2 weeks for the above diagnosis.       Review of Systems   Constitutional:  Positive for fatigue. Negative for appetite change and unexpected weight change.   HENT:  Negative.  Negative for mouth sores.    Eyes: Negative.    Respiratory: Negative.  Negative for cough and shortness of breath.    Cardiovascular: Negative.  Negative for chest pain.   Gastrointestinal: Negative.  Negative for abdominal pain and diarrhea.   Endocrine: Negative.    Genitourinary: Negative.  Negative for frequency.    Musculoskeletal: Negative.  Negative for back pain.   Skin: Negative.  Negative for rash.   Neurological: Negative.  Negative for headaches.   Hematological: Negative.  Negative for adenopathy.   Psychiatric/Behavioral: Negative.  The patient is not nervous/anxious.      Oncology History   Malignant neoplasm of upper-inner quadrant of right breast in female, estrogen receptor negative   10/28/2024 Cancer Staged    Staging form: Breast, AJCC 8th Edition  - Clinical stage from 10/28/2024: Stage IB (cT1c, cN0, cM0, G2, ER-, NE-, HER2-)     2024 Initial Diagnosis    Malignant neoplasm of upper-outer quadrant of left breast in female, estrogen receptor negative     12/10/2024 -  Chemotherapy    Treatment Summary   Plan Name: OP BREAST DOSE-DENSE AC-T (DOXORUBICIN CYCLOPHOSPHAMIDE Q2W FOLLOWED BY PACLITAXEL Q2W)  Treatment Goal: Curative  Status: Active  Start Date: 12/10/2024 (Planned)  End Date: 3/19/2025 (Planned)  Provider: Aurash Khoobehi, MD  Chemotherapy: DOXOrubicin chemo injection, 60 mg/m2, Intravenous, Clinic/HOD 1 time, 0 of 4 cycles  cycloPHOSphamide in 0.9% NaCl 250 mL chemo  infusion, 600 mg/m2, Intravenous, Clinic/HOD 1 time, 0 of 4 cycles  PACLitaxeL (TAXOL) in 0.9% NaCl 500 mL chemo infusion, 175 mg/m2, Intravenous, Clinic/HOD 1 time, 0 of 4 cycles         Patient Active Problem List   Diagnosis    Pneumonia due to COVID-19 virus    Essential hypertension    Hyperlipidemia    Acute respiratory failure with hypoxia    Severe obesity (BMI 35.0-39.9) with comorbidity    Malignant neoplasm of upper-inner quadrant of right breast in female, estrogen receptor negative       Past Medical History:   Diagnosis Date    Hyperlipidemia     Hypertension        Past Surgical History:   Procedure Laterality Date    BUNIONECTOMY Bilateral 1996    CARPAL TUNNEL RELEASE Bilateral 1998    DILATION AND CURETTAGE OF UTERUS      HYSTERECTOMY      TVH with conservation of the ovaries    INSERTION OF TUNNELED CENTRAL VENOUS CATHETER (CVC) WITH SUBCUTANEOUS PORT Right 12/16/2024    Procedure: [id number];  Surgeon: Juaquin Sanchez Jr., MD;  Location: [facility] OR;  Service: General;  Laterality: Right;    ROTATOR CUFF REPAIR Right 2005    TUBAL LIGATION         Social History     Socioeconomic History    Marital status:    Tobacco Use    Smoking status: Never    Smokeless tobacco: Never   Substance and Sexual Activity    Alcohol use: Not Currently    Drug use: Never    Sexual activity: Not Currently     Social Drivers of Health     Financial Resource Strain: Low Risk  (12/16/2024)    Overall Financial Resource Strain (CARDIA)     Difficulty of Paying Living Expenses: Not very hard   Food Insecurity: No Food Insecurity (12/16/2024)    Hunger Vital Sign     Worried About Running Out of Food in the Last Year: Never true     Ran Out of Food in the Last Year: Never true   Physical Activity: Unknown (12/16/2024)    Exercise Vital Sign     Days of Exercise per Week: 0 days   Stress: No Stress Concern Present (12/16/2024)    [employer] - Occupational Stress  Questionnaire     Feeling of Stress : Not at all   Housing Stability: Unknown (12/16/2024)    Housing Stability Vital Sign     Unable to Pay for Housing in the Last Year: No       Family History   Problem Relation Name Age of Onset    Breast cancer Maternal Grandfather      Breast cancer Maternal Aunt           Current Outpatient Medications:     atorvastatin (LIPITOR) 20 MG tablet, Take 20 mg by mouth once daily., Disp: , Rfl:     MOUNJARO 2.5 mg/0.5 mL PnIj, Inject 2.5 mg into the skin once a week., Disp: , Rfl:     olmesartan-hydrochlorothiazide (BENICAR HCT) 20-12.5 mg per tablet, Take 1 tablet by mouth once daily., Disp: , Rfl:     Review of patient's allergies indicates:  No Known Allergies    Physcial Examination  VITAL SIGNS:    There is no height or weight on file to calculate BSA.   Pain Assessment: No pain   There were no vitals filed for this visit.       Wt Readings from Last 5 Encounters:   12/16/24 103 kg (227 lb)   12/12/24 103 kg (227 lb)   12/10/24 104.3 kg (230 lb)   12/09/24 104.3 kg (230 lb)   12/09/24 104.3 kg (230 lb)       GENERAL:  Essence Herrera is healthy-appearing 74 y.o. female, in no distress.   EYES:   Pupils equal, round, reactive.  Conjunctivae, sclera and lids normal.  HEENT: Head normocephalic and atraumatic, without alopecia.  Oropharynx is unremarkable.  No icterus, jaundice, stomatitis, mucositis, or ulceration is noted.  Ears are clear and unremarkable.  Nose, nares, and septum are unremarkable.    NECK:   No masses.  Thyroid and trachea are normal.    BREASTS:  Deferred.  RESPIRATORY: Clear to auscultation bilaterally.  Symmetrically effortless expansion.  No wheezing and no stridor.    CV: Heart reveals regular rate and rhythm without murmur, rub, or gallops.  ABDOMEN: Soft, non-tender.  No masses, no hernias, and no rebound or rigidity are noted.  /RECTAL:  Deferred.  LYMPHATICS: No preauricular, submandibular, cervical, supraclavicular, axillary,  lymphadenopathy.  MUSCULOSKELETAL:Fair musculature, no atrophy.  No arthritic changes.  No edema or cyanosis. Back is without gross abnormal curvature.   NEUROLOGICAL: Cranial nerves II-XII grossly intact.  Motor and sensory exam intact.  SKIN:   No lesions, bruises, petechiae or rashes.  Good turgor.    PSYCHIATRIC: Patient is alert and oriented to time, place and person.  Mood and affect are appropriate.         Laboratory and Radiology   Lab Results   Component Value Date    WBC 5.13 12/16/2024    RBC 4.26 12/16/2024    HGB 12.7 12/16/2024    HCT 38.2 12/16/2024    MCV 90 12/16/2024    MCH 29.8 12/16/2024    MCHC 33.2 12/16/2024    RDW 13.7 12/16/2024     12/16/2024    MPV 11.6 12/16/2024    GRAN 2.3 12/16/2024    GRAN 44.3 12/16/2024    LYMPH 2.4 12/16/2024    LYMPH 47.0 12/16/2024    MONO 0.3 12/16/2024    MONO 6.2 12/16/2024    EOS 0.1 12/16/2024    BASO 0.02 12/16/2024    EOSINOPHIL 1.9 12/16/2024    BASOPHIL 0.4 12/16/2024     BMP  Lab Results   Component Value Date     12/12/2024    K 4.1 12/12/2024     12/12/2024    CO2 25 12/12/2024    BUN 14 12/12/2024    CREATININE 0.9 12/12/2024    CALCIUM 9.1 12/12/2024    ANIONGAP 6 (L) 12/12/2024    ESTGFRAFRICA >60.0 03/02/2021    EGFRNONAA >60.0 03/02/2021     Lab Results   Component Value Date    ALT 7 (L) 12/12/2024    AST 13 12/12/2024    ALKPHOS 88 12/12/2024    BILITOT 0.7 12/12/2024     No results found for this or any previous visit (from the past 2160 hours).  Results for orders placed or performed during the hospital encounter of 12/18/24 (from the past 2160 hours)   NM PET CT FDG Skull Base to Mid Thigh    Impression    Small focus of mild radiotracer uptake in the right breast adjacent to a biopsy marker likely representing patient's known breast cancer.  No evidence of metastatic disease.      Electronically signed by: Elio Munoz  Date:    12/18/2024  Time:    11:36     No results found for this or any previous visit (from the  past 2160 hours).    Pathology  Pathology Results  (Last 10 years)                 10/25/22 0950  Liquid-Based Pap Smear, Screening Final result    Narrative:  Release to patient->Immediate               TITLE: PLAN OF CARE FOR THE CHEMOTHERAPY PATIENT / TEACHING PROTOCOL    PURPOSE: To involve the patient / significant other in the plan of care and to provide teaching to the significant other & patient receiving chemotherapy.    LEVEL: Independent.    CONTENT: The Plan of Care for the chemotherapy patient is individualized and appropriate to the patients needs, strengths, limitations, & goals.  Education includes information regarding chemotherapy side effects, the treatment itself, and self-care  Activities.    GOAL / OUTCOME STANDARDS    PHYSIOLOGIC: The client will remain free or experience minimal side effects or toxicities throughout the chemotherapy treatment period.     PSYCHOLOGIC: The client/significant others will demonstrate positive coping mechanisms in relation to chemotherapy and its side effects.      COGINITIVE: The client/significant others will verbalize understanding of self-care measure to avoid/minimize side effects of the chemotherapy regime.    EVALUATION / COMMENT KEY:    V = Audiovisual/Video  S = Successfully meets outcome  N = Needs further instruction  NA = Not applicable to the patient  P = Previous knowledge  U = Unable to comprehend  * = See progress notes          PLAN OF CARE  INFORMATION TO BE DELIVERED / NURSING INTERVENTIONS DATE EVALUATION   Assessment of client/caregiver,         knowledge of cancer diagnosis,         and chemotherapy as a treatment. 1a. Evaluate patient/caregiver learning ability    b. Plan teaching sessions with patient/caregiver according to needs and present anxiety level/ability to learn.    c. Provide Chemotherapy Education Packet,        Mouth Care Protocol,         Specific Patient Education Sheets. 12/19/2024 S   Individual chemotherapy treatment          plan. 2a. Review of Chemotherapy Education handout from [employer]            12/19/2024   S   Knowledge Deficit & Self-Management of general side effects common to all chemotherapy:  Nausea/Vomiting  b.   Diarrhea  Mouth Care  Dental care  Constipation  Hair Loss  Potential for infection  Fatigue   3a. Reinforce that the majority of side effects from chemotherapy are reversible and are  controlled both in the hospital and at home        (blood counts recover, hair grows back).   b.  Refer to the following for reinforcement of         information post-treatment:  Mouth Care Protocol.  Bowel Protocol for constipation or diarrhea.  3.  Drug Specific Chemotherapy Information Sheets for each medication patient receiving.    12/19/2024     S     PLAN OF CARE  INFORMATION TO BE DELIVERED / NURSING INTERVENTIONS DATE EVALUATION   h. Potential for bleeding         i. Potential anemia/fatigue         j. Potential sunburn         k. Birth control measures  l. Safety measures post treatment 4.  Chemotherapy Home Care Instruction  and Safety Information Sheet.  A. patient/caregivers to thoroughly cook shellfish (shrimp, crab, etc) to decrease the chance of infection.    B.  Use sunscreen and protective clothing while in the sun.   12/19/2024      Knowledge deficit & Self Management of Drug Specific  Side Effects.    BLADDER EFFECTS        (Hemorrhagic Cystitis)                  Preventable with adequate hydration; occurs 2-3 days or more post treatment.   1.  Instruct patient to:  a.   Void at least every 2 hours; increase intake.  b.   DO NOT hold urine; go when urge is felt.  c.    Empty bladder at bedtime and on         awakening.  d.   Observe for color changes (red to tea           colored), amount and frequency changes.  e.   Notify oncologist of any abnormalities           in urine or voiding or if you cannot               drink adequate fluids.   12/19/2024   S   b.   CHANGES IN URINE        COLOR:      1.    Instruct patient:  a.   Most evident in first 2-3 voidings after           administration.  Lasts less than 24 hours.  If urine is discolored 2 or more days post- treatment, notify oncologist.      12/19/2024 S   c.    KIDNEY EFFECTS           (Nephrotoxicity)   1.  Instruct patient to:  a.   Drink 8-16 glasses of fluid/day the day   pre-treatment and 3-4 days post-treatment to maintain hydration; the best way to minimize kidney problems.  b.   Notify oncologist immediately if unable to drink fluids or if changes are noted in urinary elimination.     12/19/2024   S   PULMONARY TOXICITY    Instruct patient to report symptoms such as shortness of breath, chest pain, shallow breathing, or chest wall discomfort to physician.  Reinforce preventative measures used by the health care team.  Baseline and periodic PFT and chest x-ray.   12/19/2024   S     PLAN OF CARE INFORMATION TO BE DELIVERED / NURSING INTERVENTIONS DATE EVALUATION   NERVE & MUSCLE EFFECTS (neurotoxocity; neuropathy, possible visual/hearing changes)        Instruct patient to:    Report numbness or tingling of the hands/feet, loss of fine motor movement (buttoning shirt, tying shoelaces), or gait changes to your oncologist.  If numbness/tingling are present:  protect feet with shoes at all times.  Use gloves for washing dishes/gardening & potholders in kitchen.       12/19/2024   S   CARDIOTOXICITY  Decreased effectiveness of             cardiac function. Effective are                  cumulative and irreversible.                                    CARDIAC ARRYTHMIAS              4   Instruct:  Heart function may be tested before treatment and perdiocally during treatment.  Notify oncologist of irregular pulse, palpitations, shortness of breath, or swelling in lower extremities/feet.          Taxol and Taxotere can cause arrhythmias on infusion that resolve once infusion discontinued. Instruct nurse if any irregularity felt.    12/19/2024   S    EXTRAVASTION  Occurs when vesicants leak outside of vein and cause damage to the skin and underlying tissues.   Reinforce preventive measures used to avoid complications.  Fresh IV site or central line monitored continuously with vesicant IVP.  Continuous infusion via central line site and blood return monitored periodically around the clock.  Instruct to:  Notify nurse of any discomfort, burning, stinging, etc. at IV site during chemotherapy administration.  Notify oncologist of any redness, pain, or swelling at IV site after discharge from hospital.   12/19/2024   S   HYPERSENSITIVITY can happen with any medication.   Instruct patient:  Nurse is with them during the initial part of treatment and will be close by to monitor.  Pre-medication ordered by the oncologist must be taken on time. If doses are missed, treatment will need to be re-scheduled.  Skin redness, itching, or hives appearing after discharge should be reported to oncologist. 12/19/2024   S       PLAN OF CARE INFORMATION TO BE DELIVERED / NURSING INTERVENTIONS DATE EVALUATION   FLU-LIKE SYNDROME      Instruct patient symptoms are hard to prevent and may include fever, shaking chills, muscle and body aches.  Taking prescribed medications from physician if needed.  Adequate fluids are important.    Reinforce the need to call if temperature is         elevated to 100.4 or more  12/19/2024   S   HAND-FOOT SYNDROME  causes painful, symmetric swelling and redness of palms and soles                  Instruct patient to report any numbness or tingling in the hands or feet.  Explain prevention techniques, such as     Use heavy moisturizers to lessen skin dryness and itching, but to avoid if skin is cracked or broken  Bathe in tepid water, use non-perfumed soap, and wash gently. Baths with oatmeal or diluted baking soda may be soothing.  Avoid tight fitting shoes and repetitive actions, such as rubbing hands or applying pressure to hands/feet.  Review  measures to take should syndrome occur:  Cold compresses and elevation for          edema  Pain medications and other measures as ordered by oncologist.   4.   Syndrome resolves few weeks after therapy. 12/19/2024   S   5. DISCHARGE PLANNING /        EDUCATION 1.    Explain importance of compliance with follow- up  tests (CBC, CMP).  2.    Verify patient/caregiver know:  a.    Oncologists office phone number.  b.    Dates of follow-up appointments.  c.    Prescriptions given for nausea  3.   Review side effects to monitor and notify          oncologist about.  4.   Reinforce the need for patient and caregivers to:  a.    Review information given.  b.    Call oncologists office with questions          or symptoms  5.   Provide [employer]chure make referrals if needed for financial or .   12/19/2024   S     PROGRESS NOTES: I met with the patient  today for chemotherapy education. she will be starting treatment with Adriamycin/Cytoxan every 2 weeks followed by Taxol every 2 weeks . We discussed the mechanism of action, potential side effects of this treatment as well as ways she can manage them at home. Some of these side effects include but or not limited to fever, nausea, vomiting, decreased appetite, fatigue, weakness, cytopenias, myalgia/arthralgia, constipation, diarrhea, bleeding, headache, shortness of breath, nail changes, taste change, hair thinning/loss, mood disturbances, or edema. We also discussed dietary modifications she should make although this will be discussed in more detail with the dietician. she was provided with anti-emetic medication, a copy of all of the information we discussed today as well as our contact information. she will be provided a schedule on his first day of treatment. We will obtain labs on a weekly basis and the patient will follow-up with the physician for toxicity monitoring throughout treatment. All questions were answered and an informed  consent was obtained. she was reminded to certainly contact us sooner if needed.  Attached to the patients folder and discussed with the patient the 24 hour/ 7 days a week after hours telephone number for the physician.  Patient notified to call anytime 24/7 because their is a physician on call for any problems that may arise.  Patient also notified to report to [facility] ER if they can not get in touch with a physician after hours.  Discussed the five wishes booklet with the patient and their family.           Assessment/Plan   Triple negative breast cancer:  -echocardiogram 12 10/20/2024 showed EF of 55-60%  -see MD prior to cycle 1 for clearance        Medications Ordered:  Zofran 8mg 1 tab PO Q8h prn nausea  Compazine 10 mg PO Q4-6h prn nausea  Emla cream: Apply to port site 30min prior to treatment and cover with saran wrap  Claritin 10mg PO QD day of chemotherapy and for 5 days after Neulasta to help prevent bone pain  OTC NSAIDS  Take 2 PO QD day of and for 5 days after Neulasta to help prevent bone pain  OTC Imodium as directed  OTC MiraLax 17 g daily for constipation  Decadron 8 mg p.o. on days 2 through 4 of each cycle of chemo  Zyprexa 5 mg p.o. q.h.s. on days 1 through 4 of each cycle of chemo    * please notify clinic or report to the emergency department for fever of 100.4 grade  * avoid tobacco and alcohol use  * maintain a healthy diet and good oral hydration  * good blood pressure and blood sugar control is important.  Please keep all followups with your primary care provider  *good hand hygiene  * please call clinic with any questions or concerns  * please take all medications as directed      Patient is in agreement with the proposed treatment plan. All questions were answered to the patient's satisfaction. Patient knows to call clinic for any new or worsening symptoms and if anything is needed before the next clinic visit.    Kristi Mena NP-C  Hematology & Medical Oncology     Collaborating  physician, Dr. Khoobehi.    50 minutes of total time spent on the encounter, which includes face-to-face time and non face-to-face time preparing to see the patient (e.g., review of tests), obtaining and/or reviewing separately obtain history, documenting clinical information in the electronic or other health record, independently interpreting results (not separately reported) and communicating results to the patient/family/caregiver or care coordination (not separately reported).  Electronically signed by: Kristi EDWARDS

## 2024-12-20 DIAGNOSIS — Z17.1 MALIGNANT NEOPLASM OF UPPER-INNER QUADRANT OF RIGHT BREAST IN FEMALE, ESTROGEN RECEPTOR NEGATIVE: Primary | ICD-10-CM

## 2024-12-20 DIAGNOSIS — C50.211 MALIGNANT NEOPLASM OF UPPER-INNER QUADRANT OF RIGHT BREAST IN FEMALE, ESTROGEN RECEPTOR NEGATIVE: Primary | ICD-10-CM

## 2024-12-30 ENCOUNTER — TELEPHONE (OUTPATIENT)
Facility: CLINIC | Age: 74
End: 2024-12-30
Payer: MEDICARE

## 2024-12-30 NOTE — NURSING
Pt scheduled for chemo starting on 1/16/25. She previously declined an appointment for December due to travel plans, and was offered an appointment for 1/9/25, but she declined and requested 1/16/25 instead.

## 2024-12-31 ENCOUNTER — HOSPITAL ENCOUNTER (OUTPATIENT)
Dept: RADIOLOGY | Facility: HOSPITAL | Age: 74
Discharge: HOME | End: 2024-12-31
Attending: INTERNAL MEDICINE
Payer: MEDICARE

## 2024-12-31 DIAGNOSIS — C50.919 INVASIVE CARCINOMA OF BREAST: ICD-10-CM

## 2024-12-31 PROCEDURE — 77049 MRI BREAST C-+ W/CAD BI: CPT | Mod: 26,,, | Performed by: RADIOLOGY

## 2024-12-31 PROCEDURE — A9585 GADOBUTROL INJECTION: HCPCS | Mod: PO | Performed by: INTERNAL MEDICINE

## 2024-12-31 PROCEDURE — 25500020 PHARM REV CODE 255: Mod: PO | Performed by: INTERNAL MEDICINE

## 2024-12-31 PROCEDURE — C8937 CAD BREAST MRI: HCPCS | Mod: TC,PO

## 2024-12-31 RX ORDER — GADOBUTROL 604.72 MG/ML
10 INJECTION INTRAVENOUS
Status: COMPLETED | OUTPATIENT
Start: 2024-12-31 | End: 2024-12-31

## 2024-12-31 RX ADMIN — GADOBUTROL 10 ML: 604.72 INJECTION INTRAVENOUS at 09:12

## 2025-01-06 ENCOUNTER — TELEPHONE (OUTPATIENT)
Dept: HEMATOLOGY/ONCOLOGY | Facility: CLINIC | Age: 75
End: 2025-01-06
Payer: MEDICARE

## 2025-01-06 NOTE — TELEPHONE ENCOUNTER
Spoke with Lisa, nurse reviewer. She states that CPT code on test does not require auth and [facility] is in network for pt.

## 2025-01-06 NOTE — TELEPHONE ENCOUNTER
----- Message from Lalita sent at 1/6/2025 10:48 AM CST -----  Lisa with [email or web address] calling to let us know that request for [place] was submitted 12/31 can't be reviewed due to service retrodate 12/10.    Does not require auth because [facility] is in network     [phone]

## 2025-01-07 ENCOUNTER — PATIENT MESSAGE (OUTPATIENT)
Facility: CLINIC | Age: 75
End: 2025-01-07
Payer: MEDICARE

## 2025-01-07 ENCOUNTER — TELEPHONE (OUTPATIENT)
Dept: HEMATOLOGY/ONCOLOGY | Facility: CLINIC | Age: 75
End: 2025-01-07
Payer: MEDICARE

## 2025-01-07 NOTE — TELEPHONE ENCOUNTER
Responded to pt's portal message regarding the same question.      ----- Message from Gurjit sent at 1/7/2025  9:35 AM CST -----  Regarding: Advice  Contact: [phone]  Patient is calling to speak with the doctor and nurse about the flu and covid shot she would like to know if she can get it before her chemo on the 17. Please contact pt

## 2025-01-14 ENCOUNTER — TELEPHONE (OUTPATIENT)
Facility: CLINIC | Age: 75
End: 2025-01-14
Payer: MEDICARE

## 2025-01-14 DIAGNOSIS — Z17.1 MALIGNANT NEOPLASM OF UPPER-INNER QUADRANT OF RIGHT BREAST IN FEMALE, ESTROGEN RECEPTOR NEGATIVE: Primary | ICD-10-CM

## 2025-01-14 DIAGNOSIS — C50.211 MALIGNANT NEOPLASM OF UPPER-INNER QUADRANT OF RIGHT BREAST IN FEMALE, ESTROGEN RECEPTOR NEGATIVE: Primary | ICD-10-CM

## 2025-01-14 NOTE — NURSING
Received call from pt stating she needs to do her lab work at [facility]. I have placed orders for pt to have these drawn at that facility.

## 2025-01-15 ENCOUNTER — OFFICE VISIT (OUTPATIENT)
Dept: HEMATOLOGY/ONCOLOGY | Facility: CLINIC | Age: 75
End: 2025-01-15
Payer: MEDICARE

## 2025-01-15 ENCOUNTER — TELEPHONE (OUTPATIENT)
Dept: HEMATOLOGY/ONCOLOGY | Facility: CLINIC | Age: 75
End: 2025-01-15
Payer: MEDICARE

## 2025-01-15 VITALS
HEIGHT: 65 IN | DIASTOLIC BLOOD PRESSURE: 68 MMHG | OXYGEN SATURATION: 98 % | WEIGHT: 230.81 LBS | TEMPERATURE: 97 F | HEART RATE: 60 BPM | SYSTOLIC BLOOD PRESSURE: 151 MMHG | BODY MASS INDEX: 38.45 KG/M2 | RESPIRATION RATE: 18 BRPM

## 2025-01-15 DIAGNOSIS — E66.01 SEVERE OBESITY (BMI 35.0-39.9) WITH COMORBIDITY: ICD-10-CM

## 2025-01-15 DIAGNOSIS — C50.211 MALIGNANT NEOPLASM OF UPPER-INNER QUADRANT OF RIGHT BREAST IN FEMALE, ESTROGEN RECEPTOR NEGATIVE: Primary | ICD-10-CM

## 2025-01-15 DIAGNOSIS — Z17.1 MALIGNANT NEOPLASM OF UPPER-INNER QUADRANT OF RIGHT BREAST IN FEMALE, ESTROGEN RECEPTOR NEGATIVE: Primary | ICD-10-CM

## 2025-01-15 PROCEDURE — 1159F MED LIST DOCD IN RCRD: CPT | Mod: CPTII,S$GLB,, | Performed by: INTERNAL MEDICINE

## 2025-01-15 PROCEDURE — G2211 COMPLEX E/M VISIT ADD ON: HCPCS | Mod: S$GLB,,, | Performed by: INTERNAL MEDICINE

## 2025-01-15 PROCEDURE — 1126F AMNT PAIN NOTED NONE PRSNT: CPT | Mod: CPTII,S$GLB,, | Performed by: INTERNAL MEDICINE

## 2025-01-15 PROCEDURE — 99999 PR PBB SHADOW E&M-EST. PATIENT-LVL III: CPT | Mod: PBBFAC,,, | Performed by: INTERNAL MEDICINE

## 2025-01-15 PROCEDURE — 1101F PT FALLS ASSESS-DOCD LE1/YR: CPT | Mod: CPTII,S$GLB,, | Performed by: INTERNAL MEDICINE

## 2025-01-15 PROCEDURE — 3077F SYST BP >= 140 MM HG: CPT | Mod: CPTII,S$GLB,, | Performed by: INTERNAL MEDICINE

## 2025-01-15 PROCEDURE — 3078F DIAST BP <80 MM HG: CPT | Mod: CPTII,S$GLB,, | Performed by: INTERNAL MEDICINE

## 2025-01-15 PROCEDURE — 99215 OFFICE O/P EST HI 40 MIN: CPT | Mod: S$GLB,,, | Performed by: INTERNAL MEDICINE

## 2025-01-15 PROCEDURE — 3008F BODY MASS INDEX DOCD: CPT | Mod: CPTII,S$GLB,, | Performed by: INTERNAL MEDICINE

## 2025-01-15 PROCEDURE — 3288F FALL RISK ASSESSMENT DOCD: CPT | Mod: CPTII,S$GLB,, | Performed by: INTERNAL MEDICINE

## 2025-01-15 RX ORDER — SODIUM CHLORIDE 0.9 % (FLUSH) 0.9 %
10 SYRINGE (ML) INJECTION
Status: CANCELLED | OUTPATIENT
Start: 2025-01-15

## 2025-01-15 RX ORDER — HEPARIN 100 UNIT/ML
500 SYRINGE INTRAVENOUS
Status: CANCELLED | OUTPATIENT
Start: 2025-01-15

## 2025-01-15 RX ORDER — DOXORUBICIN HYDROCHLORIDE 2 MG/ML
60 INJECTION, SOLUTION INTRAVENOUS
Status: CANCELLED | OUTPATIENT
Start: 2025-01-15

## 2025-01-15 NOTE — TELEPHONE ENCOUNTER
Told pt I called by mistake         ----- Message from Lalita sent at 1/15/2025  9:19 AM CST -----  Please call pt back, she was on the phone with someone and her phone      [phone]

## 2025-01-15 NOTE — PROGRESS NOTES
Service Date:  1/15/25    Chief Complaint:  Breast cancer     Essence Herrera is a 74 y.o. female here with breast cancer.  Ready to start chemotherapy.  Has had her chemotherapy school.  Denies any new symptoms since seeing me.  Recovering from her biopsy.  PET scan and MRI reviewed with her today.    Review of Systems   Constitutional: Negative.  Negative for appetite change and unexpected weight change.   HENT: Negative.  Negative for mouth sores.    Eyes: Negative.  Negative for visual disturbance.   Respiratory: Negative.  Negative for cough and shortness of breath.    Cardiovascular: Negative.  Negative for chest pain.   Gastrointestinal: Negative.  Negative for abdominal pain and diarrhea.   Endocrine: Negative.    Genitourinary: Negative.  Negative for frequency.   Musculoskeletal: Negative.  Negative for back pain.   Integumentary:  Negative for rash. Negative.   Neurological: Negative.  Negative for headaches.   Hematological: Negative.  Negative for adenopathy.   Psychiatric/Behavioral: Negative.  The patient is not nervous/anxious.         Current Outpatient Medications   Medication Instructions    atorvastatin (LIPITOR) 20 mg, Daily    dexAMETHasone (DECADRON) 8 mg, Oral, See admin instructions, Take 8 mg by mouth daily on days 2, 3 and 4 of chemotherapy cycle.    LIDOcaine-prilocaine (EMLA) cream Topical (Top), As needed (PRN)    MOUNJARO 2.5 mg, Weekly    OLANZapine (ZYPREXA) 5 mg, Oral, See admin instructions, Take 1 tablet by mouth nightly on days 1-4 of each chemotherapy cycle.    olmesartan-hydrochlorothiazide (BENICAR HCT) 20-12.5 mg per tablet 1 tablet, Daily    ondansetron (ZOFRAN-ODT) 8 mg, Oral, Every 8 hours PRN    prochlorperazine (COMPAZINE) 10 mg, Oral, Every 6 hours PRN    traZODone (DESYREL) 50 mg, Oral, Nightly        Past Medical History:   Diagnosis Date    Hyperlipidemia     Hypertension         Past Surgical History:   Procedure Laterality Date    BUNIONECTOMY Bilateral 1996     "CARPAL TUNNEL RELEASE Bilateral 1998    DILATION AND CURETTAGE OF UTERUS      HYSTERECTOMY      TVH with conservation of the ovaries    INSERTION OF TUNNELED CENTRAL VENOUS CATHETER (CVC) WITH SUBCUTANEOUS PORT Right 12/16/2024    Procedure: [id number];  Surgeon: Juaquin Sanchez Jr., MD;  Location: [facility];  Service: General;  Laterality: Right;    ROTATOR CUFF REPAIR Right 2005    TUBAL LIGATION          Family History   Problem Relation Name Age of Onset    Breast cancer Maternal Grandfather      Breast cancer Maternal Aunt         Social History     Tobacco Use    Smoking status: Never    Smokeless tobacco: Never   Substance Use Topics    Alcohol use: Not Currently    Drug use: Never         Vitals:    01/15/25 1046   BP: (!) 151/68   Pulse: 60   Resp: 18   Temp: 97.2 °F (36.2 °C)        Physical Exam:  BP (!) 151/68 (BP Location: Left arm, Patient Position: Sitting)   Pulse 60   Temp 97.2 °F (36.2 °C) (Temporal)   Resp 18   Ht 5' 5" (1.651 m)   Wt 104.7 kg (230 lb 13.2 oz)   SpO2 98%   BMI 38.41 kg/m²     Physical Exam  Constitutional:       Appearance: Normal appearance.   HENT:      Head: Normocephalic and atraumatic.      Nose: Nose normal.      Mouth/Throat:      Mouth: Mucous membranes are moist.      Pharynx: Oropharynx is clear.   Eyes:      Conjunctiva/sclera: Conjunctivae normal.   Cardiovascular:      Rate and Rhythm: Normal rate and regular rhythm.      Heart sounds: Normal heart sounds.   Pulmonary:      Effort: Pulmonary effort is normal.      Breath sounds: Normal breath sounds.   Abdominal:      General: Abdomen is flat. Bowel sounds are normal.      Palpations: Abdomen is soft.   Musculoskeletal:         General: Normal range of motion.      Cervical back: Normal range of motion and neck supple.   Skin:     General: Skin is warm and dry.   Neurological:      General: No focal deficit present.      Mental Status: She is alert and oriented to person, place, and time. Mental " status is at baseline.   Psychiatric:         Mood and Affect: Mood normal.          Labs:  Lab Results   Component Value Date    WBC 5.13 12/16/2024    RBC 4.26 12/16/2024    HGB 12.7 12/16/2024    HCT 38.2 12/16/2024    MCV 90 12/16/2024    MCH 29.8 12/16/2024    MCHC 33.2 12/16/2024    RDW 13.7 12/16/2024     12/16/2024    MPV 11.6 12/16/2024    GRAN 2.3 12/16/2024    GRAN 44.3 12/16/2024    LYMPH 2.4 12/16/2024    LYMPH 47.0 12/16/2024    MONO 0.3 12/16/2024    MONO 6.2 12/16/2024    EOS 0.1 12/16/2024    BASO 0.02 12/16/2024    EOSINOPHIL 1.9 12/16/2024    BASOPHIL 0.4 12/16/2024     Sodium   Date Value Ref Range Status   12/12/2024 137 136 - 145 mmol/L Final     Potassium   Date Value Ref Range Status   12/12/2024 4.1 3.5 - 5.1 mmol/L Final     Chloride   Date Value Ref Range Status   12/12/2024 106 95 - 110 mmol/L Final     CO2   Date Value Ref Range Status   12/12/2024 25 23 - 29 mmol/L Final     Glucose   Date Value Ref Range Status   12/12/2024 99 70 - 110 mg/dL Final     BUN   Date Value Ref Range Status   12/12/2024 14 8 - 23 mg/dL Final     Creatinine   Date Value Ref Range Status   12/12/2024 0.9 0.5 - 1.4 mg/dL Final     Calcium   Date Value Ref Range Status   12/12/2024 9.1 8.7 - 10.5 mg/dL Final     Total Protein   Date Value Ref Range Status   12/12/2024 7.0 6.0 - 8.4 g/dL Final     Albumin   Date Value Ref Range Status   12/12/2024 3.7 3.5 - 5.2 g/dL Final     Total Bilirubin   Date Value Ref Range Status   12/12/2024 0.7 0.1 - 1.0 mg/dL Final     Comment:     For infants and newborns, interpretation of results should be based  on gestational age, weight and in agreement with clinical  observations.    Premature Infant recommended reference ranges:  Up to 24 hours.............<8.0 mg/dL  Up to 48 hours............<12.0 mg/dL  3-5 days..................<15.0 mg/dL  6-29 days.................<15.0 mg/dL       Alkaline Phosphatase   Date Value Ref Range Status   12/12/2024 88 55 - 135 U/L  Final     AST   Date Value Ref Range Status   12/12/2024 13 10 - 40 U/L Final     ALT   Date Value Ref Range Status   12/12/2024 7 (L) 10 - 44 U/L Final     Anion Gap   Date Value Ref Range Status   12/12/2024 6 (L) 8 - 16 mmol/L Final     eGFR if    Date Value Ref Range Status   03/02/2021 >60.0 >60 mL/min/1.73 m^2 Final     eGFR if non    Date Value Ref Range Status   03/02/2021 >60.0 >60 mL/min/1.73 m^2 Final     Comment:     Calculation used to obtain the estimated glomerular filtration  rate (eGFR) is the CKD-EPI equation.          A/P:    Malignant neoplasm of the right breast   -triple negative   -cT1c N0 M0   -will treat with neoadjuvant ACT   -I went over her medications again today with her.  She understands how to take everything including scheduled and p.r.n. medications.    -she is okay for cycle 1 tomorrow   -she is going on a cruise next week so will not be able to do her toxicity check but did assure me that she will see a physician there if she is feeling bad   -return to clinic in 2 weeks for cycle 2 with labs      Aurash Khoobehi, MD  Hematology and Oncology    Visit today included increased complexity associated with the care of the episodic problem breast cancer addressed and managing the longitudinal care of the patient due to the serious and/or complex managed problem(s).

## 2025-01-16 ENCOUNTER — TELEPHONE (OUTPATIENT)
Dept: HEMATOLOGY/ONCOLOGY | Facility: CLINIC | Age: 75
End: 2025-01-16
Payer: MEDICARE

## 2025-01-16 ENCOUNTER — INFUSION (OUTPATIENT)
Dept: INFUSION THERAPY | Facility: HOSPITAL | Age: 75
End: 2025-01-16
Attending: INTERNAL MEDICINE
Payer: MEDICARE

## 2025-01-16 ENCOUNTER — SOCIAL WORK (OUTPATIENT)
Dept: HEMATOLOGY/ONCOLOGY | Facility: CLINIC | Age: 75
End: 2025-01-16
Payer: MEDICARE

## 2025-01-16 VITALS
HEART RATE: 63 BPM | OXYGEN SATURATION: 98 % | DIASTOLIC BLOOD PRESSURE: 64 MMHG | TEMPERATURE: 97 F | RESPIRATION RATE: 16 BRPM | BODY MASS INDEX: 38.27 KG/M2 | HEIGHT: 65 IN | WEIGHT: 229.69 LBS | SYSTOLIC BLOOD PRESSURE: 110 MMHG

## 2025-01-16 DIAGNOSIS — C50.211 MALIGNANT NEOPLASM OF UPPER-INNER QUADRANT OF RIGHT BREAST IN FEMALE, ESTROGEN RECEPTOR NEGATIVE: Primary | ICD-10-CM

## 2025-01-16 DIAGNOSIS — Z17.1 MALIGNANT NEOPLASM OF UPPER-INNER QUADRANT OF RIGHT BREAST IN FEMALE, ESTROGEN RECEPTOR NEGATIVE: Primary | ICD-10-CM

## 2025-01-16 LAB
ALBUMIN SERPL-MCNC: 4 G/DL (ref 3.8–4.8)
ALP SERPL-CCNC: 115 IU/L (ref 44–121)
ALT SERPL-CCNC: 8 IU/L (ref 0–32)
AST SERPL-CCNC: 12 IU/L (ref 0–40)
BASOPHILS # BLD AUTO: 0 X10E3/UL (ref 0–0.2)
BASOPHILS NFR BLD AUTO: 1 %
BILIRUB SERPL-MCNC: 0.4 MG/DL (ref 0–1.2)
BUN SERPL-MCNC: 11 MG/DL (ref 8–27)
BUN/CREAT SERPL: 13 (ref 12–28)
CALCIUM SERPL-MCNC: 9.6 MG/DL (ref 8.7–10.3)
CHLORIDE SERPL-SCNC: 103 MMOL/L (ref 96–106)
CO2 SERPL-SCNC: 23 MMOL/L (ref 20–29)
CREAT SERPL-MCNC: 0.87 MG/DL (ref 0.57–1)
EOSINOPHIL # BLD AUTO: 0.1 X10E3/UL (ref 0–0.4)
EOSINOPHIL NFR BLD AUTO: 2 %
ERYTHROCYTE [DISTWIDTH] IN BLOOD BY AUTOMATED COUNT: 13.4 % (ref 11.7–15.4)
EST. GFR  (NO RACE VARIABLE): 70 ML/MIN/1.73
GLOBULIN SER CALC-MCNC: 3 G/DL (ref 1.5–4.5)
GLUCOSE SERPL-MCNC: 109 MG/DL (ref 70–99)
HCT VFR BLD AUTO: 39.6 % (ref 34–46.6)
HGB BLD-MCNC: 13 G/DL (ref 11.1–15.9)
IMM GRANULOCYTES # BLD AUTO: 0 X10E3/UL (ref 0–0.1)
IMM GRANULOCYTES NFR BLD AUTO: 0 %
LYMPHOCYTES # BLD AUTO: 1.9 X10E3/UL (ref 0.7–3.1)
LYMPHOCYTES NFR BLD AUTO: 40 %
MCH RBC QN AUTO: 30.3 PG (ref 26.6–33)
MCHC RBC AUTO-ENTMCNC: 32.8 G/DL (ref 31.5–35.7)
MCV RBC AUTO: 92 FL (ref 79–97)
MONOCYTES # BLD AUTO: 0.2 X10E3/UL (ref 0.1–0.9)
MONOCYTES NFR BLD AUTO: 5 %
NEUTROPHILS # BLD AUTO: 2.5 X10E3/UL (ref 1.4–7)
NEUTROPHILS NFR BLD AUTO: 52 %
PLATELET # BLD AUTO: 225 X10E3/UL (ref 150–450)
POTASSIUM SERPL-SCNC: 4.4 MMOL/L (ref 3.5–5.2)
PROT SERPL-MCNC: 7 G/DL (ref 6–8.5)
RBC # BLD AUTO: 4.29 X10E6/UL (ref 3.77–5.28)
SODIUM SERPL-SCNC: 139 MMOL/L (ref 134–144)
WBC # BLD AUTO: 4.7 X10E3/UL (ref 3.4–10.8)

## 2025-01-16 PROCEDURE — 25000003 PHARM REV CODE 250: Performed by: INTERNAL MEDICINE

## 2025-01-16 PROCEDURE — A4216 STERILE WATER/SALINE, 10 ML: HCPCS | Performed by: INTERNAL MEDICINE

## 2025-01-16 PROCEDURE — 63600175 PHARM REV CODE 636 W HCPCS: Performed by: INTERNAL MEDICINE

## 2025-01-16 PROCEDURE — 96413 CHEMO IV INFUSION 1 HR: CPT

## 2025-01-16 PROCEDURE — 96367 TX/PROPH/DG ADDL SEQ IV INF: CPT

## 2025-01-16 PROCEDURE — 96411 CHEMO IV PUSH ADDL DRUG: CPT

## 2025-01-16 RX ORDER — DOXORUBICIN HYDROCHLORIDE 2 MG/ML
60 INJECTION, SOLUTION INTRAVENOUS
Status: COMPLETED | OUTPATIENT
Start: 2025-01-16 | End: 2025-01-16

## 2025-01-16 RX ORDER — HEPARIN 100 UNIT/ML
500 SYRINGE INTRAVENOUS
Status: DISCONTINUED | OUTPATIENT
Start: 2025-01-16 | End: 2025-01-16 | Stop reason: HOSPADM

## 2025-01-16 RX ORDER — SODIUM CHLORIDE 0.9 % (FLUSH) 0.9 %
10 SYRINGE (ML) INJECTION
Status: DISCONTINUED | OUTPATIENT
Start: 2025-01-16 | End: 2025-01-16 | Stop reason: HOSPADM

## 2025-01-16 RX ADMIN — FOSAPREPITANT 150 MG: 150 INJECTION, POWDER, LYOPHILIZED, FOR SOLUTION INTRAVENOUS at 11:01

## 2025-01-16 RX ADMIN — SODIUM CHLORIDE 0.25 MG: 9 INJECTION, SOLUTION INTRAVENOUS at 11:01

## 2025-01-16 RX ADMIN — HEPARIN 500 UNITS: 100 SYRINGE at 01:01

## 2025-01-16 RX ADMIN — SODIUM CHLORIDE: 900 INJECTION, SOLUTION INTRAVENOUS at 11:01

## 2025-01-16 RX ADMIN — CYCLOPHOSPHAMIDE 1310 MG: 1000 INJECTION, SOLUTION, CONCENTRATE INTRAVENOUS at 12:01

## 2025-01-16 RX ADMIN — Medication 10 ML: at 01:01

## 2025-01-16 RX ADMIN — DOXORUBICIN HYDROCHLORIDE 132 MG: 2 INJECTION, SOLUTION INTRAVENOUS at 11:01

## 2025-01-16 NOTE — PLAN OF CARE
Problem: Fatigue  Goal: Improved Activity Tolerance  Outcome: Progressing  Intervention: Promote Improved Energy  Flowsheets (Taken 1/16/2025 1100)  Fatigue Management:   fatigue-related activity identified   frequent rest breaks encouraged   paced activity encouraged  Sleep/Rest Enhancement:   regular sleep/rest pattern promoted   relaxation techniques promoted  Activity Management: Ambulated -L4  Environmental Support: rest periods encouraged

## 2025-01-16 NOTE — NURSING
Educated pt and family of monitoring pt over the next few days for fever, excess fatugue, N/V or severe diarrhea or constipation. Pt is going on a cruise this weekend and I educated pt on wearing a mask and washing hands frequently and to seek on board doctor if needed. Pt and family stated understanding.

## 2025-01-16 NOTE — NURSING
Per Khoobehi, MD ok to treat today without CMP results from [facility]. MD office to scan in results when faxed from [facility]. Educated pt to get labs done the Mondays prior to treatments to ensure results prior to appt time. Pt stated understanding.

## 2025-01-16 NOTE — PROGRESS NOTES
Essence Herrera is a 74 year old diagnosed with breast cancer.  I met with patient during her chemo infusion to complete new patient orientation and to complete the NCCN Distress Screening; patient indicated a rating of 0.  Patient denied needing psychosocial support at this time.  I provided patient with the [facility] community events flyer and my contact information in the event supportive services arise in the future.

## 2025-01-16 NOTE — TELEPHONE ENCOUNTER
Spoke to Serena at [facility].  Stated taht the CMP was drawn on 1/14/2025.  Serena stated that she will fax the results over when they are completed.   Gave the fax # of [phone]

## 2025-01-17 ENCOUNTER — INFUSION (OUTPATIENT)
Dept: INFUSION THERAPY | Facility: HOSPITAL | Age: 75
End: 2025-01-17
Attending: INTERNAL MEDICINE
Payer: MEDICARE

## 2025-01-17 VITALS
RESPIRATION RATE: 17 BRPM | SYSTOLIC BLOOD PRESSURE: 136 MMHG | BODY MASS INDEX: 38.87 KG/M2 | TEMPERATURE: 98 F | OXYGEN SATURATION: 96 % | WEIGHT: 233.31 LBS | HEART RATE: 62 BPM | DIASTOLIC BLOOD PRESSURE: 69 MMHG | HEIGHT: 65 IN

## 2025-01-17 DIAGNOSIS — Z17.1 MALIGNANT NEOPLASM OF UPPER-INNER QUADRANT OF RIGHT BREAST IN FEMALE, ESTROGEN RECEPTOR NEGATIVE: Primary | ICD-10-CM

## 2025-01-17 DIAGNOSIS — C50.211 MALIGNANT NEOPLASM OF UPPER-INNER QUADRANT OF RIGHT BREAST IN FEMALE, ESTROGEN RECEPTOR NEGATIVE: Primary | ICD-10-CM

## 2025-01-17 PROCEDURE — 96372 THER/PROPH/DIAG INJ SC/IM: CPT

## 2025-01-17 PROCEDURE — 63600175 PHARM REV CODE 636 W HCPCS: Mod: JZ,TB | Performed by: INTERNAL MEDICINE

## 2025-01-17 RX ADMIN — PEGFILGRASTIM 6 MG: 6 INJECTION SUBCUTANEOUS at 02:01

## 2025-01-17 NOTE — PLAN OF CARE
Problem: Fatigue  Goal: Improved Activity Tolerance  Outcome: Progressing  Intervention: Promote Improved Energy  Flowsheets (Taken 1/17/2025 1411)  Fatigue Management: frequent rest breaks encouraged  Sleep/Rest Enhancement: regular sleep/rest pattern promoted  Activity Management: Ambulated -L4  Environmental Support: calm environment promoted

## 2025-01-29 ENCOUNTER — OFFICE VISIT (OUTPATIENT)
Dept: HEMATOLOGY/ONCOLOGY | Facility: CLINIC | Age: 75
End: 2025-01-29
Payer: MEDICARE

## 2025-01-29 VITALS
BODY MASS INDEX: 38.09 KG/M2 | TEMPERATURE: 97 F | WEIGHT: 228.63 LBS | SYSTOLIC BLOOD PRESSURE: 138 MMHG | HEART RATE: 60 BPM | RESPIRATION RATE: 18 BRPM | OXYGEN SATURATION: 95 % | HEIGHT: 65 IN | DIASTOLIC BLOOD PRESSURE: 64 MMHG

## 2025-01-29 DIAGNOSIS — K12.30 MUCOSITIS: ICD-10-CM

## 2025-01-29 DIAGNOSIS — D64.81 ANTINEOPLASTIC CHEMOTHERAPY INDUCED ANEMIA: ICD-10-CM

## 2025-01-29 DIAGNOSIS — T45.1X5A ANTINEOPLASTIC CHEMOTHERAPY INDUCED ANEMIA: ICD-10-CM

## 2025-01-29 DIAGNOSIS — C50.211 MALIGNANT NEOPLASM OF UPPER-INNER QUADRANT OF RIGHT BREAST IN FEMALE, ESTROGEN RECEPTOR NEGATIVE: Primary | ICD-10-CM

## 2025-01-29 DIAGNOSIS — Z17.1 MALIGNANT NEOPLASM OF UPPER-INNER QUADRANT OF RIGHT BREAST IN FEMALE, ESTROGEN RECEPTOR NEGATIVE: Primary | ICD-10-CM

## 2025-01-29 PROCEDURE — 3075F SYST BP GE 130 - 139MM HG: CPT | Mod: CPTII,S$GLB,, | Performed by: INTERNAL MEDICINE

## 2025-01-29 PROCEDURE — 99999 PR PBB SHADOW E&M-EST. PATIENT-LVL III: CPT | Mod: PBBFAC,,, | Performed by: INTERNAL MEDICINE

## 2025-01-29 PROCEDURE — 1125F AMNT PAIN NOTED PAIN PRSNT: CPT | Mod: CPTII,S$GLB,, | Performed by: INTERNAL MEDICINE

## 2025-01-29 PROCEDURE — 1159F MED LIST DOCD IN RCRD: CPT | Mod: CPTII,S$GLB,, | Performed by: INTERNAL MEDICINE

## 2025-01-29 PROCEDURE — 3078F DIAST BP <80 MM HG: CPT | Mod: CPTII,S$GLB,, | Performed by: INTERNAL MEDICINE

## 2025-01-29 PROCEDURE — G2211 COMPLEX E/M VISIT ADD ON: HCPCS | Mod: S$GLB,,, | Performed by: INTERNAL MEDICINE

## 2025-01-29 PROCEDURE — 3288F FALL RISK ASSESSMENT DOCD: CPT | Mod: CPTII,S$GLB,, | Performed by: INTERNAL MEDICINE

## 2025-01-29 PROCEDURE — 3008F BODY MASS INDEX DOCD: CPT | Mod: CPTII,S$GLB,, | Performed by: INTERNAL MEDICINE

## 2025-01-29 PROCEDURE — 1101F PT FALLS ASSESS-DOCD LE1/YR: CPT | Mod: CPTII,S$GLB,, | Performed by: INTERNAL MEDICINE

## 2025-01-29 PROCEDURE — 99215 OFFICE O/P EST HI 40 MIN: CPT | Mod: S$GLB,,, | Performed by: INTERNAL MEDICINE

## 2025-01-29 RX ORDER — HEPARIN 100 UNIT/ML
500 SYRINGE INTRAVENOUS
Status: CANCELLED | OUTPATIENT
Start: 2025-01-30

## 2025-01-29 RX ORDER — SODIUM CHLORIDE 0.9 % (FLUSH) 0.9 %
10 SYRINGE (ML) INJECTION
Status: CANCELLED | OUTPATIENT
Start: 2025-01-30

## 2025-01-29 RX ORDER — DOXORUBICIN HYDROCHLORIDE 2 MG/ML
60 INJECTION, SOLUTION INTRAVENOUS
Status: CANCELLED | OUTPATIENT
Start: 2025-01-30

## 2025-01-29 NOTE — PROGRESS NOTES
Service Date:  1/29/25    Chief Complaint:  Breast cancer     Essence Herrera is a 74 y.o. female here with breast cancer.  Doing well.  Tolerated cycle 1 mostly well.  Had some mucositis after the chemo but getting better.  Using her mouthwash.  No other complaints.    Review of Systems   Constitutional: Negative.  Negative for appetite change and unexpected weight change.   HENT: Negative.  Negative for mouth sores.    Eyes: Negative.  Negative for visual disturbance.   Respiratory: Negative.  Negative for cough and shortness of breath.    Cardiovascular: Negative.  Negative for chest pain.   Gastrointestinal: Negative.  Negative for abdominal pain and diarrhea.   Endocrine: Negative.    Genitourinary: Negative.  Negative for frequency.   Musculoskeletal: Negative.  Negative for back pain.   Integumentary:  Negative for rash. Negative.   Neurological: Negative.  Negative for headaches.   Hematological: Negative.  Negative for adenopathy.   Psychiatric/Behavioral: Negative.  The patient is not nervous/anxious.         Current Outpatient Medications   Medication Instructions    atorvastatin (LIPITOR) 20 mg, Daily    dexAMETHasone (DECADRON) 8 mg, Oral, See admin instructions, Take 8 mg by mouth daily on days 2, 3 and 4 of chemotherapy cycle.    LIDOcaine-prilocaine (EMLA) cream Topical (Top), As needed (PRN)    MOUNJARO 2.5 mg, Weekly    OLANZapine (ZYPREXA) 5 mg, Oral, See admin instructions, Take 1 tablet by mouth nightly on days 1-4 of each chemotherapy cycle.    olmesartan-hydrochlorothiazide (BENICAR HCT) 20-12.5 mg per tablet 1 tablet, Daily    ondansetron (ZOFRAN-ODT) 8 mg, Oral, Every 8 hours PRN    prochlorperazine (COMPAZINE) 10 mg, Oral, Every 6 hours PRN    traZODone (DESYREL) 50 mg, Oral, Nightly        Past Medical History:   Diagnosis Date    Hyperlipidemia     Hypertension         Past Surgical History:   Procedure Laterality Date    BUNIONECTOMY Bilateral 1996    CARPAL TUNNEL RELEASE Bilateral  "1998    DILATION AND CURETTAGE OF UTERUS      HYSTERECTOMY      TVH with conservation of the ovaries    INSERTION OF TUNNELED CENTRAL VENOUS CATHETER (CVC) WITH SUBCUTANEOUS PORT Right 12/16/2024    Procedure: [id number];  Surgeon: Juaquin Sanchez Jr., MD;  Location: [facility];  Service: General;  Laterality: Right;    ROTATOR CUFF REPAIR Right 2005    TUBAL LIGATION          Family History   Problem Relation Name Age of Onset    Breast cancer Maternal Grandfather      Breast cancer Maternal Aunt         Social History     Tobacco Use    Smoking status: Never    Smokeless tobacco: Never   Substance Use Topics    Alcohol use: Not Currently    Drug use: Never         Vitals:    01/29/25 1023   BP: 138/64   Pulse: 60   Resp: 18   Temp: 97.2 °F (36.2 °C)        Physical Exam:  /64 (BP Location: Right arm, Patient Position: Sitting)   Pulse 60   Temp 97.2 °F (36.2 °C) (Temporal)   Resp 18   Ht 5' 5" (1.651 m)   Wt 103.7 kg (228 lb 9.9 oz)   SpO2 95%   BMI 38.04 kg/m²     Physical Exam  Constitutional:       Appearance: Normal appearance.   HENT:      Head: Normocephalic and atraumatic.      Nose: Nose normal.      Mouth/Throat:      Mouth: Mucous membranes are moist.      Pharynx: Oropharynx is clear.   Eyes:      Conjunctiva/sclera: Conjunctivae normal.   Cardiovascular:      Rate and Rhythm: Normal rate and regular rhythm.      Heart sounds: Normal heart sounds.   Pulmonary:      Effort: Pulmonary effort is normal.      Breath sounds: Normal breath sounds.   Abdominal:      General: Abdomen is flat. Bowel sounds are normal.      Palpations: Abdomen is soft.   Musculoskeletal:         General: Normal range of motion.      Cervical back: Normal range of motion and neck supple.   Skin:     General: Skin is warm and dry.   Neurological:      General: No focal deficit present.      Mental Status: She is alert and oriented to person, place, and time. Mental status is at baseline.   Psychiatric:    "      Mood and Affect: Mood normal.          Labs:  Lab Results   Component Value Date    WBC 4.7 01/14/2025    RBC 4.29 01/14/2025    HGB 13.0 01/14/2025    HCT 39.6 01/14/2025    MCV 92 01/14/2025    MCH 30.3 01/14/2025    MCHC 32.8 01/14/2025    RDW 13.4 01/14/2025     01/14/2025    MPV 11.6 12/16/2024    GRAN 2.3 12/16/2024    GRAN 44.3 12/16/2024    LYMPH 1.9 01/14/2025    MONO 5 01/14/2025    MONO 0.2 01/14/2025    EOS 0.1 01/14/2025    BASO 0.0 01/14/2025    EOSINOPHIL 2 01/14/2025    BASOPHIL 1 01/14/2025     Sodium   Date Value Ref Range Status   01/14/2025 139 134 - 144 mmol/L Final     Potassium   Date Value Ref Range Status   01/14/2025 4.4 3.5 - 5.2 mmol/L Final     Chloride   Date Value Ref Range Status   01/14/2025 103 96 - 106 mmol/L Final     CO2   Date Value Ref Range Status   01/14/2025 23 20 - 29 mmol/L Final     Glucose   Date Value Ref Range Status   01/14/2025 109 (H) 70 - 99 mg/dL Final     BUN   Date Value Ref Range Status   01/14/2025 11 8 - 27 mg/dL Final     Creatinine   Date Value Ref Range Status   01/14/2025 0.87 0.57 - 1.00 mg/dL Final     Calcium   Date Value Ref Range Status   01/14/2025 9.6 8.7 - 10.3 mg/dL Final     Total Protein   Date Value Ref Range Status   12/12/2024 7.0 6.0 - 8.4 g/dL Final     Albumin   Date Value Ref Range Status   01/14/2025 4.0 3.8 - 4.8 g/dL Final   12/12/2024 3.7 3.5 - 5.2 g/dL Final     Total Bilirubin   Date Value Ref Range Status   01/14/2025 0.4 0.0 - 1.2 mg/dL Final     Alkaline Phosphatase   Date Value Ref Range Status   12/12/2024 88 55 - 135 U/L Final     AST   Date Value Ref Range Status   01/14/2025 12 0 - 40 IU/L Final     ALT   Date Value Ref Range Status   01/14/2025 8 0 - 32 IU/L Final     Anion Gap   Date Value Ref Range Status   12/12/2024 6 (L) 8 - 16 mmol/L Final     eGFR if    Date Value Ref Range Status   03/02/2021 >60.0 >60 mL/min/1.73 m^2 Final     eGFR if non    Date Value Ref Range Status    03/02/2021 >60.0 >60 mL/min/1.73 m^2 Final     Comment:     Calculation used to obtain the estimated glomerular filtration  rate (eGFR) is the CKD-EPI equation.          A/P:    Malignant neoplasm of the right breast   -triple negative   -cT1c N0 M0   -will treat with neoadjuvant ACT   -tolerated cycle 1 well.  Had some mucositis.  Otherwise doing good.  Proceed with cycle 2.      Chemotherapy-induced anemia   -continue to monitor   -no dose adjustment needed at this time     Mucositis   -self-limited   -continue to use baking soda mixed mouthwash      Aurash Khoobehi, MD  Hematology and Oncology    Visit today included increased complexity associated with the care of the episodic problem breast cancer addressed and managing the longitudinal care of the patient due to the serious and/or complex managed problem(s).

## 2025-01-30 ENCOUNTER — INFUSION (OUTPATIENT)
Dept: INFUSION THERAPY | Facility: HOSPITAL | Age: 75
End: 2025-01-30
Attending: INTERNAL MEDICINE
Payer: MEDICARE

## 2025-01-30 VITALS
RESPIRATION RATE: 18 BRPM | BODY MASS INDEX: 38.12 KG/M2 | DIASTOLIC BLOOD PRESSURE: 76 MMHG | TEMPERATURE: 97 F | HEIGHT: 65 IN | SYSTOLIC BLOOD PRESSURE: 120 MMHG | OXYGEN SATURATION: 99 % | WEIGHT: 228.81 LBS | HEART RATE: 58 BPM

## 2025-01-30 DIAGNOSIS — C50.211 MALIGNANT NEOPLASM OF UPPER-INNER QUADRANT OF RIGHT BREAST IN FEMALE, ESTROGEN RECEPTOR NEGATIVE: Primary | ICD-10-CM

## 2025-01-30 DIAGNOSIS — Z17.1 MALIGNANT NEOPLASM OF UPPER-INNER QUADRANT OF RIGHT BREAST IN FEMALE, ESTROGEN RECEPTOR NEGATIVE: Primary | ICD-10-CM

## 2025-01-30 PROCEDURE — 96367 TX/PROPH/DG ADDL SEQ IV INF: CPT

## 2025-01-30 PROCEDURE — 96413 CHEMO IV INFUSION 1 HR: CPT

## 2025-01-30 PROCEDURE — 25000003 PHARM REV CODE 250: Performed by: INTERNAL MEDICINE

## 2025-01-30 PROCEDURE — 63600175 PHARM REV CODE 636 W HCPCS: Performed by: INTERNAL MEDICINE

## 2025-01-30 PROCEDURE — 96417 CHEMO IV INFUS EACH ADDL SEQ: CPT

## 2025-01-30 RX ORDER — DOXORUBICIN HYDROCHLORIDE 2 MG/ML
60 INJECTION, SOLUTION INTRAVENOUS
Status: COMPLETED | OUTPATIENT
Start: 2025-01-30 | End: 2025-01-30

## 2025-01-30 RX ORDER — SODIUM CHLORIDE 0.9 % (FLUSH) 0.9 %
10 SYRINGE (ML) INJECTION
Status: DISCONTINUED | OUTPATIENT
Start: 2025-01-30 | End: 2025-01-30 | Stop reason: HOSPADM

## 2025-01-30 RX ORDER — HEPARIN 100 UNIT/ML
500 SYRINGE INTRAVENOUS
Status: DISCONTINUED | OUTPATIENT
Start: 2025-01-30 | End: 2025-01-30 | Stop reason: HOSPADM

## 2025-01-30 RX ADMIN — SODIUM CHLORIDE 0.25 MG: 9 INJECTION, SOLUTION INTRAVENOUS at 10:01

## 2025-01-30 RX ADMIN — SODIUM CHLORIDE: 9 INJECTION, SOLUTION INTRAVENOUS at 10:01

## 2025-01-30 RX ADMIN — DOXORUBICIN HYDROCHLORIDE 130 MG: 2 INJECTION, SOLUTION INTRAVENOUS at 11:01

## 2025-01-30 RX ADMIN — HEPARIN 500 UNITS: 100 SYRINGE at 12:01

## 2025-01-30 RX ADMIN — FOSAPREPITANT 150 MG: 150 INJECTION, POWDER, LYOPHILIZED, FOR SOLUTION INTRAVENOUS at 10:01

## 2025-01-30 RX ADMIN — CYCLOPHOSPHAMIDE 1310 MG: 1000 INJECTION, SOLUTION, CONCENTRATE INTRAVENOUS at 11:01

## 2025-01-30 NOTE — PLAN OF CARE
Problem: Fatigue  Goal: Improved Activity Tolerance  Intervention: Promote Improved Energy  Flowsheets (Taken 1/30/2025 1012)  Fatigue Management: fatigue-related activity identified  Activity Management: Ambulated -L4

## 2025-01-31 ENCOUNTER — INFUSION (OUTPATIENT)
Dept: INFUSION THERAPY | Facility: HOSPITAL | Age: 75
End: 2025-01-31
Attending: INTERNAL MEDICINE
Payer: MEDICARE

## 2025-01-31 VITALS
SYSTOLIC BLOOD PRESSURE: 121 MMHG | HEART RATE: 64 BPM | WEIGHT: 230.13 LBS | OXYGEN SATURATION: 98 % | HEIGHT: 65 IN | DIASTOLIC BLOOD PRESSURE: 72 MMHG | RESPIRATION RATE: 17 BRPM | TEMPERATURE: 98 F | BODY MASS INDEX: 38.34 KG/M2

## 2025-01-31 DIAGNOSIS — Z17.1 MALIGNANT NEOPLASM OF UPPER-INNER QUADRANT OF RIGHT BREAST IN FEMALE, ESTROGEN RECEPTOR NEGATIVE: Primary | ICD-10-CM

## 2025-01-31 DIAGNOSIS — C50.211 MALIGNANT NEOPLASM OF UPPER-INNER QUADRANT OF RIGHT BREAST IN FEMALE, ESTROGEN RECEPTOR NEGATIVE: Primary | ICD-10-CM

## 2025-01-31 PROCEDURE — 96372 THER/PROPH/DIAG INJ SC/IM: CPT

## 2025-01-31 PROCEDURE — 63600175 PHARM REV CODE 636 W HCPCS: Mod: JZ,TB | Performed by: INTERNAL MEDICINE

## 2025-01-31 RX ADMIN — PEGFILGRASTIM 6 MG: 6 INJECTION SUBCUTANEOUS at 02:01

## 2025-01-31 NOTE — PLAN OF CARE
Problem: Fatigue  Goal: Improved Activity Tolerance  Outcome: Progressing  Intervention: Promote Improved Energy  Flowsheets (Taken 1/31/2025 6407)  Fatigue Management:   fatigue-related activity identified   frequent rest breaks encouraged   paced activity encouraged  Sleep/Rest Enhancement:   regular sleep/rest pattern promoted   relaxation techniques promoted  Environmental Support: rest periods encouraged

## 2025-02-07 ENCOUNTER — TELEPHONE (OUTPATIENT)
Dept: HEMATOLOGY/ONCOLOGY | Facility: CLINIC | Age: 75
End: 2025-02-07
Payer: MEDICARE

## 2025-02-07 DIAGNOSIS — B37.0 THRUSH: Primary | ICD-10-CM

## 2025-02-07 RX ORDER — NYSTATIN 100000 [USP'U]/ML
4 SUSPENSION ORAL 4 TIMES DAILY
Qty: 160 ML | Refills: 0 | Status: SHIPPED | OUTPATIENT
Start: 2025-02-07 | End: 2025-02-17

## 2025-02-07 NOTE — TELEPHONE ENCOUNTER
Pt states that she has she has white patches on and under tongue, as well as in her throat.    ----- Message from Abida sent at 2/7/2025 10:13 AM CST -----  Pt needs a CB with new symptom re mouth issues....    [phone] pt CB

## 2025-02-07 NOTE — TELEPHONE ENCOUNTER
Spoke with pt who states that her tongue is black, but denies sores/thrush. She states that this discoloration is not new. Pt states that her mouth is very painful and is requesting magic mouthwash.    ----- Message from Steph sent at 2/7/2025  8:16 AM CST -----  Regarding: Consult/Advisory        Name Of Caller:   Essence      Contact Preference:   [phone]       Nature of call:   Pt states their mouth feels like it's on fire. Requesting a prescription for miracle numbing mouth wash sent to pharmacy below as soon as possible.       [id number] - [place] - [address] [facility].  [address] [place] [address].  [place] [id number]  Phone: [phone] Fax: [phone]

## 2025-02-07 NOTE — TELEPHONE ENCOUNTER
Called pt to notify that Dr Khoobehi has sent in nystatin. Instructions reviewed. Pt advised to call clinic Monday if sx have not improved.

## 2025-02-11 ENCOUNTER — TELEPHONE (OUTPATIENT)
Dept: HEMATOLOGY/ONCOLOGY | Facility: CLINIC | Age: 75
End: 2025-02-11
Payer: MEDICARE

## 2025-02-11 NOTE — TELEPHONE ENCOUNTER
Spoke with pt to notify that d/t low ANC, Dr Khoobehi will be holding tx 1 wk. Labs, office visit, and chemo rescheduled for next wk. Pt verbalized understanding.

## 2025-02-12 ENCOUNTER — TELEPHONE (OUTPATIENT)
Dept: HEMATOLOGY/ONCOLOGY | Facility: CLINIC | Age: 75
End: 2025-02-12
Payer: MEDICARE

## 2025-02-19 ENCOUNTER — DOCUMENTATION ONLY (OUTPATIENT)
Facility: CLINIC | Age: 75
End: 2025-02-19
Payer: MEDICARE

## 2025-02-26 ENCOUNTER — OFFICE VISIT (OUTPATIENT)
Dept: HEMATOLOGY/ONCOLOGY | Facility: CLINIC | Age: 75
End: 2025-02-26
Payer: MEDICARE

## 2025-02-26 VITALS
RESPIRATION RATE: 18 BRPM | OXYGEN SATURATION: 95 % | BODY MASS INDEX: 36.8 KG/M2 | HEIGHT: 65 IN | DIASTOLIC BLOOD PRESSURE: 56 MMHG | SYSTOLIC BLOOD PRESSURE: 121 MMHG | TEMPERATURE: 97 F | HEART RATE: 62 BPM | WEIGHT: 220.88 LBS

## 2025-02-26 DIAGNOSIS — K12.30 MUCOSITIS: ICD-10-CM

## 2025-02-26 DIAGNOSIS — D64.81 ANTINEOPLASTIC CHEMOTHERAPY INDUCED ANEMIA: ICD-10-CM

## 2025-02-26 DIAGNOSIS — E66.01 SEVERE OBESITY (BMI 35.0-39.9) WITH COMORBIDITY: ICD-10-CM

## 2025-02-26 DIAGNOSIS — Z17.1 MALIGNANT NEOPLASM OF UPPER-INNER QUADRANT OF RIGHT BREAST IN FEMALE, ESTROGEN RECEPTOR NEGATIVE: Primary | ICD-10-CM

## 2025-02-26 DIAGNOSIS — C50.211 MALIGNANT NEOPLASM OF UPPER-INNER QUADRANT OF RIGHT BREAST IN FEMALE, ESTROGEN RECEPTOR NEGATIVE: Primary | ICD-10-CM

## 2025-02-26 DIAGNOSIS — T45.1X5A ANTINEOPLASTIC CHEMOTHERAPY INDUCED ANEMIA: ICD-10-CM

## 2025-02-26 PROCEDURE — 1101F PT FALLS ASSESS-DOCD LE1/YR: CPT | Mod: CPTII,S$GLB,, | Performed by: INTERNAL MEDICINE

## 2025-02-26 PROCEDURE — 3288F FALL RISK ASSESSMENT DOCD: CPT | Mod: CPTII,S$GLB,, | Performed by: INTERNAL MEDICINE

## 2025-02-26 PROCEDURE — 3078F DIAST BP <80 MM HG: CPT | Mod: CPTII,S$GLB,, | Performed by: INTERNAL MEDICINE

## 2025-02-26 PROCEDURE — G2211 COMPLEX E/M VISIT ADD ON: HCPCS | Mod: S$GLB,,, | Performed by: INTERNAL MEDICINE

## 2025-02-26 PROCEDURE — 99215 OFFICE O/P EST HI 40 MIN: CPT | Mod: S$GLB,,, | Performed by: INTERNAL MEDICINE

## 2025-02-26 PROCEDURE — 3074F SYST BP LT 130 MM HG: CPT | Mod: CPTII,S$GLB,, | Performed by: INTERNAL MEDICINE

## 2025-02-26 PROCEDURE — 1126F AMNT PAIN NOTED NONE PRSNT: CPT | Mod: CPTII,S$GLB,, | Performed by: INTERNAL MEDICINE

## 2025-02-26 PROCEDURE — 1159F MED LIST DOCD IN RCRD: CPT | Mod: CPTII,S$GLB,, | Performed by: INTERNAL MEDICINE

## 2025-02-26 PROCEDURE — 99999 PR PBB SHADOW E&M-EST. PATIENT-LVL III: CPT | Mod: PBBFAC,,, | Performed by: INTERNAL MEDICINE

## 2025-02-26 RX ORDER — DOXORUBICIN HYDROCHLORIDE 2 MG/ML
60 INJECTION, SOLUTION INTRAVENOUS
Status: CANCELLED | OUTPATIENT
Start: 2025-02-27

## 2025-02-26 RX ORDER — HEPARIN 100 UNIT/ML
500 SYRINGE INTRAVENOUS
Status: CANCELLED | OUTPATIENT
Start: 2025-02-27

## 2025-02-26 RX ORDER — SODIUM CHLORIDE 0.9 % (FLUSH) 0.9 %
10 SYRINGE (ML) INJECTION
Status: CANCELLED | OUTPATIENT
Start: 2025-02-27

## 2025-02-26 NOTE — PROGRESS NOTES
Service Date:  2/26/25    Chief Complaint:  Breast cancer     Essence Herrera is a 75 y.o. female here with breast cancer.  Doing well.  Tolerated cycle 2 well.  It has been 4 weeks since her last treatment as she went on a 2 week break on a cruise.  She denies any issues currently.  Mucositis is better with her mouthwash.  No new complaints to me.    Review of Systems   Constitutional: Negative.  Negative for appetite change and unexpected weight change.   HENT: Negative.  Negative for mouth sores.    Eyes: Negative.  Negative for visual disturbance.   Respiratory: Negative.  Negative for cough and shortness of breath.    Cardiovascular: Negative.  Negative for chest pain.   Gastrointestinal: Negative.  Negative for abdominal pain and diarrhea.   Endocrine: Negative.    Genitourinary: Negative.  Negative for frequency.   Musculoskeletal: Negative.  Negative for back pain.   Integumentary:  Negative for rash. Negative.   Neurological: Negative.  Negative for headaches.   Hematological: Negative.  Negative for adenopathy.   Psychiatric/Behavioral: Negative.  The patient is not nervous/anxious.         Current Outpatient Medications   Medication Instructions    atorvastatin (LIPITOR) 20 mg, Daily    dexAMETHasone (DECADRON) 8 mg, Oral, See admin instructions, Take 8 mg by mouth daily on days 2, 3 and 4 of chemotherapy cycle.    LIDOcaine-prilocaine (EMLA) cream Topical (Top), As needed (PRN)    MOUNJARO 2.5 mg, Weekly    OLANZapine (ZYPREXA) 5 mg, Oral, See admin instructions, Take 1 tablet by mouth nightly on days 1-4 of each chemotherapy cycle.    olmesartan-hydrochlorothiazide (BENICAR HCT) 20-12.5 mg per tablet 1 tablet, Daily    ondansetron (ZOFRAN-ODT) 8 mg, Oral, Every 8 hours PRN    prochlorperazine (COMPAZINE) 10 mg, Oral, Every 6 hours PRN    traZODone (DESYREL) 50 mg, Oral, Nightly        Past Medical History:   Diagnosis Date    Hyperlipidemia     Hypertension         Past Surgical History:   Procedure  "Laterality Date    BUNIONECTOMY Bilateral 1996    CARPAL TUNNEL RELEASE Bilateral 1998    DILATION AND CURETTAGE OF UTERUS      HYSTERECTOMY      TVH with conservation of the ovaries    INSERTION OF TUNNELED CENTRAL VENOUS CATHETER (CVC) WITH SUBCUTANEOUS PORT Right 12/16/2024    Procedure: [id number]-[id number];  Surgeon: Juaquin Sanchez Jr., MD;  Location: [facility];  Service: General;  Laterality: Right;    ROTATOR CUFF REPAIR Right 2005    TUBAL LIGATION          Family History   Problem Relation Name Age of Onset    Breast cancer Maternal Grandfather      Breast cancer Maternal Aunt         Social History     Tobacco Use    Smoking status: Never    Smokeless tobacco: Never   Substance Use Topics    Alcohol use: Not Currently    Drug use: Never         Vitals:    02/26/25 1054   BP: (!) 121/56   Pulse: 62   Resp: 18   Temp: 97.2 °F (36.2 °C)        Physical Exam:  BP (!) 121/56 (BP Location: Right arm, Patient Position: Sitting)   Pulse 62   Temp 97.2 °F (36.2 °C) (Temporal)   Resp 18   Ht 5' 5" (1.651 m)   Wt 100.2 kg (220 lb 14.4 oz)   SpO2 95%   BMI 36.76 kg/m²     Physical Exam  Constitutional:       Appearance: Normal appearance.   HENT:      Head: Normocephalic and atraumatic.      Nose: Nose normal.      Mouth/Throat:      Mouth: Mucous membranes are moist.      Pharynx: Oropharynx is clear.   Eyes:      Conjunctiva/sclera: Conjunctivae normal.   Cardiovascular:      Rate and Rhythm: Normal rate and regular rhythm.      Heart sounds: Normal heart sounds.   Pulmonary:      Effort: Pulmonary effort is normal.      Breath sounds: Normal breath sounds.   Abdominal:      General: Abdomen is flat. Bowel sounds are normal.      Palpations: Abdomen is soft.   Musculoskeletal:         General: Normal range of motion.      Cervical back: Normal range of motion and neck supple.   Skin:     General: Skin is warm and dry.   Neurological:      General: No focal deficit present.      Mental Status: She is " alert and oriented to person, place, and time. Mental status is at baseline.   Psychiatric:         Mood and Affect: Mood normal.          Labs:  Lab Results   Component Value Date    WBC 1.0 (<) 02/07/2025    RBC 3.91 02/07/2025    HGB 11.7 02/07/2025    HCT 35.4 02/07/2025    MCV 91 02/07/2025    MCH 29.9 02/07/2025    MCHC 33.1 02/07/2025    RDW 13.3 02/07/2025     02/07/2025    MPV 11.6 12/16/2024    GRAN 2.3 12/16/2024    GRAN 44.3 12/16/2024    LYMPH 0.7 02/07/2025    MONO 10 02/07/2025    MONO 0.1 02/07/2025    EOS 0.0 02/07/2025    BASO 0.0 02/07/2025    EOSINOPHIL 1 02/07/2025    BASOPHIL 2 02/07/2025     Sodium   Date Value Ref Range Status   02/07/2025 138 134 - 144 mmol/L Final     Potassium   Date Value Ref Range Status   02/07/2025 4.1 3.5 - 5.2 mmol/L Final     Chloride   Date Value Ref Range Status   02/07/2025 100 96 - 106 mmol/L Final     CO2   Date Value Ref Range Status   02/07/2025 25 20 - 29 mmol/L Final     Glucose   Date Value Ref Range Status   02/07/2025 95 70 - 99 mg/dL Final     BUN   Date Value Ref Range Status   02/07/2025 11 8 - 27 mg/dL Final     Creatinine   Date Value Ref Range Status   02/07/2025 0.81 0.57 - 1.00 mg/dL Final     Calcium   Date Value Ref Range Status   02/07/2025 9.5 8.7 - 10.3 mg/dL Final     Total Protein   Date Value Ref Range Status   12/12/2024 7.0 6.0 - 8.4 g/dL Final     Albumin   Date Value Ref Range Status   02/07/2025 3.5 (L) 3.8 - 4.8 g/dL Final   12/12/2024 3.7 3.5 - 5.2 g/dL Final     Total Bilirubin   Date Value Ref Range Status   02/07/2025 0.3 0.0 - 1.2 mg/dL Final     Alkaline Phosphatase   Date Value Ref Range Status   12/12/2024 88 55 - 135 U/L Final     AST   Date Value Ref Range Status   02/07/2025 10 0 - 40 IU/L Final     ALT   Date Value Ref Range Status   02/07/2025 9 0 - 32 IU/L Final     Anion Gap   Date Value Ref Range Status   12/12/2024 6 (L) 8 - 16 mmol/L Final     eGFR if    Date Value Ref Range Status    03/02/2021 >60.0 >60 mL/min/1.73 m^2 Final     eGFR if non    Date Value Ref Range Status   03/02/2021 >60.0 >60 mL/min/1.73 m^2 Final     Comment:     Calculation used to obtain the estimated glomerular filtration  rate (eGFR) is the CKD-EPI equation.          A/P:    Malignant neoplasm of the right breast   -triple negative   -cT1c N0 M0   -on neoadjuvant ACT  -had a 2 week break as she was on a cruise   -okay to proceed with cycle 3   -return to clinic in 2 weeks with labs for next treatment      Chemotherapy-induced anemia   -continue to monitor   -no dose adjustment needed at this time   -overall stable    Mucositis   -self-limited   -continue to use baking soda mixed mouthwash as this helps prevent it      Aurash Khoobehi, MD  Hematology and Oncology    Visit today included increased complexity associated with the care of the episodic problem breast cancer addressed and managing the longitudinal care of the patient due to the serious and/or complex managed problem(s).

## 2025-02-27 ENCOUNTER — INFUSION (OUTPATIENT)
Dept: INFUSION THERAPY | Facility: HOSPITAL | Age: 75
End: 2025-02-27
Attending: INTERNAL MEDICINE
Payer: MEDICARE

## 2025-02-27 VITALS
BODY MASS INDEX: 37.44 KG/M2 | HEIGHT: 65 IN | HEART RATE: 60 BPM | WEIGHT: 224.75 LBS | TEMPERATURE: 97 F | DIASTOLIC BLOOD PRESSURE: 67 MMHG | SYSTOLIC BLOOD PRESSURE: 116 MMHG | RESPIRATION RATE: 18 BRPM

## 2025-02-27 DIAGNOSIS — C50.211 MALIGNANT NEOPLASM OF UPPER-INNER QUADRANT OF RIGHT BREAST IN FEMALE, ESTROGEN RECEPTOR NEGATIVE: Primary | ICD-10-CM

## 2025-02-27 DIAGNOSIS — Z17.1 MALIGNANT NEOPLASM OF UPPER-INNER QUADRANT OF RIGHT BREAST IN FEMALE, ESTROGEN RECEPTOR NEGATIVE: Primary | ICD-10-CM

## 2025-02-27 PROCEDURE — 25000003 PHARM REV CODE 250: Performed by: INTERNAL MEDICINE

## 2025-02-27 PROCEDURE — 96413 CHEMO IV INFUSION 1 HR: CPT

## 2025-02-27 PROCEDURE — 96411 CHEMO IV PUSH ADDL DRUG: CPT

## 2025-02-27 PROCEDURE — 96367 TX/PROPH/DG ADDL SEQ IV INF: CPT

## 2025-02-27 PROCEDURE — 63600175 PHARM REV CODE 636 W HCPCS: Performed by: INTERNAL MEDICINE

## 2025-02-27 PROCEDURE — A4216 STERILE WATER/SALINE, 10 ML: HCPCS | Performed by: INTERNAL MEDICINE

## 2025-02-27 RX ORDER — HEPARIN 100 UNIT/ML
500 SYRINGE INTRAVENOUS
Status: DISCONTINUED | OUTPATIENT
Start: 2025-02-27 | End: 2025-02-27 | Stop reason: HOSPADM

## 2025-02-27 RX ORDER — DOXORUBICIN HYDROCHLORIDE 2 MG/ML
60 INJECTION, SOLUTION INTRAVENOUS
Status: COMPLETED | OUTPATIENT
Start: 2025-02-27 | End: 2025-02-27

## 2025-02-27 RX ORDER — SODIUM CHLORIDE 0.9 % (FLUSH) 0.9 %
10 SYRINGE (ML) INJECTION
Status: DISCONTINUED | OUTPATIENT
Start: 2025-02-27 | End: 2025-02-27 | Stop reason: HOSPADM

## 2025-02-27 RX ADMIN — SODIUM CHLORIDE 0.25 MG: 9 INJECTION, SOLUTION INTRAVENOUS at 08:02

## 2025-02-27 RX ADMIN — HEPARIN 500 UNITS: 100 SYRINGE at 10:02

## 2025-02-27 RX ADMIN — SODIUM CHLORIDE, PRESERVATIVE FREE 10 ML: 5 INJECTION INTRAVENOUS at 10:02

## 2025-02-27 RX ADMIN — SODIUM CHLORIDE: 9 INJECTION, SOLUTION INTRAVENOUS at 08:02

## 2025-02-27 RX ADMIN — DOXORUBICIN HYDROCHLORIDE 130 MG: 2 INJECTION, SOLUTION INTRAVENOUS at 09:02

## 2025-02-27 RX ADMIN — CYCLOPHOSPHAMIDE 1300 MG: 1000 INJECTION, SOLUTION, CONCENTRATE INTRAVENOUS at 09:02

## 2025-02-27 RX ADMIN — FOSAPREPITANT 150 MG: 150 INJECTION, POWDER, LYOPHILIZED, FOR SOLUTION INTRAVENOUS at 08:02

## 2025-02-27 NOTE — PLAN OF CARE
Problem: Fatigue  Goal: Improved Activity Tolerance  2/27/2025 0814 by Frida Braden, RN  Outcome: Met  2/27/2025 0814 by Frida Braden, RN  Outcome: Progressing

## 2025-02-28 ENCOUNTER — INFUSION (OUTPATIENT)
Dept: INFUSION THERAPY | Facility: HOSPITAL | Age: 75
End: 2025-02-28
Attending: INTERNAL MEDICINE
Payer: MEDICARE

## 2025-02-28 VITALS
HEIGHT: 65 IN | HEART RATE: 72 BPM | TEMPERATURE: 97 F | BODY MASS INDEX: 37.78 KG/M2 | RESPIRATION RATE: 18 BRPM | SYSTOLIC BLOOD PRESSURE: 122 MMHG | DIASTOLIC BLOOD PRESSURE: 60 MMHG | WEIGHT: 226.75 LBS

## 2025-02-28 DIAGNOSIS — Z17.1 MALIGNANT NEOPLASM OF UPPER-INNER QUADRANT OF RIGHT BREAST IN FEMALE, ESTROGEN RECEPTOR NEGATIVE: Primary | ICD-10-CM

## 2025-02-28 DIAGNOSIS — C50.211 MALIGNANT NEOPLASM OF UPPER-INNER QUADRANT OF RIGHT BREAST IN FEMALE, ESTROGEN RECEPTOR NEGATIVE: Primary | ICD-10-CM

## 2025-02-28 PROCEDURE — 63600175 PHARM REV CODE 636 W HCPCS: Mod: JZ,TB | Performed by: INTERNAL MEDICINE

## 2025-02-28 PROCEDURE — 96372 THER/PROPH/DIAG INJ SC/IM: CPT

## 2025-02-28 RX ADMIN — PEGFILGRASTIM 6 MG: 6 INJECTION SUBCUTANEOUS at 02:02

## 2025-02-28 NOTE — PLAN OF CARE
Problem: Infection  Goal: Absence of Infection Signs and Symptoms  2/28/2025 1358 by Frida Braden, RN  Outcome: Met  2/28/2025 1358 by Frida Braden, RN  Outcome: Progressing

## 2025-03-10 ENCOUNTER — TELEPHONE (OUTPATIENT)
Dept: HEMATOLOGY/ONCOLOGY | Facility: CLINIC | Age: 75
End: 2025-03-10
Payer: MEDICARE

## 2025-03-12 ENCOUNTER — OFFICE VISIT (OUTPATIENT)
Dept: HEMATOLOGY/ONCOLOGY | Facility: CLINIC | Age: 75
End: 2025-03-12
Payer: MEDICARE

## 2025-03-12 VITALS
SYSTOLIC BLOOD PRESSURE: 131 MMHG | WEIGHT: 218.5 LBS | BODY MASS INDEX: 36.36 KG/M2 | TEMPERATURE: 98 F | HEART RATE: 71 BPM | DIASTOLIC BLOOD PRESSURE: 61 MMHG | RESPIRATION RATE: 17 BRPM

## 2025-03-12 DIAGNOSIS — E66.01 SEVERE OBESITY (BMI 35.0-39.9) WITH COMORBIDITY: ICD-10-CM

## 2025-03-12 DIAGNOSIS — Z17.1 MALIGNANT NEOPLASM OF UPPER-INNER QUADRANT OF RIGHT BREAST IN FEMALE, ESTROGEN RECEPTOR NEGATIVE: ICD-10-CM

## 2025-03-12 DIAGNOSIS — E78.00 PURE HYPERCHOLESTEROLEMIA: ICD-10-CM

## 2025-03-12 DIAGNOSIS — C50.211 MALIGNANT NEOPLASM OF UPPER-INNER QUADRANT OF RIGHT BREAST IN FEMALE, ESTROGEN RECEPTOR NEGATIVE: ICD-10-CM

## 2025-03-12 DIAGNOSIS — I10 ESSENTIAL HYPERTENSION: Primary | ICD-10-CM

## 2025-03-12 PROCEDURE — 3288F FALL RISK ASSESSMENT DOCD: CPT | Mod: CPTII,S$GLB,, | Performed by: INTERNAL MEDICINE

## 2025-03-12 PROCEDURE — 1101F PT FALLS ASSESS-DOCD LE1/YR: CPT | Mod: CPTII,S$GLB,, | Performed by: INTERNAL MEDICINE

## 2025-03-12 PROCEDURE — 99215 OFFICE O/P EST HI 40 MIN: CPT | Mod: S$GLB,,, | Performed by: INTERNAL MEDICINE

## 2025-03-12 PROCEDURE — 3075F SYST BP GE 130 - 139MM HG: CPT | Mod: CPTII,S$GLB,, | Performed by: INTERNAL MEDICINE

## 2025-03-12 PROCEDURE — 1125F AMNT PAIN NOTED PAIN PRSNT: CPT | Mod: CPTII,S$GLB,, | Performed by: INTERNAL MEDICINE

## 2025-03-12 PROCEDURE — 3078F DIAST BP <80 MM HG: CPT | Mod: CPTII,S$GLB,, | Performed by: INTERNAL MEDICINE

## 2025-03-12 PROCEDURE — 99999 PR PBB SHADOW E&M-EST. PATIENT-LVL III: CPT | Mod: PBBFAC,,, | Performed by: INTERNAL MEDICINE

## 2025-03-12 PROCEDURE — 1159F MED LIST DOCD IN RCRD: CPT | Mod: CPTII,S$GLB,, | Performed by: INTERNAL MEDICINE

## 2025-03-12 RX ORDER — SODIUM CHLORIDE 0.9 % (FLUSH) 0.9 %
10 SYRINGE (ML) INJECTION
Status: CANCELLED | OUTPATIENT
Start: 2025-03-13

## 2025-03-12 RX ORDER — DOXORUBICIN HYDROCHLORIDE 2 MG/ML
60 INJECTION, SOLUTION INTRAVENOUS
Status: CANCELLED | OUTPATIENT
Start: 2025-03-13

## 2025-03-12 RX ORDER — HEPARIN 100 UNIT/ML
500 SYRINGE INTRAVENOUS
Status: CANCELLED | OUTPATIENT
Start: 2025-03-13

## 2025-03-12 NOTE — PROGRESS NOTES
Subjective:       Patient ID: Essence Herrera is a 75 y.o. female.    Chief Complaint:  Patient is on chemotherapy visit for ongoing treatment    HPI 75-year-old  woman right breast cancer triple negative T1c N0 M0 on neoadjuvant ACT  Chemotherapy-induced anemia  Mucositis  Review of Systems    Mouth is sore. No taste, eating ok   Losing some weight.   Ok otheriwse    Oncology History:  Stage IB  [[id number] - G2 - ER/ME/HER2(-)]  IDC of the R-UIQ breast   CNBx (11/7/24):            December 20, 2024; presented at multidisciplinary clinic from abnormal screening mammogram right breast positive family history of breast cancer  Patient seen by Dr. Sanchez/ Kaushal  Oncology History   Malignant neoplasm of upper-inner quadrant of right breast in female, estrogen receptor negative   10/28/2024 Cancer Staged    Staging form: Breast, AJCC 8th Edition  - Clinical stage from 10/28/2024: Stage IB (cT1c, cN0, cM0, G2, ER-, ME-, HER2-)     12/9/2024 Initial Diagnosis    Malignant neoplasm of upper-outer quadrant of left breast in female, estrogen receptor negative     1/16/2025 -  Chemotherapy    Treatment Summary   Plan Name: OP BREAST DOSE-DENSE AC-T (DOXORUBICIN CYCLOPHOSPHAMIDE Q2W FOLLOWED BY PACLITAXEL Q2W)  Treatment Goal: Curative  Status: Active  Start Date: 1/16/2025  End Date: 5/9/2025 (Planned)  Provider: Aurash Khoobehi, MD  Chemotherapy: DOXOrubicin chemo injection 132 mg, 60 mg/m2 = 132 mg, Intravenous, Clinic/HOD 1 time, 3 of 4 cycles  Administration: 132 mg (1/16/2025), 130 mg (1/30/2025), 130 mg (2/27/2025)  cycloPHOSphamide 600 mg/m2 = 1,310 mg in 0.9% NaCl 298.1 mL chemo infusion, 600 mg/m2 = 1,320 mg, Intravenous, Clinic/HOD 1 time, 3 of 4 cycles  Administration: 1,310 mg (1/16/2025), 1,310 mg (1/30/2025), 1,300 mg (2/27/2025)  PACLitaxeL (TAXOL) 175 mg/m2 = 378 mg in 0.9% NaCl 500 mL chemo infusion, 175 mg/m2, Intravenous, Clinic/HOD 1 time, 0 of 4 cycles        Past Medical History:    Diagnosis Date    Hyperlipidemia     Hypertension      Past Surgical History:   Procedure Laterality Date    BUNIONECTOMY Bilateral 1996    CARPAL TUNNEL RELEASE Bilateral 1998    DILATION AND CURETTAGE OF UTERUS      HYSTERECTOMY      TVH with conservation of the ovaries    INSERTION OF TUNNELED CENTRAL VENOUS CATHETER (CVC) WITH SUBCUTANEOUS PORT Right 12/16/2024    Procedure: [id number];  Surgeon: Juaquin Sanchez Jr., MD;  Location: [facility];  Service: General;  Laterality: Right;    ROTATOR CUFF REPAIR Right 2005    TUBAL LIGATION       Family History   Problem Relation Name Age of Onset    Breast cancer Maternal Grandfather      Breast cancer Maternal Aunt        Social History     Socioeconomic History    Marital status:    Tobacco Use    Smoking status: Never    Smokeless tobacco: Never   Substance and Sexual Activity    Alcohol use: Not Currently    Drug use: Never    Sexual activity: Not Currently     Social Drivers of Health     Financial Resource Strain: Low Risk  (12/16/2024)    Overall Financial Resource Strain (CARDIA)     Difficulty of Paying Living Expenses: Not very hard   Food Insecurity: No Food Insecurity (12/16/2024)    Hunger Vital Sign     Worried About Running Out of Food in the Last Year: Never true     Ran Out of Food in the Last Year: Never true   Physical Activity: Unknown (12/16/2024)    Exercise Vital Sign     Days of Exercise per Week: 0 days   Stress: No Stress Concern Present (12/16/2024)    [employer] [place] [employer] - Occupational Stress Questionnaire     Feeling of Stress : Not at all   Housing Stability: Unknown (12/16/2024)    Housing Stability Vital Sign     Unable to Pay for Housing in the Last Year: No     Review of patient's allergies indicates:  No Known Allergies  Current Medications[1]    Physical Exam      Lab Results   Component Value Date    WBC 6.0 03/10/2025    HGB 10.9 (L) 03/10/2025    HCT 33.4 (L) 03/10/2025    MCV 92  03/10/2025    PLT 93 (LL) 03/10/2025       BMP  Lab Results   Component Value Date     03/10/2025    K 3.6 03/10/2025     03/10/2025    CO2 23 03/10/2025    BUN 7 (L) 03/10/2025    CREATININE 0.90 03/10/2025    CALCIUM 9.3 03/10/2025    ANIONGAP 6 (L) 12/12/2024    ESTGFRAFRICA >60.0 03/02/2021    EGFRNONAA >60.0 03/02/2021     Lab Results   Component Value Date    FERRITIN 1,212 (H) 03/02/2021         Problem List[2]     Assessment and Plan   Triple negative breast cancer stage I B  Platelets at 93 K proceed with treatment we will phone review patient's disposition after with Dr. BOUDREAUX for either surgical referral or proceeding with next set of neoadjuvant therapy    HTN; well-controlled patient is on Benicar    BMI 37.7 patient on majora attempting to lose weight    Dyslipidemia diet controlled patient is on Lipitor 20 mg daily         [1]   Current Outpatient Medications:     atorvastatin (LIPITOR) 20 MG tablet, Take 20 mg by mouth once daily., Disp: , Rfl:     dexAMETHasone (DECADRON) 4 MG Tab, Take 2 tablets (8 mg total) by mouth As instructed. Take 8 mg by mouth daily on days 2, 3 and 4 of chemotherapy cycle., Disp: 24 tablet, Rfl: 3    LIDOcaine-prilocaine (EMLA) cream, Apply topically as needed (Apply to port site 30 minutes before access as needed)., Disp: 30 g, Rfl: 0    MOUNJARO 2.5 mg/0.5 mL PnIj, Inject 2.5 mg into the skin once a week. (Patient not taking: Reported on 2/26/2025), Disp: , Rfl:     OLANZapine (ZYPREXA) 5 MG tablet, Take 1 tablet (5 mg total) by mouth As instructed. Take 1 tablet by mouth nightly on days 1-4 of each chemotherapy cycle., Disp: 30 tablet, Rfl: 0    olmesartan-hydrochlorothiazide (BENICAR HCT) 20-12.5 mg per tablet, Take 1 tablet by mouth once daily., Disp: , Rfl:     ondansetron (ZOFRAN-ODT) 8 MG TbDL, Take 1 tablet (8 mg total) by mouth every 8 (eight) hours as needed (Nausea)., Disp: 60 tablet, Rfl: 5    prochlorperazine (COMPAZINE) 10 MG tablet, Take 1 tablet (10  mg total) by mouth every 6 (six) hours as needed., Disp: 30 tablet, Rfl: 1    traZODone (DESYREL) 50 MG tablet, Take 1 tablet (50 mg total) by mouth every evening., Disp: 30 tablet, Rfl: 11  [2]   Patient Active Problem List  Diagnosis    Pneumonia due to COVID-19 virus    Essential hypertension    Hyperlipidemia    Acute respiratory failure with hypoxia    Severe obesity (BMI 35.0-39.9) with comorbidity    Malignant neoplasm of upper-inner quadrant of right breast in female, estrogen receptor negative

## 2025-03-12 NOTE — Clinical Note
Ok for treatment aware that platelts are 93k. Please remind DR BOUDREAUX to see whAt he wants to do next . More chemo or proceed with sx and then call pt about what is ti be done

## 2025-03-13 ENCOUNTER — INFUSION (OUTPATIENT)
Dept: INFUSION THERAPY | Facility: HOSPITAL | Age: 75
End: 2025-03-13
Attending: INTERNAL MEDICINE
Payer: MEDICARE

## 2025-03-13 VITALS
HEART RATE: 65 BPM | SYSTOLIC BLOOD PRESSURE: 102 MMHG | DIASTOLIC BLOOD PRESSURE: 64 MMHG | HEIGHT: 65 IN | WEIGHT: 217.38 LBS | BODY MASS INDEX: 36.22 KG/M2 | OXYGEN SATURATION: 98 % | TEMPERATURE: 97 F | RESPIRATION RATE: 16 BRPM

## 2025-03-13 DIAGNOSIS — C50.211 MALIGNANT NEOPLASM OF UPPER-INNER QUADRANT OF RIGHT BREAST IN FEMALE, ESTROGEN RECEPTOR NEGATIVE: Primary | ICD-10-CM

## 2025-03-13 DIAGNOSIS — Z17.1 MALIGNANT NEOPLASM OF UPPER-INNER QUADRANT OF RIGHT BREAST IN FEMALE, ESTROGEN RECEPTOR NEGATIVE: Primary | ICD-10-CM

## 2025-03-13 PROCEDURE — 96413 CHEMO IV INFUSION 1 HR: CPT

## 2025-03-13 PROCEDURE — 96411 CHEMO IV PUSH ADDL DRUG: CPT

## 2025-03-13 PROCEDURE — A4216 STERILE WATER/SALINE, 10 ML: HCPCS | Performed by: INTERNAL MEDICINE

## 2025-03-13 PROCEDURE — 25000003 PHARM REV CODE 250: Performed by: INTERNAL MEDICINE

## 2025-03-13 PROCEDURE — 63600175 PHARM REV CODE 636 W HCPCS: Performed by: INTERNAL MEDICINE

## 2025-03-13 PROCEDURE — 96367 TX/PROPH/DG ADDL SEQ IV INF: CPT

## 2025-03-13 RX ORDER — SODIUM CHLORIDE 0.9 % (FLUSH) 0.9 %
10 SYRINGE (ML) INJECTION
Status: DISCONTINUED | OUTPATIENT
Start: 2025-03-13 | End: 2025-03-13 | Stop reason: HOSPADM

## 2025-03-13 RX ORDER — DOXORUBICIN HYDROCHLORIDE 2 MG/ML
60 INJECTION, SOLUTION INTRAVENOUS
Status: COMPLETED | OUTPATIENT
Start: 2025-03-13 | End: 2025-03-13

## 2025-03-13 RX ORDER — HEPARIN 100 UNIT/ML
500 SYRINGE INTRAVENOUS
Status: DISCONTINUED | OUTPATIENT
Start: 2025-03-13 | End: 2025-03-13 | Stop reason: HOSPADM

## 2025-03-13 RX ADMIN — HEPARIN 500 UNITS: 100 SYRINGE at 11:03

## 2025-03-13 RX ADMIN — FOSAPREPITANT 150 MG: 150 INJECTION, POWDER, LYOPHILIZED, FOR SOLUTION INTRAVENOUS at 09:03

## 2025-03-13 RX ADMIN — SODIUM CHLORIDE 0.25 MG: 9 INJECTION, SOLUTION INTRAVENOUS at 10:03

## 2025-03-13 RX ADMIN — SODIUM CHLORIDE: 9 INJECTION, SOLUTION INTRAVENOUS at 09:03

## 2025-03-13 RX ADMIN — DOXORUBICIN HYDROCHLORIDE 128 MG: 2 INJECTION, SOLUTION INTRAVENOUS at 10:03

## 2025-03-13 RX ADMIN — CYCLOPHOSPHAMIDE 1280 MG: 2000 INJECTION, SOLUTION, CONCENTRATE INTRAVENOUS at 10:03

## 2025-03-13 RX ADMIN — SODIUM CHLORIDE, PRESERVATIVE FREE 10 ML: 5 INJECTION INTRAVENOUS at 11:03

## 2025-03-13 NOTE — PLAN OF CARE
Problem: Fatigue  Goal: Improved Activity Tolerance  Outcome: Progressing  Intervention: Promote Improved Energy  Flowsheets (Taken 3/13/2025 8234)  Fatigue Management:   fatigue-related activity identified   frequent rest breaks encouraged   paced activity encouraged  Sleep/Rest Enhancement:   regular sleep/rest pattern promoted   relaxation techniques promoted  Activity Management: Ambulated -L4  Environmental Support: rest periods encouraged

## 2025-03-14 ENCOUNTER — INFUSION (OUTPATIENT)
Dept: INFUSION THERAPY | Facility: HOSPITAL | Age: 75
End: 2025-03-14
Attending: INTERNAL MEDICINE
Payer: MEDICARE

## 2025-03-14 VITALS
HEIGHT: 65 IN | DIASTOLIC BLOOD PRESSURE: 73 MMHG | SYSTOLIC BLOOD PRESSURE: 114 MMHG | HEART RATE: 75 BPM | BODY MASS INDEX: 36.46 KG/M2 | WEIGHT: 218.81 LBS | OXYGEN SATURATION: 100 % | TEMPERATURE: 97 F | RESPIRATION RATE: 18 BRPM

## 2025-03-14 DIAGNOSIS — Z17.1 MALIGNANT NEOPLASM OF UPPER-INNER QUADRANT OF RIGHT BREAST IN FEMALE, ESTROGEN RECEPTOR NEGATIVE: Primary | ICD-10-CM

## 2025-03-14 DIAGNOSIS — C50.211 MALIGNANT NEOPLASM OF UPPER-INNER QUADRANT OF RIGHT BREAST IN FEMALE, ESTROGEN RECEPTOR NEGATIVE: Primary | ICD-10-CM

## 2025-03-14 PROCEDURE — 96372 THER/PROPH/DIAG INJ SC/IM: CPT

## 2025-03-14 PROCEDURE — 63600175 PHARM REV CODE 636 W HCPCS: Mod: JZ,TB | Performed by: INTERNAL MEDICINE

## 2025-03-14 RX ADMIN — PEGFILGRASTIM 6 MG: 6 INJECTION SUBCUTANEOUS at 02:03

## 2025-03-14 NOTE — PLAN OF CARE
Problem: Fall Injury Risk  Goal: Absence of Fall and Fall-Related Injury  Outcome: Progressing  Intervention: Identify and Manage Contributors  Flowsheets (Taken 3/14/2025 1356)  Self-Care Promotion: independence encouraged  Medication Review/Management: medications reviewed  Intervention: Promote Injury-Free Environment  Flowsheets (Taken 3/14/2025 1356)  Safety Promotion/Fall Prevention: medications reviewed

## 2025-03-24 ENCOUNTER — TELEPHONE (OUTPATIENT)
Dept: HEMATOLOGY/ONCOLOGY | Facility: CLINIC | Age: 75
End: 2025-03-24
Payer: MEDICARE

## 2025-03-24 DIAGNOSIS — Z17.1 MALIGNANT NEOPLASM OF UPPER-INNER QUADRANT OF RIGHT BREAST IN FEMALE, ESTROGEN RECEPTOR NEGATIVE: ICD-10-CM

## 2025-03-24 DIAGNOSIS — C50.211 MALIGNANT NEOPLASM OF UPPER-INNER QUADRANT OF RIGHT BREAST IN FEMALE, ESTROGEN RECEPTOR NEGATIVE: ICD-10-CM

## 2025-03-24 RX ORDER — OLANZAPINE 5 MG/1
5 TABLET ORAL SEE ADMIN INSTRUCTIONS
Qty: 30 TABLET | Refills: 0 | Status: SHIPPED | OUTPATIENT
Start: 2025-03-24

## 2025-03-24 RX ORDER — DEXAMETHASONE 4 MG/1
8 TABLET ORAL SEE ADMIN INSTRUCTIONS
Qty: 24 TABLET | Refills: 3 | Status: SHIPPED | OUTPATIENT
Start: 2025-03-24

## 2025-03-25 LAB
ALBUMIN SERPL-MCNC: 3.6 G/DL (ref 3.8–4.8)
ALP SERPL-CCNC: 104 IU/L (ref 44–121)
ALT SERPL-CCNC: 6 IU/L (ref 0–32)
AST SERPL-CCNC: 12 IU/L (ref 0–40)
BASOPHILS # BLD AUTO: 0.1 X10E3/UL (ref 0–0.2)
BASOPHILS NFR BLD AUTO: 2 %
BILIRUB SERPL-MCNC: 0.4 MG/DL (ref 0–1.2)
BUN SERPL-MCNC: 14 MG/DL (ref 8–27)
BUN/CREAT SERPL: 13 (ref 12–28)
CALCIUM SERPL-MCNC: 9.4 MG/DL (ref 8.7–10.3)
CHLORIDE SERPL-SCNC: 98 MMOL/L (ref 96–106)
CO2 SERPL-SCNC: 23 MMOL/L (ref 20–29)
CREAT SERPL-MCNC: 1.06 MG/DL (ref 0.57–1)
EOSINOPHIL # BLD AUTO: 0 X10E3/UL (ref 0–0.4)
EOSINOPHIL NFR BLD AUTO: 0 %
ERYTHROCYTE [DISTWIDTH] IN BLOOD BY AUTOMATED COUNT: 15 % (ref 11.7–15.4)
EST. GFR  (NO RACE VARIABLE): 55 ML/MIN/1.73
GLOBULIN SER CALC-MCNC: 2.6 G/DL (ref 1.5–4.5)
GLUCOSE SERPL-MCNC: 114 MG/DL (ref 70–99)
HCT VFR BLD AUTO: 31.1 % (ref 34–46.6)
HGB BLD-MCNC: 10.6 G/DL (ref 11.1–15.9)
LYMPHOCYTES # BLD AUTO: 0.9 X10E3/UL (ref 0.7–3.1)
LYMPHOCYTES NFR BLD AUTO: 24 %
MAGNESIUM SERPL-MCNC: 1.8 MG/DL (ref 1.6–2.3)
MCH RBC QN AUTO: 31.2 PG (ref 26.6–33)
MCHC RBC AUTO-ENTMCNC: 34.1 G/DL (ref 31.5–35.7)
MCV RBC AUTO: 92 FL (ref 79–97)
MONOCYTES # BLD AUTO: 0.2 X10E3/UL (ref 0.1–0.9)
MONOCYTES NFR BLD AUTO: 5 %
MORPHOLOGY BLD-IMP: ABNORMAL
NEUTROPHILS # BLD AUTO: 2.7 X10E3/UL (ref 1.4–7)
NEUTROPHILS NFR BLD AUTO: 69 %
PLATELET # BLD AUTO: 153 X10E3/UL (ref 150–450)
POTASSIUM SERPL-SCNC: 3.4 MMOL/L (ref 3.5–5.2)
PROT SERPL-MCNC: 6.2 G/DL (ref 6–8.5)
RBC # BLD AUTO: 3.4 X10E6/UL (ref 3.77–5.28)
SODIUM SERPL-SCNC: 135 MMOL/L (ref 134–144)
WBC # BLD AUTO: 3.9 X10E3/UL (ref 3.4–10.8)

## 2025-03-26 ENCOUNTER — OFFICE VISIT (OUTPATIENT)
Dept: HEMATOLOGY/ONCOLOGY | Facility: CLINIC | Age: 75
End: 2025-03-26
Payer: MEDICARE

## 2025-03-26 ENCOUNTER — TELEPHONE (OUTPATIENT)
Dept: HEMATOLOGY/ONCOLOGY | Facility: CLINIC | Age: 75
End: 2025-03-26

## 2025-03-26 VITALS
DIASTOLIC BLOOD PRESSURE: 55 MMHG | TEMPERATURE: 98 F | OXYGEN SATURATION: 95 % | BODY MASS INDEX: 35.33 KG/M2 | RESPIRATION RATE: 17 BRPM | SYSTOLIC BLOOD PRESSURE: 106 MMHG | HEIGHT: 65 IN | HEART RATE: 77 BPM | WEIGHT: 212.06 LBS

## 2025-03-26 DIAGNOSIS — K12.30 MUCOSITIS: ICD-10-CM

## 2025-03-26 DIAGNOSIS — Z17.1 MALIGNANT NEOPLASM OF UPPER-INNER QUADRANT OF RIGHT BREAST IN FEMALE, ESTROGEN RECEPTOR NEGATIVE: Primary | ICD-10-CM

## 2025-03-26 DIAGNOSIS — C50.211 MALIGNANT NEOPLASM OF UPPER-INNER QUADRANT OF RIGHT BREAST IN FEMALE, ESTROGEN RECEPTOR NEGATIVE: Primary | ICD-10-CM

## 2025-03-26 PROCEDURE — 3074F SYST BP LT 130 MM HG: CPT | Mod: CPTII,S$GLB,, | Performed by: INTERNAL MEDICINE

## 2025-03-26 PROCEDURE — 1159F MED LIST DOCD IN RCRD: CPT | Mod: CPTII,S$GLB,, | Performed by: INTERNAL MEDICINE

## 2025-03-26 PROCEDURE — 99999 PR PBB SHADOW E&M-EST. PATIENT-LVL III: CPT | Mod: PBBFAC,,, | Performed by: INTERNAL MEDICINE

## 2025-03-26 PROCEDURE — 3078F DIAST BP <80 MM HG: CPT | Mod: CPTII,S$GLB,, | Performed by: INTERNAL MEDICINE

## 2025-03-26 PROCEDURE — 99215 OFFICE O/P EST HI 40 MIN: CPT | Mod: S$GLB,,, | Performed by: INTERNAL MEDICINE

## 2025-03-26 PROCEDURE — G2211 COMPLEX E/M VISIT ADD ON: HCPCS | Mod: S$GLB,,, | Performed by: INTERNAL MEDICINE

## 2025-03-26 PROCEDURE — 3288F FALL RISK ASSESSMENT DOCD: CPT | Mod: CPTII,S$GLB,, | Performed by: INTERNAL MEDICINE

## 2025-03-26 PROCEDURE — 1101F PT FALLS ASSESS-DOCD LE1/YR: CPT | Mod: CPTII,S$GLB,, | Performed by: INTERNAL MEDICINE

## 2025-03-26 RX ORDER — EPINEPHRINE 0.3 MG/.3ML
0.3 INJECTION SUBCUTANEOUS ONCE AS NEEDED
Status: CANCELLED | OUTPATIENT
Start: 2025-03-27

## 2025-03-26 RX ORDER — DIPHENHYDRAMINE HYDROCHLORIDE 50 MG/ML
50 INJECTION, SOLUTION INTRAMUSCULAR; INTRAVENOUS ONCE AS NEEDED
Status: CANCELLED | OUTPATIENT
Start: 2025-03-27

## 2025-03-26 RX ORDER — SODIUM CHLORIDE 0.9 % (FLUSH) 0.9 %
10 SYRINGE (ML) INJECTION
Status: CANCELLED | OUTPATIENT
Start: 2025-03-27

## 2025-03-26 RX ORDER — HEPARIN 100 UNIT/ML
500 SYRINGE INTRAVENOUS
Status: CANCELLED | OUTPATIENT
Start: 2025-03-27

## 2025-03-26 RX ORDER — FAMOTIDINE 10 MG/ML
20 INJECTION, SOLUTION INTRAVENOUS
Status: CANCELLED | OUTPATIENT
Start: 2025-03-27

## 2025-03-26 NOTE — PROGRESS NOTES
Service Date:  3/26/25    Chief Complaint:  Breast cancer     Essence Herrera is a 75 y.o. female here with breast cancer.  Doing well.  Mostly having mucositis.  No other complaints.  Noticed that her blood pressure is running on the lower side of normal.  Takes Benicar at home.  Does not check her blood pressure at home.    Review of Systems   Constitutional: Negative.  Negative for appetite change and unexpected weight change.   HENT: Negative.  Negative for mouth sores.    Eyes: Negative.  Negative for visual disturbance.   Respiratory: Negative.  Negative for cough and shortness of breath.    Cardiovascular: Negative.  Negative for chest pain.   Gastrointestinal: Negative.  Negative for abdominal pain and diarrhea.   Endocrine: Negative.    Genitourinary: Negative.  Negative for frequency.   Musculoskeletal: Negative.  Negative for back pain.   Integumentary:  Negative for rash. Negative.   Neurological: Negative.  Negative for headaches.   Hematological: Negative.  Negative for adenopathy.   Psychiatric/Behavioral: Negative.  The patient is not nervous/anxious.         Current Outpatient Medications   Medication Instructions    atorvastatin (LIPITOR) 20 mg, Daily    dexAMETHasone (DECADRON) 8 mg, Oral, See admin instructions, Take 8 mg by mouth daily on days 2, 3 and 4 of chemotherapy cycle.    diphenhydrAMINE-aluminum-magnesium hydroxide-simethicone-LIDOcaine viscous HCl 2% 15 mLs, Swish & Spit, Every 4 hours PRN    LIDOcaine-prilocaine (EMLA) cream Topical (Top), As needed (PRN)    MOUNJARO 2.5 mg, Weekly    OLANZapine (ZYPREXA) 5 mg, Oral, See admin instructions, Take 1 tablet by mouth nightly on days 1-4 of each chemotherapy cycle.    olmesartan-hydrochlorothiazide (BENICAR HCT) 20-12.5 mg per tablet 1 tablet, Daily    ondansetron (ZOFRAN-ODT) 8 mg, Oral, Every 8 hours PRN    prochlorperazine (COMPAZINE) 10 mg, Oral, Every 6 hours PRN    traZODone (DESYREL) 50 mg, Oral, Nightly        Past Medical  "History:   Diagnosis Date    Hyperlipidemia     Hypertension         Past Surgical History:   Procedure Laterality Date    BUNIONECTOMY Bilateral 1996    CARPAL TUNNEL RELEASE Bilateral 1998    DILATION AND CURETTAGE OF UTERUS      HYSTERECTOMY      TVH with conservation of the ovaries    INSERTION OF TUNNELED CENTRAL VENOUS CATHETER (CVC) WITH SUBCUTANEOUS PORT Right 12/16/2024    Procedure: [id number];  Surgeon: Juaquin Sanchez Jr., MD;  Location: [facility];  Service: General;  Laterality: Right;    ROTATOR CUFF REPAIR Right 2005    TUBAL LIGATION          Family History   Problem Relation Name Age of Onset    Breast cancer Maternal Grandfather      Breast cancer Maternal Aunt         Social History     Tobacco Use    Smoking status: Never    Smokeless tobacco: Never   Substance Use Topics    Alcohol use: Not Currently    Drug use: Never         Vitals:    03/26/25 0957   BP: (!) 106/55   Pulse: 77   Resp: 17   Temp: 97.7 °F (36.5 °C)        Physical Exam:  BP (!) 106/55 (BP Location: Right arm, Patient Position: Sitting)   Pulse 77   Temp 97.7 °F (36.5 °C) (Temporal)   Resp 17   Ht 5' 5" (1.651 m)   Wt 96.2 kg (212 lb 1.3 oz)   SpO2 95%   BMI 35.29 kg/m²     Physical Exam  Constitutional:       Appearance: Normal appearance.   HENT:      Head: Normocephalic and atraumatic.      Nose: Nose normal.      Mouth/Throat:      Mouth: Mucous membranes are moist.      Pharynx: Oropharynx is clear.   Eyes:      Conjunctiva/sclera: Conjunctivae normal.   Cardiovascular:      Rate and Rhythm: Normal rate and regular rhythm.      Heart sounds: Normal heart sounds.   Pulmonary:      Effort: Pulmonary effort is normal.      Breath sounds: Normal breath sounds.   Abdominal:      General: Abdomen is flat. Bowel sounds are normal.      Palpations: Abdomen is soft.   Musculoskeletal:         General: Normal range of motion.      Cervical back: Normal range of motion and neck supple.   Skin:     General: Skin " is warm and dry.   Neurological:      General: No focal deficit present.      Mental Status: She is alert and oriented to person, place, and time. Mental status is at baseline.   Psychiatric:         Mood and Affect: Mood normal.          Labs:  Lab Results   Component Value Date    WBC 3.9 03/24/2025    RBC 3.40 (L) 03/24/2025    HGB 10.6 (L) 03/24/2025    HCT 31.1 (L) 03/24/2025    MCV 92 03/24/2025    MCH 31.2 03/24/2025    MCHC 34.1 03/24/2025    RDW 15.0 03/24/2025     03/24/2025    MPV 11.6 12/16/2024    GRAN 2.3 12/16/2024    GRAN 44.3 12/16/2024    LYMPH 0.9 03/24/2025    MONO 5 03/24/2025    MONO 0.2 03/24/2025    EOS 0.0 03/24/2025    BASO 0.1 03/24/2025    EOSINOPHIL 0 03/24/2025    BASOPHIL 2 03/24/2025     Sodium   Date Value Ref Range Status   03/24/2025 135 134 - 144 mmol/L Final     Potassium   Date Value Ref Range Status   03/24/2025 3.4 (L) 3.5 - 5.2 mmol/L Final     Chloride   Date Value Ref Range Status   03/24/2025 98 96 - 106 mmol/L Final     CO2   Date Value Ref Range Status   03/24/2025 23 20 - 29 mmol/L Final     Glucose   Date Value Ref Range Status   03/24/2025 114 (H) 70 - 99 mg/dL Final     BUN   Date Value Ref Range Status   03/24/2025 14 8 - 27 mg/dL Final     Creatinine   Date Value Ref Range Status   03/24/2025 1.06 (H) 0.57 - 1.00 mg/dL Final     Calcium   Date Value Ref Range Status   03/24/2025 9.4 8.7 - 10.3 mg/dL Final     Total Protein   Date Value Ref Range Status   12/12/2024 7.0 6.0 - 8.4 g/dL Final     Albumin   Date Value Ref Range Status   03/24/2025 3.6 (L) 3.8 - 4.8 g/dL Final   12/12/2024 3.7 3.5 - 5.2 g/dL Final     Total Bilirubin   Date Value Ref Range Status   03/24/2025 0.4 0.0 - 1.2 mg/dL Final     Alkaline Phosphatase   Date Value Ref Range Status   12/12/2024 88 55 - 135 U/L Final     AST   Date Value Ref Range Status   03/24/2025 12 0 - 40 IU/L Final     ALT   Date Value Ref Range Status   03/24/2025 6 0 - 32 IU/L Final     Anion Gap   Date Value Ref  Range Status   12/12/2024 6 (L) 8 - 16 mmol/L Final     eGFR if    Date Value Ref Range Status   03/02/2021 >60.0 >60 mL/min/1.73 m^2 Final     eGFR if non    Date Value Ref Range Status   03/02/2021 >60.0 >60 mL/min/1.73 m^2 Final     Comment:     Calculation used to obtain the estimated glomerular filtration  rate (eGFR) is the CKD-EPI equation.          A/P:    Malignant neoplasm of the right breast   -triple negative   -cT1c N0 M0   -on neoadjuvant ACT  -completed AC portion of treatment.  Now we will move on to Taxol.  -labs reviewed   -okay to proceed with chemotherapy  -return to clinic in 2 weeks with labs for next treatment      Chemotherapy-induced anemia   -continue to monitor   -no dose adjustment needed at this time   -overall stable    Mucositis   -states that she ran out of her magic mouthwash and that the symptoms have worsened.  I will give her refill today.    Normal blood pressure on the low end   -normally runs hypertensive   -I asked her to cut her blood pressure medication with Benicar and have    Aurash Khoobehi, MD  Hematology and Oncology    Visit today included increased complexity associated with the care of the episodic problem breast cancer addressed and managing the longitudinal care of the patient due to the serious and/or complex managed problem(s).

## 2025-03-27 ENCOUNTER — INFUSION (OUTPATIENT)
Dept: INFUSION THERAPY | Facility: HOSPITAL | Age: 75
End: 2025-03-27
Attending: INTERNAL MEDICINE
Payer: MEDICARE

## 2025-03-27 ENCOUNTER — TELEPHONE (OUTPATIENT)
Dept: HEMATOLOGY/ONCOLOGY | Facility: CLINIC | Age: 75
End: 2025-03-27
Payer: MEDICARE

## 2025-03-27 VITALS
SYSTOLIC BLOOD PRESSURE: 100 MMHG | RESPIRATION RATE: 18 BRPM | WEIGHT: 212.19 LBS | HEART RATE: 76 BPM | TEMPERATURE: 98 F | HEIGHT: 65 IN | BODY MASS INDEX: 35.35 KG/M2 | DIASTOLIC BLOOD PRESSURE: 56 MMHG

## 2025-03-27 DIAGNOSIS — Z17.1 MALIGNANT NEOPLASM OF UPPER-INNER QUADRANT OF RIGHT BREAST IN FEMALE, ESTROGEN RECEPTOR NEGATIVE: Primary | ICD-10-CM

## 2025-03-27 DIAGNOSIS — C50.211 MALIGNANT NEOPLASM OF UPPER-INNER QUADRANT OF RIGHT BREAST IN FEMALE, ESTROGEN RECEPTOR NEGATIVE: Primary | ICD-10-CM

## 2025-03-27 PROCEDURE — A4216 STERILE WATER/SALINE, 10 ML: HCPCS | Performed by: INTERNAL MEDICINE

## 2025-03-27 PROCEDURE — 96367 TX/PROPH/DG ADDL SEQ IV INF: CPT

## 2025-03-27 PROCEDURE — 96415 CHEMO IV INFUSION ADDL HR: CPT

## 2025-03-27 PROCEDURE — 96375 TX/PRO/DX INJ NEW DRUG ADDON: CPT

## 2025-03-27 PROCEDURE — 25000003 PHARM REV CODE 250: Performed by: INTERNAL MEDICINE

## 2025-03-27 PROCEDURE — 96413 CHEMO IV INFUSION 1 HR: CPT

## 2025-03-27 PROCEDURE — 63600175 PHARM REV CODE 636 W HCPCS: Performed by: INTERNAL MEDICINE

## 2025-03-27 RX ORDER — DIPHENHYDRAMINE HYDROCHLORIDE 50 MG/ML
50 INJECTION, SOLUTION INTRAMUSCULAR; INTRAVENOUS ONCE AS NEEDED
Status: DISCONTINUED | OUTPATIENT
Start: 2025-03-27 | End: 2025-03-27 | Stop reason: HOSPADM

## 2025-03-27 RX ORDER — FAMOTIDINE 10 MG/ML
20 INJECTION, SOLUTION INTRAVENOUS
Status: COMPLETED | OUTPATIENT
Start: 2025-03-27 | End: 2025-03-27

## 2025-03-27 RX ORDER — EPINEPHRINE 0.3 MG/.3ML
0.3 INJECTION SUBCUTANEOUS ONCE AS NEEDED
Status: DISCONTINUED | OUTPATIENT
Start: 2025-03-27 | End: 2025-03-27 | Stop reason: HOSPADM

## 2025-03-27 RX ORDER — HEPARIN 100 UNIT/ML
500 SYRINGE INTRAVENOUS
Status: DISCONTINUED | OUTPATIENT
Start: 2025-03-27 | End: 2025-03-27 | Stop reason: HOSPADM

## 2025-03-27 RX ORDER — SODIUM CHLORIDE 0.9 % (FLUSH) 0.9 %
10 SYRINGE (ML) INJECTION
Status: DISCONTINUED | OUTPATIENT
Start: 2025-03-27 | End: 2025-03-27 | Stop reason: HOSPADM

## 2025-03-27 RX ADMIN — Medication 10 ML: at 01:03

## 2025-03-27 RX ADMIN — PACLITAXEL 366 MG: 6 INJECTION, SOLUTION INTRAVENOUS at 10:03

## 2025-03-27 RX ADMIN — HEPARIN 500 UNITS: 100 SYRINGE at 01:03

## 2025-03-27 RX ADMIN — FOSAPREPITANT 150 MG: 150 INJECTION, POWDER, LYOPHILIZED, FOR SOLUTION INTRAVENOUS at 09:03

## 2025-03-27 RX ADMIN — SODIUM CHLORIDE: 9 INJECTION, SOLUTION INTRAVENOUS at 09:03

## 2025-03-27 RX ADMIN — SODIUM CHLORIDE 50 MG: 9 INJECTION, SOLUTION INTRAVENOUS at 10:03

## 2025-03-27 RX ADMIN — SODIUM CHLORIDE 20 MG: 9 INJECTION, SOLUTION INTRAVENOUS at 09:03

## 2025-03-27 RX ADMIN — FAMOTIDINE 20 MG: 10 INJECTION INTRAVENOUS at 09:03

## 2025-03-27 NOTE — PLAN OF CARE
Problem: Fall Injury Risk  Goal: Absence of Fall and Fall-Related Injury  Outcome: Progressing  Intervention: Identify and Manage Contributors  Flowsheets (Taken 3/27/2025 0852)  Self-Care Promotion: independence encouraged  Medication Review/Management: medications reviewed  Intervention: Promote Injury-Free Environment  Flowsheets (Taken 3/27/2025 0852)  Safety Promotion/Fall Prevention: medications reviewed

## 2025-03-27 NOTE — TELEPHONE ENCOUNTER
----- Message from Lalita sent at 3/27/2025 12:00 PM CDT -----  Pt would like a call back with status of [facility] paperwork [phone]

## 2025-03-28 ENCOUNTER — INFUSION (OUTPATIENT)
Dept: INFUSION THERAPY | Facility: HOSPITAL | Age: 75
End: 2025-03-28
Attending: INTERNAL MEDICINE
Payer: MEDICARE

## 2025-03-28 VITALS
BODY MASS INDEX: 35.32 KG/M2 | SYSTOLIC BLOOD PRESSURE: 131 MMHG | WEIGHT: 212 LBS | TEMPERATURE: 97 F | RESPIRATION RATE: 17 BRPM | HEIGHT: 65 IN | DIASTOLIC BLOOD PRESSURE: 71 MMHG | HEART RATE: 82 BPM

## 2025-03-28 DIAGNOSIS — C50.211 MALIGNANT NEOPLASM OF UPPER-INNER QUADRANT OF RIGHT BREAST IN FEMALE, ESTROGEN RECEPTOR NEGATIVE: Primary | ICD-10-CM

## 2025-03-28 DIAGNOSIS — Z17.1 MALIGNANT NEOPLASM OF UPPER-INNER QUADRANT OF RIGHT BREAST IN FEMALE, ESTROGEN RECEPTOR NEGATIVE: Primary | ICD-10-CM

## 2025-03-28 PROCEDURE — 96372 THER/PROPH/DIAG INJ SC/IM: CPT

## 2025-03-28 PROCEDURE — 63600175 PHARM REV CODE 636 W HCPCS: Mod: JZ,TB | Performed by: INTERNAL MEDICINE

## 2025-03-28 RX ADMIN — PEGFILGRASTIM 6 MG: 6 INJECTION SUBCUTANEOUS at 04:03

## 2025-03-28 NOTE — PLAN OF CARE
Problem: Fatigue  Goal: Improved Activity Tolerance  Intervention: Promote Improved Energy  Flowsheets (Taken 3/28/2025 1615)  Fatigue Management: fatigue-related activity identified  Activity Management: Ambulated -L4

## 2025-04-01 ENCOUNTER — TELEPHONE (OUTPATIENT)
Dept: HEMATOLOGY/ONCOLOGY | Facility: CLINIC | Age: 75
End: 2025-04-01
Payer: MEDICARE

## 2025-04-01 ENCOUNTER — HOSPITAL ENCOUNTER (EMERGENCY)
Facility: HOSPITAL | Age: 75
Discharge: HOME | End: 2025-04-01
Attending: EMERGENCY MEDICINE
Payer: MEDICARE

## 2025-04-01 ENCOUNTER — HOSPITAL ENCOUNTER (OUTPATIENT)
Dept: CARDIOLOGY | Facility: HOSPITAL | Age: 75
Discharge: HOME | End: 2025-04-01
Attending: INTERNAL MEDICINE
Payer: MEDICARE

## 2025-04-01 VITALS
RESPIRATION RATE: 18 BRPM | HEART RATE: 95 BPM | WEIGHT: 212 LBS | OXYGEN SATURATION: 98 % | SYSTOLIC BLOOD PRESSURE: 130 MMHG | TEMPERATURE: 98 F | BODY MASS INDEX: 35.32 KG/M2 | HEIGHT: 65 IN | DIASTOLIC BLOOD PRESSURE: 82 MMHG

## 2025-04-01 VITALS — WEIGHT: 212 LBS | HEIGHT: 65 IN | BODY MASS INDEX: 35.32 KG/M2

## 2025-04-01 DIAGNOSIS — R79.89 ELEVATED D-DIMER: ICD-10-CM

## 2025-04-01 DIAGNOSIS — C50.211 MALIGNANT NEOPLASM OF UPPER-INNER QUADRANT OF RIGHT BREAST IN FEMALE, ESTROGEN RECEPTOR NEGATIVE: ICD-10-CM

## 2025-04-01 DIAGNOSIS — R06.02 SHORTNESS OF BREATH: ICD-10-CM

## 2025-04-01 DIAGNOSIS — Z85.3 HISTORY OF BREAST CANCER: Primary | ICD-10-CM

## 2025-04-01 DIAGNOSIS — Z17.1 MALIGNANT NEOPLASM OF UPPER-INNER QUADRANT OF RIGHT BREAST IN FEMALE, ESTROGEN RECEPTOR NEGATIVE: ICD-10-CM

## 2025-04-01 LAB
ANION GAP (SMH): 10 MMOL/L (ref 8–16)
BNP SERPL-MCNC: 191 PG/ML
BUN SERPL-MCNC: 17 MG/DL (ref 8–23)
CALCIUM SERPL-MCNC: 9.2 MG/DL (ref 8.7–10.5)
CHLORIDE SERPL-SCNC: 102 MMOL/L (ref 95–110)
CO2 SERPL-SCNC: 23 MMOL/L (ref 23–29)
CREAT SERPL-MCNC: 0.9 MG/DL (ref 0.5–1.4)
D DIMER PPP IA.FEU-MCNC: 8.09 MG/L FEU
EOSINOPHIL NFR BLD MANUAL: 1 % (ref 0–8)
ERYTHROCYTE [DISTWIDTH] IN BLOOD BY AUTOMATED COUNT: 15.9 % (ref 11.5–14.5)
GFR SERPLBLD CREATININE-BSD FMLA CKD-EPI: >60 ML/MIN/1.73/M2
GLUCOSE SERPL-MCNC: 110 MG/DL (ref 70–110)
HCT VFR BLD AUTO: 30.2 % (ref 37–48.5)
HGB BLD-MCNC: 10.1 GM/DL (ref 12–16)
LYMPHOCYTES NFR BLD MANUAL: 7 % (ref 18–48)
MCH RBC QN AUTO: 30.9 PG (ref 27–31)
MCHC RBC AUTO-ENTMCNC: 33.4 G/DL (ref 32–36)
MCV RBC AUTO: 92 FL (ref 82–98)
METAMYELOCYTES NFR BLD MANUAL: 9 %
MONOCYTES NFR BLD MANUAL: 1 % (ref 4–15)
MYELOCYTES NFR BLD MANUAL: 6 %
NEUTROPHILS NFR BLD MANUAL: 70 % (ref 38–73)
NEUTS BAND NFR BLD MANUAL: 6 %
NUCLEATED RBC (/100WBC) (SMH): 0 /100 WBC
PLATELET # BLD AUTO: 286 K/UL (ref 150–450)
PMV BLD AUTO: 11.4 FL (ref 9.2–12.9)
POTASSIUM SERPL-SCNC: 3.6 MMOL/L (ref 3.5–5.1)
RBC # BLD AUTO: 3.27 M/UL (ref 4–5.4)
SODIUM SERPL-SCNC: 135 MMOL/L (ref 136–145)
TROPONIN HIGH SENSITIVE (SMH): 26.8 PG/ML
TROPONIN HIGH SENSITIVE (SMH): 29 PG/ML
WBC # BLD AUTO: 26.77 K/UL (ref 3.9–12.7)

## 2025-04-01 PROCEDURE — 83880 ASSAY OF NATRIURETIC PEPTIDE: CPT | Performed by: EMERGENCY MEDICINE

## 2025-04-01 PROCEDURE — 85007 BL SMEAR W/DIFF WBC COUNT: CPT | Performed by: EMERGENCY MEDICINE

## 2025-04-01 PROCEDURE — 93306 TTE W/DOPPLER COMPLETE: CPT

## 2025-04-01 PROCEDURE — 85379 FIBRIN DEGRADATION QUANT: CPT | Performed by: EMERGENCY MEDICINE

## 2025-04-01 PROCEDURE — 80048 BASIC METABOLIC PNL TOTAL CA: CPT | Performed by: EMERGENCY MEDICINE

## 2025-04-01 PROCEDURE — 93005 ELECTROCARDIOGRAM TRACING: CPT | Performed by: INTERNAL MEDICINE

## 2025-04-01 PROCEDURE — 93010 ELECTROCARDIOGRAM REPORT: CPT | Mod: ,,, | Performed by: INTERNAL MEDICINE

## 2025-04-01 PROCEDURE — 84484 ASSAY OF TROPONIN QUANT: CPT | Performed by: EMERGENCY MEDICINE

## 2025-04-01 PROCEDURE — 25500020 PHARM REV CODE 255: Performed by: EMERGENCY MEDICINE

## 2025-04-01 PROCEDURE — 99285 EMERGENCY DEPT VISIT HI MDM: CPT | Mod: 25

## 2025-04-01 PROCEDURE — 93306 TTE W/DOPPLER COMPLETE: CPT | Mod: 26,,, | Performed by: INTERNAL MEDICINE

## 2025-04-01 RX ADMIN — IOHEXOL 100 ML: 350 INJECTION, SOLUTION INTRAVENOUS at 08:04

## 2025-04-01 NOTE — TELEPHONE ENCOUNTER
Incoming call from pt stating that she is feeling weak and SOB and her legs and feet are tingling. She states that these sx started yesterday. Her last chemo tx was 3/27 and she received a neulasta injection on 3/28. Dr Khoobehi notified.

## 2025-04-01 NOTE — ED PROVIDER NOTES
Chief complaint:  Leg Swelling and Shortness of Breath      HPI:  Essence Herrera is a 75 y.o. female with hx htn, breast CA on chemotherapy with mucositis presenting with shortness of breath for two days marked with dyspnea on exertion, but not at rest.  No orthopnea.  No associated chest pain, pleuritic pain, cough, fever.  To clarify triage note she has chronic leg swelling that is unchanged with some slight asymmetry, stating that the left leg is typically slightly larger than the right.  She does complain of some slight new pins and needles feeling to the distal lower extremities absent any other numbness or weakness.    ROS: As per HPI and below:  No blood in the stools, dark stools, emesis, fever, syncope, oliguria, dysuria, sick contacts.    Review of patient's allergies indicates:  No Known Allergies    Patient's Medications   New Prescriptions    No medications on file   Previous Medications    ATORVASTATIN (LIPITOR) 20 MG TABLET    Take 20 mg by mouth once daily.    DEXAMETHASONE (DECADRON) 4 MG TAB    Take 2 tablets (8 mg total) by mouth As instructed. Take 8 mg by mouth daily on days 2, 3 and 4 of chemotherapy cycle.    DIPHENHYDRAMINE-ALUMINUM-MAGNESIUM HYDROXIDE-SIMETHICONE-LIDOCAINE VISCOUS HCL 2%    Swish and spit 15 mLs every 4 (four) hours as needed.    LIDOCAINE-PRILOCAINE (EMLA) CREAM    Apply topically as needed (Apply to port site 30 minutes before access as needed).    MAGIC MOUTHWASH DIPHEN/ANTAC/LIDOC    Swish and spit 15 mLs every 4 (four) hours as needed.    MOUNJARO 2.5 MG/0.5 ML PNIJ    Inject 2.5 mg into the skin once a week.    OLANZAPINE (ZYPREXA) 5 MG TABLET    Take 1 tablet (5 mg total) by mouth As instructed. Take 1 tablet by mouth nightly on days 1-4 of each chemotherapy cycle.    OLMESARTAN-HYDROCHLOROTHIAZIDE (BENICAR HCT) 20-12.5 MG PER TABLET    Take 1 tablet by mouth once daily.    ONDANSETRON (ZOFRAN-ODT) 8 MG TBDL    Take 1 tablet (8 mg total) by mouth every 8 (eight)  hours as needed (Nausea).    PROCHLORPERAZINE (COMPAZINE) 10 MG TABLET    Take 1 tablet (10 mg total) by mouth every 6 (six) hours as needed.    TRAZODONE (DESYREL) 50 MG TABLET    Take 1 tablet (50 mg total) by mouth every evening.   Modified Medications    No medications on file   Discontinued Medications    No medications on file       PMH:  As per HPI and below:  Past Medical History:   Diagnosis Date    Hyperlipidemia     Hypertension      Past Surgical History:   Procedure Laterality Date    BUNIONECTOMY Bilateral 1996    CARPAL TUNNEL RELEASE Bilateral 1998    DILATION AND CURETTAGE OF UTERUS      HYSTERECTOMY      TVH with conservation of the ovaries    INSERTION OF TUNNELED CENTRAL VENOUS CATHETER (CVC) WITH SUBCUTANEOUS PORT Right 12/16/2024    Procedure: [id number];  Surgeon: Juaquin Sanchez Jr., MD;  Location: [facility] OR;  Service: General;  Laterality: Right;    ROTATOR CUFF REPAIR Right 2005    TUBAL LIGATION         Social History     Socioeconomic History    Marital status:    Tobacco Use    Smoking status: Never    Smokeless tobacco: Never   Substance and Sexual Activity    Alcohol use: Not Currently    Drug use: Never    Sexual activity: Not Currently     Social Drivers of Health     Financial Resource Strain: Low Risk  (12/16/2024)    Overall Financial Resource Strain (CARDIA)     Difficulty of Paying Living Expenses: Not very hard   Food Insecurity: No Food Insecurity (12/16/2024)    Hunger Vital Sign     Worried About Running Out of Food in the Last Year: Never true     Ran Out of Food in the Last Year: Never true   Physical Activity: Unknown (12/16/2024)    Exercise Vital Sign     Days of Exercise per Week: 0 days   Stress: No Stress Concern Present (12/16/2024)    [place] [employer] - Occupational Stress Questionnaire     Feeling of Stress : Not at all   Housing Stability: Unknown (12/16/2024)    Housing Stability Vital Sign     Unable to Pay for  Housing in the Last Year: No       Family History   Problem Relation Name Age of Onset    Breast cancer Maternal Grandfather      Breast cancer Maternal Aunt         Physical Exam:    Vitals:    04/01/25 1703   BP: 130/82   Pulse: 95   Resp: 18   Temp: 98 °F (36.7 °C)     GENERAL:  No apparent distress.  Alert.    HEENT:  Moist mucous membranes.  Normocephalic and atraumatic.    NECK:  No swelling.  Midline trachea.   CARDIOVASCULAR:  Regular rate and rhythm.  2+ radial pulses.  No murmur.  PULMONARY:  Lungs clear to auscultation bilaterally.  No wheezes, rales, or rhonci.  Unlabored respirations.  No tachypnea.  ABDOMEN:  Non-tender and non-distended.    EXTREMITIES:  Warm and well perfused.  Brisk capillary refill.  1+ pitting pedal edema.  2+ DP and PT pulses bilaterally.  Lower extremities are nontender to palpation.  NEUROLOGICAL:  Normal mental status.  Appropriate and conversant.  5/5 strength with equal sensation to light touch in the distal lower extremities.  SKIN:  No rashes or ecchymoses.    BACK:  Atraumatic.  No CVA tenderness to palpation.      Labs Reviewed   BASIC METABOLIC PANEL - Abnormal       Result Value    Sodium 135 (*)     Potassium 3.6      Chloride 102      CO2 23      Glucose 110      BUN 17      Creatinine 0.9      Calcium 9.2      Anion Gap 10      eGFR >60     TROPONIN I HIGH SENSITIVITY - Abnormal    Troponin High Sensitive 29.0 (*)    B-TYPE NATRIURETIC PEPTIDE - Abnormal     (*)    D DIMER, QUANTITATIVE - Abnormal    D-Dimer 8.09 (*)    CBC WITH DIFFERENTIAL - Abnormal    WBC 26.77 (*)     RBC 3.27 (*)     Hgb 10.1 (*)     Hct 30.2 (*)     MCV 92      MCH 30.9      MCHC 33.4      RDW 15.9 (*)     Platelet Count 286      MPV 11.4      Nucleated RBC 0     MANUAL DIFFERENTIAL - Abnormal    Segmented Neutrophil % 70.0      Bands % 6.0      Lymphocyte % 7.0 (*)     Monocyte % 1.0 (*)     Eosinophil % 1.0      Metamyelocyte % 9.0      Myelocyte % 6.0     CBC W/ AUTO DIFFERENTIAL     Narrative:     The following orders were created for panel order CBC auto differential.  Procedure                               Abnormality         Status                     ---------                               -----------         ------                     CBC with Differential[[id number]]       Abnormal            Final result               Manual Differential[[id number]]         Abnormal            Final result                 Please view results for these tests on the individual orders.   TROPONIN I HIGH SENSITIVITY       Current Discharge Medication List        CONTINUE these medications which have NOT CHANGED    Details   atorvastatin (LIPITOR) 20 MG tablet Take 20 mg by mouth once daily.      dexAMETHasone (DECADRON) 4 MG Tab Take 2 tablets (8 mg total) by mouth As instructed. Take 8 mg by mouth daily on days 2, 3 and 4 of chemotherapy cycle.  Qty: 24 tablet, Refills: 3    Associated Diagnoses: Malignant neoplasm of upper-inner quadrant of right breast in female, estrogen receptor negative      diphenhydrAMINE-aluminum-magnesium hydroxide-simethicone-LIDOcaine viscous HCl 2% Swish and spit 15 mLs every 4 (four) hours as needed.  Qty: 1 each, Refills: 2    Associated Diagnoses: Mucositis      LIDOcaine-prilocaine (EMLA) cream Apply topically as needed (Apply to port site 30 minutes before access as needed).  Qty: 30 g, Refills: 0    Associated Diagnoses: Malignant neoplasm of upper-inner quadrant of right breast in female, estrogen receptor negative      magic mouthwash diphen/antac/lidoc Swish and spit 15 mLs every 4 (four) hours as needed.  Qty: 450 mL, Refills: 2      MOUNJARO 2.5 mg/0.5 mL PnIj Inject 2.5 mg into the skin once a week.      OLANZapine (ZYPREXA) 5 MG tablet Take 1 tablet (5 mg total) by mouth As instructed. Take 1 tablet by mouth nightly on days 1-4 of each chemotherapy cycle.  Qty: 30 tablet, Refills: 0    Associated Diagnoses: Malignant neoplasm of upper-inner quadrant of right breast  in female, estrogen receptor negative      olmesartan-hydrochlorothiazide (BENICAR HCT) 20-12.5 mg per tablet Take 1 tablet by mouth once daily.      ondansetron (ZOFRAN-ODT) 8 MG TbDL Take 1 tablet (8 mg total) by mouth every 8 (eight) hours as needed (Nausea).  Qty: 60 tablet, Refills: 5    Associated Diagnoses: Malignant neoplasm of upper-inner quadrant of right breast in female, estrogen receptor negative      prochlorperazine (COMPAZINE) 10 MG tablet Take 1 tablet (10 mg total) by mouth every 6 (six) hours as needed.  Qty: 30 tablet, Refills: 1    Associated Diagnoses: Malignant neoplasm of upper-inner quadrant of right breast in female, estrogen receptor negative      traZODone (DESYREL) 50 MG tablet Take 1 tablet (50 mg total) by mouth every evening.  Qty: 30 tablet, Refills: 11    Associated Diagnoses: Insomnia, unspecified type             Orders Placed This Encounter   Procedures    X-Ray Chest 1 View    CTA Chest Non-Coronary (PE Studies)    CBC auto differential    Basic metabolic panel (BMP)    Troponin I High Sensitivity    Brain Natriuertic Peptide (BNP)    D dimer, quantitative    CBC with Differential    Manual Differential    Troponin I High Sensitivity    Cardiac Monitoring - Adult    Pulse Oximetry Continuous    EKG 12-lead    Insert Saline lock IV       Imaging Results              CTA Chest Non-Coronary (PE Studies) (In process)                      X-Ray Chest 1 View (Final result)  Result time 04/01/25 18:19:09      Final result by Peter Martinez DO (04/01/25 18:19:09)                   Impression:      No acute abnormality.      Electronically signed by: Peter Martinez  Date:    04/01/2025  Time:    18:19               Narrative:    EXAMINATION:  XR CHEST 1 VIEW    CLINICAL HISTORY:  SOB;    TECHNIQUE:  Single frontal view of the chest was performed.    COMPARISON:  12/16/2024.    FINDINGS:  There is a right-sided port.  The lungs are well expanded and clear. No focal opacities are seen.  The pleural spaces are clear. The cardiac silhouette is unremarkable. The visualized osseous structures are unremarkable.  There are anchor screws overlying the right humeral head.                                      ED Course as of 04/01/25 2103   Tue Apr 01, 2025 1821 CXR:  NAD compared to prior. (Independently interpreted by me) [MR]   1822 EKG:  Normal sinus rhythm at a rate of 81.  Normal intervals.  Normal axis.  No significant ST or T wave changes suggesting acute ischemia or infarction.  (Independently interpreted by me)   [MR]   2019 Leukocytosis noted here with patient noted to have receive 20 mg of dexamethasone on March 26th along with chemotherapy as possible contributing etiology.  Pegfilgrastim previously given 3/14/25 as well. [MR]      ED Course User Index  [MR] Matthew Damon MD       MDM:    75 y.o. female with recent exertional dyspnea.  She is on chemotherapy for breast CA. broad workup undertaken.  She is comfortable and asymptomatic at rest.  Low suspicion for cardiac etiology with EKG ordered.  Cardiac biomarker sent for further risk stratification of ACS with low suspicion.  Other lab sent to exclude end-organ dysfunction.  Low suspicion for PE with D-dimer sent for risk stratification as well.  Chest x-ray done with clear lungs on auscultation.  Low suspicion for pneumonia.      Troponin repeat is planned for exclusion of ACS and D-dimer elevated with CTA chest ordered to exclude PE as well.  Patient will be signed out pending repeat of the studies with consideration of discharge and close follow-up if she remains appearing well and studies prove unremarkable.  Patient and family updated on awaiting test with the patient signed out pending further testing prior to disposition.    Diagnoses:    1. SOB with exertion       Matthew Damon MD  04/01/25 2103

## 2025-04-01 NOTE — TELEPHONE ENCOUNTER
Spoke with pt to schedule appt this wk per provider recs. Pt confirmed. Pt also advised to go to ED if she has difficulty breathing. Pt verbalized understanding.

## 2025-04-02 LAB
OHS QRS DURATION: 80 MS
OHS QTC CALCULATION: 434 MS

## 2025-04-02 NOTE — DISCHARGE INSTRUCTIONS
Please return to the emergency department if you have new or worsening symptoms.  Follow up with Hematology and Oncology for further evaluation of your symptoms.

## 2025-04-02 NOTE — PROVIDER PROGRESS NOTES - EMERGENCY DEPT.
Encounter Date: 4/1/2025    ED Physician Progress Notes        Physician Note:   Care handed off to me pending repeat troponin and CT PE.  Repeat troponin was down trending at PE study was negative.  Patient is well-appearing and at baseline.  Workup overall benign other than leukocytosis, but patient did just receive 20 mg of Decadron 4 days ago.  No evidence of infectious focus at this time.  D-dimer is elevated although this may be secondary to ongoing chemotherapy and breast cancer.  Instructed to follow up with Hematology and Oncology.    Santhosh De La Fuente MD  04/01/2025 9:47 PM

## 2025-04-03 ENCOUNTER — OFFICE VISIT (OUTPATIENT)
Dept: HEMATOLOGY/ONCOLOGY | Facility: CLINIC | Age: 75
End: 2025-04-03
Payer: MEDICARE

## 2025-04-03 VITALS
TEMPERATURE: 97 F | DIASTOLIC BLOOD PRESSURE: 55 MMHG | SYSTOLIC BLOOD PRESSURE: 110 MMHG | HEIGHT: 65 IN | RESPIRATION RATE: 18 BRPM | WEIGHT: 213.38 LBS | BODY MASS INDEX: 35.55 KG/M2 | HEART RATE: 95 BPM | OXYGEN SATURATION: 100 %

## 2025-04-03 DIAGNOSIS — G62.9 NEUROPATHY: Primary | ICD-10-CM

## 2025-04-03 DIAGNOSIS — G62.9 NEUROPATHY: ICD-10-CM

## 2025-04-03 DIAGNOSIS — K12.30 MUCOSITIS: Primary | ICD-10-CM

## 2025-04-03 PROCEDURE — 3078F DIAST BP <80 MM HG: CPT | Mod: CPTII,S$GLB,, | Performed by: INTERNAL MEDICINE

## 2025-04-03 PROCEDURE — 3288F FALL RISK ASSESSMENT DOCD: CPT | Mod: CPTII,S$GLB,, | Performed by: INTERNAL MEDICINE

## 2025-04-03 PROCEDURE — 1126F AMNT PAIN NOTED NONE PRSNT: CPT | Mod: CPTII,S$GLB,, | Performed by: INTERNAL MEDICINE

## 2025-04-03 PROCEDURE — 1159F MED LIST DOCD IN RCRD: CPT | Mod: CPTII,S$GLB,, | Performed by: INTERNAL MEDICINE

## 2025-04-03 PROCEDURE — 1101F PT FALLS ASSESS-DOCD LE1/YR: CPT | Mod: CPTII,S$GLB,, | Performed by: INTERNAL MEDICINE

## 2025-04-03 PROCEDURE — 3074F SYST BP LT 130 MM HG: CPT | Mod: CPTII,S$GLB,, | Performed by: INTERNAL MEDICINE

## 2025-04-03 PROCEDURE — 99999 PR PBB SHADOW E&M-EST. PATIENT-LVL IV: CPT | Mod: PBBFAC,,, | Performed by: INTERNAL MEDICINE

## 2025-04-03 PROCEDURE — G2211 COMPLEX E/M VISIT ADD ON: HCPCS | Mod: S$GLB,,, | Performed by: INTERNAL MEDICINE

## 2025-04-03 PROCEDURE — 1160F RVW MEDS BY RX/DR IN RCRD: CPT | Mod: CPTII,S$GLB,, | Performed by: INTERNAL MEDICINE

## 2025-04-03 PROCEDURE — 99214 OFFICE O/P EST MOD 30 MIN: CPT | Mod: S$GLB,,, | Performed by: INTERNAL MEDICINE

## 2025-04-03 RX ORDER — GABAPENTIN 100 MG/1
100 CAPSULE ORAL 3 TIMES DAILY
Qty: 90 CAPSULE | Refills: 2 | Status: SHIPPED | OUTPATIENT
Start: 2025-04-03 | End: 2026-04-03

## 2025-04-03 RX ORDER — GABAPENTIN 300 MG/1
300 CAPSULE ORAL 3 TIMES DAILY
Qty: 90 CAPSULE | Refills: 11 | OUTPATIENT
Start: 2025-04-03 | End: 2026-04-03

## 2025-04-03 RX ORDER — GABAPENTIN 300 MG/1
300 CAPSULE ORAL 3 TIMES DAILY
Qty: 90 CAPSULE | Refills: 11 | Status: SHIPPED | OUTPATIENT
Start: 2025-04-03 | End: 2025-04-03

## 2025-04-03 RX ORDER — TRAMADOL HYDROCHLORIDE 50 MG/1
50 TABLET ORAL EVERY 6 HOURS PRN
Qty: 30 TABLET | Refills: 0 | Status: SHIPPED | OUTPATIENT
Start: 2025-04-03

## 2025-04-03 RX ORDER — DEXAMETHASONE 0.5 MG/5ML
1 ELIXIR ORAL EVERY 12 HOURS
Qty: 200 ML | Refills: 0 | Status: SHIPPED | OUTPATIENT
Start: 2025-04-03 | End: 2025-04-13

## 2025-04-03 NOTE — PROGRESS NOTES
Service Date:  4/3/25    Chief Complaint:  Breast cancer     Essence Herrera is a 75 y.o. female here sooner than scheduled for bilateral lower extremity pain and mucositis.  Also recently went to the ER with shortness of breath and had a negative workup including a a CTA of the chest.  States that her legs have been hurting since the chemo.  Describes it as sharp, sometimes shooting down her legs.  Sometimes feels like ache.  Also has numbness in her feet.  Feels some pain in her back as well.  Also at last visit she was having mucositis which is now worse and not being helped by the magic mouthwash.    Review of Systems   Constitutional: Negative.  Negative for appetite change and unexpected weight change.   HENT:  Positive for mouth sores.    Eyes: Negative.  Negative for visual disturbance.   Respiratory: Negative.  Negative for cough and shortness of breath.    Cardiovascular: Negative.  Negative for chest pain.   Gastrointestinal: Negative.  Negative for abdominal pain and diarrhea.   Endocrine: Negative.    Genitourinary: Negative.  Negative for frequency.   Musculoskeletal: Negative.  Positive for leg pain. Negative for back pain.   Integumentary:  Negative for rash. Negative.   Neurological: Negative.  Negative for headaches.   Hematological: Negative.  Negative for adenopathy.   Psychiatric/Behavioral: Negative.  The patient is not nervous/anxious.         Current Outpatient Medications   Medication Instructions    atorvastatin (LIPITOR) 20 mg, Daily    dexAMETHasone (DECADRON) 8 mg, Oral, See admin instructions, Take 8 mg by mouth daily on days 2, 3 and 4 of chemotherapy cycle.    dexAMETHasone (DECADRON) 1 mg, Oral, Every 12 hours, Swish and spit    diphenhydrAMINE-aluminum-magnesium hydroxide-simethicone-LIDOcaine viscous HCl 2% 15 mLs, Swish & Spit, Every 4 hours PRN    gabapentin (NEURONTIN) 300 mg, Oral, 3 times daily    LIDOcaine-prilocaine (EMLA) cream Topical (Top), As needed (PRN)    magic  "mouthwash diphen/antac/lidoc Swish and spit 15 mLs every 4 (four) hours as needed.    MOUNJARO 2.5 mg, Weekly    OLANZapine (ZYPREXA) 5 mg, Oral, See admin instructions, Take 1 tablet by mouth nightly on days 1-4 of each chemotherapy cycle.    olmesartan-hydrochlorothiazide (BENICAR HCT) 20-12.5 mg per tablet 1 tablet, Daily    ondansetron (ZOFRAN-ODT) 8 mg, Oral, Every 8 hours PRN    prochlorperazine (COMPAZINE) 10 mg, Oral, Every 6 hours PRN    traZODone (DESYREL) 50 mg, Oral, Nightly        Past Medical History:   Diagnosis Date    Hyperlipidemia     Hypertension         Past Surgical History:   Procedure Laterality Date    BUNIONECTOMY Bilateral 1996    CARPAL TUNNEL RELEASE Bilateral 1998    DILATION AND CURETTAGE OF UTERUS      HYSTERECTOMY      TVH with conservation of the ovaries    INSERTION OF TUNNELED CENTRAL VENOUS CATHETER (CVC) WITH SUBCUTANEOUS PORT Right 12/16/2024    Procedure: [id number];  Surgeon: Juaquin Sanchez Jr., MD;  Location: [facility];  Service: General;  Laterality: Right;    ROTATOR CUFF REPAIR Right 2005    TUBAL LIGATION          Family History   Problem Relation Name Age of Onset    Breast cancer Maternal Grandfather      Breast cancer Maternal Aunt         Social History     Tobacco Use    Smoking status: Never    Smokeless tobacco: Never   Substance Use Topics    Alcohol use: Not Currently    Drug use: Never         Vitals:    04/03/25 0935   BP: (!) 110/55   Pulse: 95   Resp: 18   Temp: 97.1 °F (36.2 °C)        Physical Exam:  BP (!) 110/55 (BP Location: Right arm, Patient Position: Sitting)   Pulse 95   Temp 97.1 °F (36.2 °C) (Temporal)   Resp 18   Ht 5' 5" (1.651 m)   Wt 96.8 kg (213 lb 6.5 oz)   SpO2 100%   BMI 35.51 kg/m²     Physical Exam  Constitutional:       Appearance: Normal appearance.   HENT:      Head: Normocephalic and atraumatic.      Nose: Nose normal.      Mouth/Throat:      Mouth: Mucous membranes are moist.      Pharynx: Oropharynx is clear. "   Eyes:      Conjunctiva/sclera: Conjunctivae normal.   Cardiovascular:      Rate and Rhythm: Normal rate and regular rhythm.      Heart sounds: Normal heart sounds.   Pulmonary:      Effort: Pulmonary effort is normal.      Breath sounds: Normal breath sounds.   Abdominal:      General: Abdomen is flat. Bowel sounds are normal.      Palpations: Abdomen is soft.   Musculoskeletal:         General: Normal range of motion.      Cervical back: Normal range of motion and neck supple.   Skin:     General: Skin is warm and dry.   Neurological:      General: No focal deficit present.      Mental Status: She is alert and oriented to person, place, and time. Mental status is at baseline.   Psychiatric:         Mood and Affect: Mood normal.          Labs:  Lab Results   Component Value Date    WBC 26.77 (H) 04/01/2025    RBC 3.27 (L) 04/01/2025    HGB 10.1 (L) 04/01/2025    HCT 30.2 (L) 04/01/2025    MCV 92 04/01/2025    MCH 30.9 04/01/2025    MCHC 33.4 04/01/2025    RDW 15.9 (H) 04/01/2025     04/01/2025    MPV 11.4 04/01/2025    GRAN 2.3 12/16/2024    GRAN 44.3 12/16/2024    LYMPH 0.9 03/24/2025    MONO 5 03/24/2025    MONO 0.2 03/24/2025    EOS 0.0 03/24/2025    BASO 0.1 03/24/2025    EOSINOPHIL 0 03/24/2025    BASOPHIL 2 03/24/2025     Sodium   Date Value Ref Range Status   04/01/2025 135 (L) 136 - 145 mmol/L Final     Potassium   Date Value Ref Range Status   04/01/2025 3.6 3.5 - 5.1 mmol/L Final     Chloride   Date Value Ref Range Status   03/24/2025 98 96 - 106 mmol/L Final     CO2   Date Value Ref Range Status   04/01/2025 23 23 - 29 mmol/L Final     Glucose   Date Value Ref Range Status   03/24/2025 114 (H) 70 - 99 mg/dL Final     BUN   Date Value Ref Range Status   04/01/2025 17 8 - 23 mg/dL Final     Creatinine   Date Value Ref Range Status   04/01/2025 0.9 0.5 - 1.4 mg/dL Final     Calcium   Date Value Ref Range Status   04/01/2025 9.2 8.7 - 10.5 mg/dL Final     Total Protein   Date Value Ref Range Status    12/12/2024 7.0 6.0 - 8.4 g/dL Final     Albumin   Date Value Ref Range Status   03/24/2025 3.6 (L) 3.8 - 4.8 g/dL Final   12/12/2024 3.7 3.5 - 5.2 g/dL Final     Total Bilirubin   Date Value Ref Range Status   03/24/2025 0.4 0.0 - 1.2 mg/dL Final     Alkaline Phosphatase   Date Value Ref Range Status   12/12/2024 88 55 - 135 U/L Final     AST   Date Value Ref Range Status   03/24/2025 12 0 - 40 IU/L Final     ALT   Date Value Ref Range Status   03/24/2025 6 0 - 32 IU/L Final     Anion Gap   Date Value Ref Range Status   12/12/2024 6 (L) 8 - 16 mmol/L Final     eGFR if    Date Value Ref Range Status   03/02/2021 >60.0 >60 mL/min/1.73 m^2 Final     eGFR if non    Date Value Ref Range Status   03/02/2021 >60.0 >60 mL/min/1.73 m^2 Final     Comment:     Calculation used to obtain the estimated glomerular filtration  rate (eGFR) is the CKD-EPI equation.          A/P:    Malignant neoplasm of the right breast   -triple negative   -cT1c N0 M0   -on neoadjuvant ACT  -completed AC portion of treatment.  Now we will move on to Taxol.  -labs reviewed   -return as scheduled    BLE pain  -will give Rx for Tramadol and gabapentin    Mucositis   - magic mouthwash not really helping. Will give Rx for decadron liquid to swish and spit    Normal blood pressure on the low end   -normally runs hypertensive   -I asked her to cut her blood pressure medication with Benicar in half    Aurash Khoobehi, MD  Hematology and Oncology    Visit today included increased complexity associated with the care of the episodic problem breast cancer addressed and managing the longitudinal care of the patient due to the serious and/or complex managed problem(s).

## 2025-04-04 LAB
AORTIC ROOT ANNULUS: 2.7 CM
AORTIC VALVE CUSP SEPERATION: 1.8 CM
APICAL FOUR CHAMBER EJECTION FRACTION: 64 %
AV INDEX (PROSTH): 0.87
AV MEAN GRADIENT: 5 MMHG
AV PEAK GRADIENT: 9 MMHG
AV VALVE AREA BY VELOCITY RATIO: 2.6 CM²
AV VALVE AREA: 2.5 CM²
AV VELOCITY RATIO: 0.93
BSA FOR ECHO PROCEDURE: 2.1 M2
CV ECHO LV RWT: 0.5 CM
DOP CALC AO PEAK VEL: 1.5 M/S
DOP CALC AO VTI: 27 CM
DOP CALC LVOT AREA: 2.8 CM2
DOP CALC LVOT DIAMETER: 1.9 CM
DOP CALC LVOT PEAK VEL: 1.4 M/S
DOP CALC LVOT STROKE VOLUME: 66.3 CM3
DOP CALCLVOT PEAK VEL VTI: 23.4 CM
E WAVE DECELERATION TIME: 222 MSEC
E/A RATIO: 0.49
E/E' RATIO: 4 M/S
ECHO LV POSTERIOR WALL: 0.9 CM (ref 0.6–1.1)
FRACTIONAL SHORTENING: 36.1 % (ref 28–44)
INTERVENTRICULAR SEPTUM: 1 CM (ref 0.6–1.1)
IVRT: 106 MSEC
LEFT ATRIUM SIZE: 3.2 CM
LEFT INTERNAL DIMENSION IN SYSTOLE: 2.3 CM (ref 2.1–4)
LEFT VENTRICLE DIASTOLIC VOLUME INDEX: 26.6 ML/M2
LEFT VENTRICLE DIASTOLIC VOLUME: 54 ML
LEFT VENTRICLE END DIASTOLIC VOLUME APICAL 4 CHAMBER: 59.2 ML
LEFT VENTRICLE MASS INDEX: 49.4 G/M2
LEFT VENTRICLE SYSTOLIC VOLUME INDEX: 8.9 ML/M2
LEFT VENTRICLE SYSTOLIC VOLUME: 18 ML
LEFT VENTRICULAR INTERNAL DIMENSION IN DIASTOLE: 3.6 CM (ref 3.5–6)
LEFT VENTRICULAR MASS: 100.2 G
LV LATERAL E/E' RATIO: 3.5 M/S
LV SEPTAL E/E' RATIO: 5.8 M/S
LVED V (TEICH): 54.4 ML
LVES V (TEICH): 17.7 ML
LVOT MG: 4 MMHG
LVOT MV: 0.85 CM/S
MV PEAK A VEL: 0.93 M/S
MV PEAK E VEL: 0.46 M/S
MV STENOSIS PRESSURE HALF TIME: 61 MS
MV VALVE AREA P 1/2 METHOD: 3.61 CM2
OHS CV RV/LV RATIO: 0.61 CM
PISA TR MAX VEL: 2.3 M/S
RA PRESSURE ESTIMATED: 3 MMHG
RIGHT VENTRICLE DIASTOLIC BASEL DIMENSION: 2.2 CM
RIGHT VENTRICULAR END-DIASTOLIC DIMENSION: 2.2 CM
RV TB RVSP: 5 MMHG
TDI LATERAL: 0.13 M/S
TDI SEPTAL: 0.08 M/S
TDI: 0.11 M/S
TR MAX PG: 21 MMHG
TV REST PULMONARY ARTERY PRESSURE: 24 MMHG
Z-SCORE OF LEFT VENTRICULAR DIMENSION IN END DIASTOLE: -5.13
Z-SCORE OF LEFT VENTRICULAR DIMENSION IN END SYSTOLE: -3.73

## 2025-04-07 DIAGNOSIS — C50.211 MALIGNANT NEOPLASM OF UPPER-INNER QUADRANT OF RIGHT BREAST IN FEMALE, ESTROGEN RECEPTOR NEGATIVE: Primary | ICD-10-CM

## 2025-04-07 DIAGNOSIS — Z17.1 MALIGNANT NEOPLASM OF UPPER-INNER QUADRANT OF RIGHT BREAST IN FEMALE, ESTROGEN RECEPTOR NEGATIVE: Primary | ICD-10-CM

## 2025-04-08 LAB
ALBUMIN SERPL-MCNC: 3.6 G/DL (ref 3.8–4.8)
ALP SERPL-CCNC: 170 IU/L (ref 44–121)
ALT SERPL-CCNC: 15 IU/L (ref 0–32)
AST SERPL-CCNC: 19 IU/L (ref 0–40)
BASOPHILS # BLD AUTO: 0.3 X10E3/UL (ref 0–0.2)
BASOPHILS NFR BLD AUTO: 1 %
BILIRUB SERPL-MCNC: 0.2 MG/DL (ref 0–1.2)
BUN SERPL-MCNC: 6 MG/DL (ref 8–27)
BUN/CREAT SERPL: 7 (ref 12–28)
CALCIUM SERPL-MCNC: 9.2 MG/DL (ref 8.7–10.3)
CHLORIDE SERPL-SCNC: 103 MMOL/L (ref 96–106)
CO2 SERPL-SCNC: 20 MMOL/L (ref 20–29)
CREAT SERPL-MCNC: 0.82 MG/DL (ref 0.57–1)
EOSINOPHIL # BLD AUTO: 0 X10E3/UL (ref 0–0.4)
EOSINOPHIL NFR BLD AUTO: 0 %
ERYTHROCYTE [DISTWIDTH] IN BLOOD BY AUTOMATED COUNT: 16.7 % (ref 11.7–15.4)
EST. GFR  (NO RACE VARIABLE): 75 ML/MIN/1.73
GLOBULIN SER CALC-MCNC: 2.3 G/DL (ref 1.5–4.5)
GLUCOSE SERPL-MCNC: 99 MG/DL (ref 70–99)
HCT VFR BLD AUTO: 33.2 % (ref 34–46.6)
HGB BLD-MCNC: 10.6 G/DL (ref 11.1–15.9)
LC IMMATURE CELLS: ABNORMAL
LYMPHOCYTES # BLD AUTO: 0.6 X10E3/UL (ref 0.7–3.1)
LYMPHOCYTES NFR BLD AUTO: 2 %
MAGNESIUM SERPL-MCNC: 2.3 MG/DL (ref 1.6–2.3)
MCH RBC QN AUTO: 30.7 PG (ref 26.6–33)
MCHC RBC AUTO-ENTMCNC: 31.9 G/DL (ref 31.5–35.7)
MCV RBC AUTO: 96 FL (ref 79–97)
METAMYELOCYTES NFR BLD: 1 % (ref 0–0)
MONOCYTES # BLD AUTO: 2.2 X10E3/UL (ref 0.1–0.9)
MONOCYTES NFR BLD AUTO: 7 %
MORPHOLOGY BLD-IMP: ABNORMAL
MYELOCYTES NFR BLD: 2 % (ref 0–0)
NEUTROPHILS # BLD AUTO: 27.3 X10E3/UL (ref 1.4–7)
NEUTROPHILS NFR BLD AUTO: 83 %
NEUTS BAND NFR BLD AUTO: 4 %
NRBC BLD AUTO-RTO: 4 % (ref 0–0)
PLATELET # BLD AUTO: 246 X10E3/UL (ref 150–450)
POTASSIUM SERPL-SCNC: 4.2 MMOL/L (ref 3.5–5.2)
PROT SERPL-MCNC: 5.9 G/DL (ref 6–8.5)
RBC # BLD AUTO: 3.45 X10E6/UL (ref 3.77–5.28)
SODIUM SERPL-SCNC: 139 MMOL/L (ref 134–144)
WBC # BLD AUTO: 31.4 X10E3/UL (ref 3.4–10.8)

## 2025-04-09 ENCOUNTER — OFFICE VISIT (OUTPATIENT)
Dept: HEMATOLOGY/ONCOLOGY | Facility: CLINIC | Age: 75
End: 2025-04-09
Payer: MEDICARE

## 2025-04-09 VITALS
RESPIRATION RATE: 18 BRPM | SYSTOLIC BLOOD PRESSURE: 117 MMHG | WEIGHT: 207.44 LBS | HEIGHT: 65 IN | BODY MASS INDEX: 34.56 KG/M2 | DIASTOLIC BLOOD PRESSURE: 59 MMHG | HEART RATE: 92 BPM | OXYGEN SATURATION: 99 % | TEMPERATURE: 97 F

## 2025-04-09 DIAGNOSIS — I10 ESSENTIAL HYPERTENSION: ICD-10-CM

## 2025-04-09 DIAGNOSIS — K12.30 MUCOSITIS: ICD-10-CM

## 2025-04-09 DIAGNOSIS — Z17.1 MALIGNANT NEOPLASM OF UPPER-INNER QUADRANT OF RIGHT BREAST IN FEMALE, ESTROGEN RECEPTOR NEGATIVE: Primary | ICD-10-CM

## 2025-04-09 DIAGNOSIS — G62.9 NEUROPATHY: ICD-10-CM

## 2025-04-09 DIAGNOSIS — C50.211 MALIGNANT NEOPLASM OF UPPER-INNER QUADRANT OF RIGHT BREAST IN FEMALE, ESTROGEN RECEPTOR NEGATIVE: Primary | ICD-10-CM

## 2025-04-09 PROCEDURE — 1101F PT FALLS ASSESS-DOCD LE1/YR: CPT | Mod: CPTII,S$GLB,, | Performed by: INTERNAL MEDICINE

## 2025-04-09 PROCEDURE — 1126F AMNT PAIN NOTED NONE PRSNT: CPT | Mod: CPTII,S$GLB,, | Performed by: INTERNAL MEDICINE

## 2025-04-09 PROCEDURE — 3074F SYST BP LT 130 MM HG: CPT | Mod: CPTII,S$GLB,, | Performed by: INTERNAL MEDICINE

## 2025-04-09 PROCEDURE — G2211 COMPLEX E/M VISIT ADD ON: HCPCS | Mod: S$GLB,,, | Performed by: INTERNAL MEDICINE

## 2025-04-09 PROCEDURE — 99999 PR PBB SHADOW E&M-EST. PATIENT-LVL IV: CPT | Mod: PBBFAC,,, | Performed by: INTERNAL MEDICINE

## 2025-04-09 PROCEDURE — 3288F FALL RISK ASSESSMENT DOCD: CPT | Mod: CPTII,S$GLB,, | Performed by: INTERNAL MEDICINE

## 2025-04-09 PROCEDURE — 1159F MED LIST DOCD IN RCRD: CPT | Mod: CPTII,S$GLB,, | Performed by: INTERNAL MEDICINE

## 2025-04-09 PROCEDURE — 3078F DIAST BP <80 MM HG: CPT | Mod: CPTII,S$GLB,, | Performed by: INTERNAL MEDICINE

## 2025-04-09 PROCEDURE — 99215 OFFICE O/P EST HI 40 MIN: CPT | Mod: S$GLB,,, | Performed by: INTERNAL MEDICINE

## 2025-04-09 RX ORDER — FAMOTIDINE 10 MG/ML
20 INJECTION, SOLUTION INTRAVENOUS
Status: CANCELLED | OUTPATIENT
Start: 2025-04-10

## 2025-04-09 RX ORDER — SODIUM CHLORIDE 0.9 % (FLUSH) 0.9 %
10 SYRINGE (ML) INJECTION
Status: CANCELLED | OUTPATIENT
Start: 2025-04-10

## 2025-04-09 RX ORDER — DIPHENHYDRAMINE HYDROCHLORIDE 50 MG/ML
50 INJECTION, SOLUTION INTRAMUSCULAR; INTRAVENOUS ONCE AS NEEDED
Status: CANCELLED | OUTPATIENT
Start: 2025-04-10

## 2025-04-09 RX ORDER — EPINEPHRINE 0.3 MG/.3ML
0.3 INJECTION SUBCUTANEOUS ONCE AS NEEDED
Status: CANCELLED | OUTPATIENT
Start: 2025-04-10

## 2025-04-09 RX ORDER — HEPARIN 100 UNIT/ML
500 SYRINGE INTRAVENOUS
Status: CANCELLED | OUTPATIENT
Start: 2025-04-10

## 2025-04-09 NOTE — PROGRESS NOTES
Service Date:  4/9/25    Chief Complaint:  Breast cancer     Essence Herrera is a 75 y.o. female here for chemotherapy clearance.  Currently on Taxol portion of ACT.  When I walked in the room she stated that she felt very tired.  Felt that she does not know if she can complete all this chemotherapy as she just feels run down.  She still has the mucositis although she just picked up the Decadron solution yesterday.  She does feel like her leg pain and neuropathy have gotten better with gabapentin over the past week.  She has difficulty with eating as the taste in her mouth has change the taste of food for her.  This seems to be a big detriment to her, understandably.    Review of Systems   Constitutional: Negative.  Negative for appetite change and unexpected weight change.   HENT:  Positive for mouth sores.    Eyes: Negative.  Negative for visual disturbance.   Respiratory: Negative.  Negative for cough and shortness of breath.    Cardiovascular: Negative.  Negative for chest pain.   Gastrointestinal: Negative.  Negative for abdominal pain and diarrhea.   Endocrine: Negative.    Genitourinary: Negative.  Negative for frequency.   Musculoskeletal: Negative.  Positive for leg pain. Negative for back pain.   Integumentary:  Negative for rash. Negative.   Neurological: Negative.  Negative for headaches.   Hematological: Negative.  Negative for adenopathy.   Psychiatric/Behavioral: Negative.  The patient is not nervous/anxious.         Current Outpatient Medications   Medication Instructions    atorvastatin (LIPITOR) 20 mg, Daily    dexAMETHasone (DECADRON) 8 mg, Oral, See admin instructions, Take 8 mg by mouth daily on days 2, 3 and 4 of chemotherapy cycle.    dexAMETHasone (DECADRON) 1 mg, Oral, Every 12 hours, Swish and spit    diphenhydrAMINE-aluminum-magnesium hydroxide-simethicone-LIDOcaine viscous HCl 2% 15 mLs, Swish & Spit, Every 4 hours PRN    gabapentin (NEURONTIN) 100 mg, Oral, 3 times daily     "LIDOcaine-prilocaine (EMLA) cream Topical (Top), As needed (PRN)    magic mouthwash diphen/antac/lidoc Swish and spit 15 mLs every 4 (four) hours as needed.    MOUNJARO 2.5 mg, Weekly    OLANZapine (ZYPREXA) 5 mg, Oral, See admin instructions, Take 1 tablet by mouth nightly on days 1-4 of each chemotherapy cycle.    olmesartan-hydrochlorothiazide (BENICAR HCT) 20-12.5 mg per tablet 1 tablet, Daily    ondansetron (ZOFRAN-ODT) 8 mg, Oral, Every 8 hours PRN    prochlorperazine (COMPAZINE) 10 mg, Oral, Every 6 hours PRN    traMADoL (ULTRAM) 50 mg, Oral, Every 6 hours PRN    traZODone (DESYREL) 50 mg, Oral, Nightly        Past Medical History:   Diagnosis Date    Hyperlipidemia     Hypertension         Past Surgical History:   Procedure Laterality Date    BUNIONECTOMY Bilateral 1996    CARPAL TUNNEL RELEASE Bilateral 1998    DILATION AND CURETTAGE OF UTERUS      HYSTERECTOMY      TVH with conservation of the ovaries    INSERTION OF TUNNELED CENTRAL VENOUS CATHETER (CVC) WITH SUBCUTANEOUS PORT Right 12/16/2024    Procedure: [id number];  Surgeon: Juaquin Sanchez Jr., MD;  Location: [facility] OR;  Service: General;  Laterality: Right;    ROTATOR CUFF REPAIR Right 2005    TUBAL LIGATION          Family History   Problem Relation Name Age of Onset    Breast cancer Maternal Grandfather      Breast cancer Maternal Aunt         Social History     Tobacco Use    Smoking status: Never    Smokeless tobacco: Never   Substance Use Topics    Alcohol use: Not Currently    Drug use: Never         Vitals:    04/09/25 1113   BP: (!) 117/59   Pulse: 92   Resp: 18   Temp: 97.2 °F (36.2 °C)        Physical Exam:  BP (!) 117/59 (BP Location: Right arm, Patient Position: Sitting)   Pulse 92   Temp 97.2 °F (36.2 °C) (Temporal)   Resp 18   Ht 5' 5" (1.651 m)   Wt 94.1 kg (207 lb 7.3 oz)   SpO2 99%   BMI 34.52 kg/m²     Physical Exam  Constitutional:       Appearance: Normal appearance.   HENT:      Head: Normocephalic and " atraumatic.      Nose: Nose normal.      Mouth/Throat:      Mouth: Mucous membranes are moist.      Pharynx: Oropharynx is clear.   Eyes:      Conjunctiva/sclera: Conjunctivae normal.   Cardiovascular:      Rate and Rhythm: Normal rate and regular rhythm.      Heart sounds: Normal heart sounds.   Pulmonary:      Effort: Pulmonary effort is normal.      Breath sounds: Normal breath sounds.   Abdominal:      General: Abdomen is flat. Bowel sounds are normal.      Palpations: Abdomen is soft.   Musculoskeletal:         General: Normal range of motion.      Cervical back: Normal range of motion and neck supple.   Skin:     General: Skin is warm and dry.   Neurological:      General: No focal deficit present.      Mental Status: She is alert and oriented to person, place, and time. Mental status is at baseline.   Psychiatric:         Mood and Affect: Mood normal.          Labs:  Lab Results   Component Value Date    WBC 31.4 (HH) 04/07/2025    RBC 3.45 (L) 04/07/2025    HGB 10.6 (L) 04/07/2025    HCT 33.2 (L) 04/07/2025    MCV 96 04/07/2025    MCH 30.7 04/07/2025    MCHC 31.9 04/07/2025    RDW 16.7 (H) 04/07/2025     04/07/2025    MPV 11.4 04/01/2025    GRAN 2.3 12/16/2024    GRAN 44.3 12/16/2024    LYMPH 0.6 (L) 04/07/2025    MONO 7 04/07/2025    MONO 2.2 (H) 04/07/2025    EOS 0.0 04/07/2025    BASO 0.3 (H) 04/07/2025    EOSINOPHIL 0 04/07/2025    BASOPHIL 1 04/07/2025     Sodium   Date Value Ref Range Status   04/07/2025 139 134 - 144 mmol/L Final   04/01/2025 135 (L) 136 - 145 mmol/L Final     Potassium   Date Value Ref Range Status   04/07/2025 4.2 3.5 - 5.2 mmol/L Final   04/01/2025 3.6 3.5 - 5.1 mmol/L Final     Chloride   Date Value Ref Range Status   04/07/2025 103 96 - 106 mmol/L Final     CO2   Date Value Ref Range Status   04/07/2025 20 20 - 29 mmol/L Final   04/01/2025 23 23 - 29 mmol/L Final     Glucose   Date Value Ref Range Status   04/07/2025 99 70 - 99 mg/dL Final     BUN   Date Value Ref Range  Status   04/07/2025 6 (L) 8 - 27 mg/dL Final   04/01/2025 17 8 - 23 mg/dL Final     Creatinine   Date Value Ref Range Status   04/07/2025 0.82 0.57 - 1.00 mg/dL Final   04/01/2025 0.9 0.5 - 1.4 mg/dL Final     Calcium   Date Value Ref Range Status   04/07/2025 9.2 8.7 - 10.3 mg/dL Final   04/01/2025 9.2 8.7 - 10.5 mg/dL Final     Total Protein   Date Value Ref Range Status   12/12/2024 7.0 6.0 - 8.4 g/dL Final     Albumin   Date Value Ref Range Status   04/07/2025 3.6 (L) 3.8 - 4.8 g/dL Final   12/12/2024 3.7 3.5 - 5.2 g/dL Final     Total Bilirubin   Date Value Ref Range Status   04/07/2025 0.2 0.0 - 1.2 mg/dL Final     Alkaline Phosphatase   Date Value Ref Range Status   12/12/2024 88 55 - 135 U/L Final     AST   Date Value Ref Range Status   04/07/2025 19 0 - 40 IU/L Final     ALT   Date Value Ref Range Status   04/07/2025 15 0 - 32 IU/L Final     Anion Gap   Date Value Ref Range Status   12/12/2024 6 (L) 8 - 16 mmol/L Final     eGFR if    Date Value Ref Range Status   03/02/2021 >60.0 >60 mL/min/1.73 m^2 Final     eGFR if non    Date Value Ref Range Status   03/02/2021 >60.0 >60 mL/min/1.73 m^2 Final     Comment:     Calculation used to obtain the estimated glomerular filtration  rate (eGFR) is the CKD-EPI equation.          A/P:    Malignant neoplasm of the right breast   -triple negative   -cT1c N0 M0   -on neoadjuvant ACT  -completed AC portion of treatment.  Now moved on to Taxol.  -I will proceed with Taxol cycle 2 (6/8 total)  -I will reduce the dose by about 10% given overall fatigue, poor tolerance and neuropathy.    BLE pain/neuropathy  -on Rx Tramadol and gabapentin  -gabapentin seems to help    Mucositis   - magic mouthwash not really helping.  Given Rx for decadron liquid to swish and spit, which she just picked up yesterday.    Normal blood pressure on the low end   -normally runs hypertensive   -stopped taking Benicar    Aurash Khoobehi, MD  Hematology and  Oncology    Visit today included increased complexity associated with the care of the episodic problem breast cancer addressed and managing the longitudinal care of the patient due to the serious and/or complex managed problem(s).

## 2025-04-10 ENCOUNTER — INFUSION (OUTPATIENT)
Dept: INFUSION THERAPY | Facility: HOSPITAL | Age: 75
End: 2025-04-10
Attending: INTERNAL MEDICINE
Payer: MEDICARE

## 2025-04-10 VITALS — BODY MASS INDEX: 34.32 KG/M2 | WEIGHT: 206 LBS | HEIGHT: 65 IN | TEMPERATURE: 97 F

## 2025-04-10 DIAGNOSIS — C50.211 MALIGNANT NEOPLASM OF UPPER-INNER QUADRANT OF RIGHT BREAST IN FEMALE, ESTROGEN RECEPTOR NEGATIVE: Primary | ICD-10-CM

## 2025-04-10 DIAGNOSIS — Z17.1 MALIGNANT NEOPLASM OF UPPER-INNER QUADRANT OF RIGHT BREAST IN FEMALE, ESTROGEN RECEPTOR NEGATIVE: Primary | ICD-10-CM

## 2025-04-10 PROCEDURE — 96367 TX/PROPH/DG ADDL SEQ IV INF: CPT

## 2025-04-10 PROCEDURE — 96375 TX/PRO/DX INJ NEW DRUG ADDON: CPT

## 2025-04-10 PROCEDURE — 25000003 PHARM REV CODE 250: Performed by: INTERNAL MEDICINE

## 2025-04-10 PROCEDURE — 96415 CHEMO IV INFUSION ADDL HR: CPT

## 2025-04-10 PROCEDURE — 96413 CHEMO IV INFUSION 1 HR: CPT

## 2025-04-10 PROCEDURE — 63600175 PHARM REV CODE 636 W HCPCS: Performed by: INTERNAL MEDICINE

## 2025-04-10 RX ORDER — EPINEPHRINE 0.3 MG/.3ML
0.3 INJECTION SUBCUTANEOUS ONCE AS NEEDED
Status: DISCONTINUED | OUTPATIENT
Start: 2025-04-10 | End: 2025-04-10 | Stop reason: HOSPADM

## 2025-04-10 RX ORDER — HEPARIN 100 UNIT/ML
500 SYRINGE INTRAVENOUS
Status: DISCONTINUED | OUTPATIENT
Start: 2025-04-10 | End: 2025-04-10 | Stop reason: HOSPADM

## 2025-04-10 RX ORDER — SODIUM CHLORIDE 0.9 % (FLUSH) 0.9 %
10 SYRINGE (ML) INJECTION
Status: DISCONTINUED | OUTPATIENT
Start: 2025-04-10 | End: 2025-04-10 | Stop reason: HOSPADM

## 2025-04-10 RX ORDER — DIPHENHYDRAMINE HYDROCHLORIDE 50 MG/ML
50 INJECTION, SOLUTION INTRAMUSCULAR; INTRAVENOUS ONCE AS NEEDED
Status: DISCONTINUED | OUTPATIENT
Start: 2025-04-10 | End: 2025-04-10 | Stop reason: HOSPADM

## 2025-04-10 RX ORDER — FAMOTIDINE 10 MG/ML
20 INJECTION, SOLUTION INTRAVENOUS
Status: COMPLETED | OUTPATIENT
Start: 2025-04-10 | End: 2025-04-10

## 2025-04-10 RX ADMIN — SODIUM CHLORIDE 50 MG: 9 INJECTION, SOLUTION INTRAVENOUS at 09:04

## 2025-04-10 RX ADMIN — FOSAPREPITANT 150 MG: 150 INJECTION, POWDER, LYOPHILIZED, FOR SOLUTION INTRAVENOUS at 10:04

## 2025-04-10 RX ADMIN — FAMOTIDINE 20 MG: 10 INJECTION INTRAVENOUS at 09:04

## 2025-04-10 RX ADMIN — HEPARIN 500 UNITS: 100 SYRINGE at 01:04

## 2025-04-10 RX ADMIN — SODIUM CHLORIDE: 9 INJECTION, SOLUTION INTRAVENOUS at 09:04

## 2025-04-10 RX ADMIN — PACLITAXEL 324 MG: 6 INJECTION INTRAVENOUS at 10:04

## 2025-04-10 RX ADMIN — SODIUM CHLORIDE: 9 INJECTION, SOLUTION INTRAVENOUS at 11:04

## 2025-04-10 RX ADMIN — SODIUM CHLORIDE 20 MG: 9 INJECTION, SOLUTION INTRAVENOUS at 09:04

## 2025-04-10 NOTE — PLAN OF CARE
Problem: Fatigue  Goal: Improved Activity Tolerance  Outcome: Progressing  Intervention: Promote Improved Energy  Flowsheets (Taken 4/10/2025 5390)  Fatigue Management: paced activity encouraged  Sleep/Rest Enhancement: regular sleep/rest pattern promoted  Activity Management: Ambulated -L4  Environmental Support: environmental consistency promoted

## 2025-04-23 ENCOUNTER — OFFICE VISIT (OUTPATIENT)
Dept: HEMATOLOGY/ONCOLOGY | Facility: CLINIC | Age: 75
End: 2025-04-23
Payer: MEDICARE

## 2025-04-23 VITALS
SYSTOLIC BLOOD PRESSURE: 150 MMHG | RESPIRATION RATE: 17 BRPM | HEART RATE: 86 BPM | WEIGHT: 198.88 LBS | TEMPERATURE: 97 F | BODY MASS INDEX: 33.13 KG/M2 | DIASTOLIC BLOOD PRESSURE: 75 MMHG | HEIGHT: 65 IN

## 2025-04-23 DIAGNOSIS — Z17.1 MALIGNANT NEOPLASM OF UPPER-INNER QUADRANT OF RIGHT BREAST IN FEMALE, ESTROGEN RECEPTOR NEGATIVE: Primary | ICD-10-CM

## 2025-04-23 DIAGNOSIS — D64.81 ANTINEOPLASTIC CHEMOTHERAPY INDUCED ANEMIA: ICD-10-CM

## 2025-04-23 DIAGNOSIS — T45.1X5A ANTINEOPLASTIC CHEMOTHERAPY INDUCED ANEMIA: ICD-10-CM

## 2025-04-23 DIAGNOSIS — C50.211 MALIGNANT NEOPLASM OF UPPER-INNER QUADRANT OF RIGHT BREAST IN FEMALE, ESTROGEN RECEPTOR NEGATIVE: Primary | ICD-10-CM

## 2025-04-23 DIAGNOSIS — K12.30 MUCOSITIS: ICD-10-CM

## 2025-04-23 DIAGNOSIS — G62.9 NEUROPATHY: ICD-10-CM

## 2025-04-23 PROCEDURE — 1126F AMNT PAIN NOTED NONE PRSNT: CPT | Mod: CPTII,S$GLB,, | Performed by: INTERNAL MEDICINE

## 2025-04-23 PROCEDURE — 1159F MED LIST DOCD IN RCRD: CPT | Mod: CPTII,S$GLB,, | Performed by: INTERNAL MEDICINE

## 2025-04-23 PROCEDURE — 3288F FALL RISK ASSESSMENT DOCD: CPT | Mod: CPTII,S$GLB,, | Performed by: INTERNAL MEDICINE

## 2025-04-23 PROCEDURE — 1101F PT FALLS ASSESS-DOCD LE1/YR: CPT | Mod: CPTII,S$GLB,, | Performed by: INTERNAL MEDICINE

## 2025-04-23 PROCEDURE — 3077F SYST BP >= 140 MM HG: CPT | Mod: CPTII,S$GLB,, | Performed by: INTERNAL MEDICINE

## 2025-04-23 PROCEDURE — 1160F RVW MEDS BY RX/DR IN RCRD: CPT | Mod: CPTII,S$GLB,, | Performed by: INTERNAL MEDICINE

## 2025-04-23 PROCEDURE — 99999 PR PBB SHADOW E&M-EST. PATIENT-LVL V: CPT | Mod: PBBFAC,,, | Performed by: INTERNAL MEDICINE

## 2025-04-23 PROCEDURE — 3078F DIAST BP <80 MM HG: CPT | Mod: CPTII,S$GLB,, | Performed by: INTERNAL MEDICINE

## 2025-04-23 PROCEDURE — G2211 COMPLEX E/M VISIT ADD ON: HCPCS | Mod: S$GLB,,, | Performed by: INTERNAL MEDICINE

## 2025-04-23 PROCEDURE — 99215 OFFICE O/P EST HI 40 MIN: CPT | Mod: S$GLB,,, | Performed by: INTERNAL MEDICINE

## 2025-04-23 RX ORDER — HEPARIN 100 UNIT/ML
500 SYRINGE INTRAVENOUS
Status: CANCELLED | OUTPATIENT
Start: 2025-04-24

## 2025-04-23 RX ORDER — DIPHENHYDRAMINE HYDROCHLORIDE 50 MG/ML
50 INJECTION, SOLUTION INTRAMUSCULAR; INTRAVENOUS ONCE AS NEEDED
Status: CANCELLED | OUTPATIENT
Start: 2025-04-24

## 2025-04-23 RX ORDER — SODIUM CHLORIDE 0.9 % (FLUSH) 0.9 %
10 SYRINGE (ML) INJECTION
Status: CANCELLED | OUTPATIENT
Start: 2025-04-24

## 2025-04-23 RX ORDER — EPINEPHRINE 0.3 MG/.3ML
0.3 INJECTION SUBCUTANEOUS ONCE AS NEEDED
Status: CANCELLED | OUTPATIENT
Start: 2025-04-24

## 2025-04-23 RX ORDER — FAMOTIDINE 10 MG/ML
20 INJECTION, SOLUTION INTRAVENOUS
Status: CANCELLED | OUTPATIENT
Start: 2025-04-24

## 2025-04-23 NOTE — PROGRESS NOTES
Service Date:  4/23/25    Chief Complaint:  Breast cancer     Essence Herrera is a 75 y.o. female here for chemotherapy clearance.  Currently on Taxol portion of ACT.  Still feels tired.  I did reduce the dose of chemotherapy last visit but she is still having fatigue.  Just feels general weakness.  Also has loss of taste.  Mucositis is resolving.  Neuropathy seems to be improving.    Review of Systems   Constitutional:  Positive for fatigue. Negative for appetite change and unexpected weight change.   HENT:  Negative for mouth sores.    Eyes: Negative.  Negative for visual disturbance.   Respiratory: Negative.  Negative for cough and shortness of breath.    Cardiovascular: Negative.  Negative for chest pain.   Gastrointestinal: Negative.  Negative for abdominal pain and diarrhea.   Endocrine: Negative.    Genitourinary: Negative.  Negative for frequency.   Musculoskeletal: Negative.  Positive for leg pain. Negative for back pain.   Integumentary:  Negative for rash. Negative.   Neurological: Negative.  Negative for headaches.   Hematological: Negative.  Negative for adenopathy.   Psychiatric/Behavioral: Negative.  The patient is not nervous/anxious.         Current Outpatient Medications   Medication Instructions    atorvastatin (LIPITOR) 20 mg, Daily    dexAMETHasone (DECADRON) 8 mg, Oral, See admin instructions, Take 8 mg by mouth daily on days 2, 3 and 4 of chemotherapy cycle.    diphenhydrAMINE-aluminum-magnesium hydroxide-simethicone-LIDOcaine viscous HCl 2% 15 mLs, Swish & Spit, Every 4 hours PRN    gabapentin (NEURONTIN) 100 mg, Oral, 3 times daily    LIDOcaine-prilocaine (EMLA) cream Topical (Top), As needed (PRN)    magic mouthwash diphen/antac/lidoc Swish and spit 15 mLs every 4 (four) hours as needed.    MOUNJARO 2.5 mg, Weekly    OLANZapine (ZYPREXA) 5 mg, Oral, See admin instructions, Take 1 tablet by mouth nightly on days 1-4 of each chemotherapy cycle.    olmesartan-hydrochlorothiazide (BENICAR HCT)  "20-12.5 mg per tablet 1 tablet, Daily    ondansetron (ZOFRAN-ODT) 8 mg, Oral, Every 8 hours PRN    prochlorperazine (COMPAZINE) 10 mg, Oral, Every 6 hours PRN    traMADoL (ULTRAM) 50 mg, Oral, Every 6 hours PRN    traZODone (DESYREL) 50 mg, Oral, Nightly        Past Medical History:   Diagnosis Date    Hyperlipidemia     Hypertension         Past Surgical History:   Procedure Laterality Date    BUNIONECTOMY Bilateral 1996    CARPAL TUNNEL RELEASE Bilateral 1998    DILATION AND CURETTAGE OF UTERUS      HYSTERECTOMY      TVH with conservation of the ovaries    INSERTION OF TUNNELED CENTRAL VENOUS CATHETER (CVC) WITH SUBCUTANEOUS PORT Right 12/16/2024    Procedure: [id number];  Surgeon: Juaquin Sanchez Jr., MD;  Location: [facility] OR;  Service: General;  Laterality: Right;    ROTATOR CUFF REPAIR Right 2005    TUBAL LIGATION          Family History   Problem Relation Name Age of Onset    Breast cancer Maternal Grandfather      Breast cancer Maternal Aunt         Social History     Tobacco Use    Smoking status: Never    Smokeless tobacco: Never   Substance Use Topics    Alcohol use: Not Currently    Drug use: Never         Vitals:    04/23/25 0949   BP: (!) 150/75   Pulse: 86   Resp: 17   Temp: 97 °F (36.1 °C)        Physical Exam:  BP (!) 150/75 (BP Location: Left arm, Patient Position: Sitting)   Pulse 86   Temp 97 °F (36.1 °C) (Temporal)   Resp 17   Ht 5' 5" (1.651 m)   Wt 90.2 kg (198 lb 13.7 oz)   BMI 33.09 kg/m²     Physical Exam  Constitutional:       Appearance: Normal appearance.   HENT:      Head: Normocephalic and atraumatic.      Nose: Nose normal.      Mouth/Throat:      Mouth: Mucous membranes are moist.      Pharynx: Oropharynx is clear.   Eyes:      Conjunctiva/sclera: Conjunctivae normal.   Cardiovascular:      Rate and Rhythm: Normal rate and regular rhythm.      Heart sounds: Normal heart sounds.   Pulmonary:      Effort: Pulmonary effort is normal.      Breath sounds: Normal breath " sounds.   Abdominal:      General: Abdomen is flat. Bowel sounds are normal.      Palpations: Abdomen is soft.   Musculoskeletal:         General: Normal range of motion.      Cervical back: Normal range of motion and neck supple.   Skin:     General: Skin is warm and dry.   Neurological:      General: No focal deficit present.      Mental Status: She is alert and oriented to person, place, and time. Mental status is at baseline.   Psychiatric:         Mood and Affect: Mood normal.          Labs:  Lab Results   Component Value Date    WBC 4.4 04/21/2025    RBC 3.03 (L) 04/21/2025    HGB 9.6 (L) 04/21/2025    HCT 29.1 (L) 04/21/2025    MCV 96 04/21/2025    MCH 31.7 04/21/2025    MCHC 33.0 04/21/2025    RDW 17.4 (H) 04/21/2025     04/21/2025    MPV 11.4 04/01/2025    GRAN 2.3 12/16/2024    GRAN 44.3 12/16/2024    LYMPH 0.4 (L) 04/21/2025    MONO 9 04/21/2025    MONO 0.4 04/21/2025    EOS 0.2 04/21/2025    BASO 0.0 04/21/2025    EOSINOPHIL 3 04/21/2025    BASOPHIL 1 04/21/2025     Sodium   Date Value Ref Range Status   04/21/2025 137 134 - 144 mmol/L Final   04/01/2025 135 (L) 136 - 145 mmol/L Final     Potassium   Date Value Ref Range Status   04/21/2025 4.3 3.5 - 5.2 mmol/L Final   04/01/2025 3.6 3.5 - 5.1 mmol/L Final     Chloride   Date Value Ref Range Status   04/21/2025 101 96 - 106 mmol/L Final     CO2   Date Value Ref Range Status   04/21/2025 20 20 - 29 mmol/L Final   04/01/2025 23 23 - 29 mmol/L Final     Glucose   Date Value Ref Range Status   04/21/2025 112 (H) 70 - 99 mg/dL Final     BUN   Date Value Ref Range Status   04/21/2025 12 8 - 27 mg/dL Final   04/01/2025 17 8 - 23 mg/dL Final     Creatinine   Date Value Ref Range Status   04/21/2025 0.87 0.57 - 1.00 mg/dL Final   04/01/2025 0.9 0.5 - 1.4 mg/dL Final     Calcium   Date Value Ref Range Status   04/21/2025 9.4 8.7 - 10.3 mg/dL Final   04/01/2025 9.2 8.7 - 10.5 mg/dL Final     Total Protein   Date Value Ref Range Status   12/12/2024 7.0 6.0 -  8.4 g/dL Final     Albumin   Date Value Ref Range Status   04/21/2025 3.9 3.8 - 4.8 g/dL Final   12/12/2024 3.7 3.5 - 5.2 g/dL Final     Total Bilirubin   Date Value Ref Range Status   04/21/2025 0.7 0.0 - 1.2 mg/dL Final     Alkaline Phosphatase   Date Value Ref Range Status   12/12/2024 88 55 - 135 U/L Final     AST   Date Value Ref Range Status   04/21/2025 18 0 - 40 IU/L Final     ALT   Date Value Ref Range Status   04/21/2025 17 0 - 32 IU/L Final     Anion Gap   Date Value Ref Range Status   12/12/2024 6 (L) 8 - 16 mmol/L Final     eGFR if    Date Value Ref Range Status   03/02/2021 >60.0 >60 mL/min/1.73 m^2 Final     eGFR if non    Date Value Ref Range Status   03/02/2021 >60.0 >60 mL/min/1.73 m^2 Final     Comment:     Calculation used to obtain the estimated glomerular filtration  rate (eGFR) is the CKD-EPI equation.          A/P:    Malignant neoplasm of the right breast   -triple negative   -cT1c N0 M0   -on neoadjuvant ACT  -completed AC portion of treatment.  Now moved on to Taxol.  -I will proceed with Taxol cycle 3 (7/8 total)  -dose reduced by 10% last visit given the fatigue.  -RTC in 2 weeks for last treatment    BLE pain/neuropathy  -on Rx Tramadol and gabapentin  -gabapentin seems to help  -seems to be getting better    Mucositis   -nearly resolved    Hypertension  -normally runs hypertensive   -stopped taking Benicar as she was getting hypotensive while also on chemotherapy    Aurash Khoobehi, MD  Hematology and Oncology    Visit today included increased complexity associated with the care of the episodic problem breast cancer addressed and managing the longitudinal care of the patient due to the serious and/or complex managed problem(s).

## 2025-04-24 ENCOUNTER — INFUSION (OUTPATIENT)
Dept: INFUSION THERAPY | Facility: HOSPITAL | Age: 75
End: 2025-04-24
Attending: INTERNAL MEDICINE
Payer: MEDICARE

## 2025-04-24 VITALS
OXYGEN SATURATION: 97 % | BODY MASS INDEX: 33.34 KG/M2 | HEIGHT: 65 IN | SYSTOLIC BLOOD PRESSURE: 111 MMHG | HEART RATE: 80 BPM | WEIGHT: 200.13 LBS | DIASTOLIC BLOOD PRESSURE: 72 MMHG | RESPIRATION RATE: 16 BRPM | TEMPERATURE: 97 F

## 2025-04-24 DIAGNOSIS — C50.211 MALIGNANT NEOPLASM OF UPPER-INNER QUADRANT OF RIGHT BREAST IN FEMALE, ESTROGEN RECEPTOR NEGATIVE: Primary | ICD-10-CM

## 2025-04-24 DIAGNOSIS — Z17.1 MALIGNANT NEOPLASM OF UPPER-INNER QUADRANT OF RIGHT BREAST IN FEMALE, ESTROGEN RECEPTOR NEGATIVE: Primary | ICD-10-CM

## 2025-04-24 PROCEDURE — 96415 CHEMO IV INFUSION ADDL HR: CPT

## 2025-04-24 PROCEDURE — A4216 STERILE WATER/SALINE, 10 ML: HCPCS | Performed by: INTERNAL MEDICINE

## 2025-04-24 PROCEDURE — 96367 TX/PROPH/DG ADDL SEQ IV INF: CPT

## 2025-04-24 PROCEDURE — 96375 TX/PRO/DX INJ NEW DRUG ADDON: CPT

## 2025-04-24 PROCEDURE — 25000003 PHARM REV CODE 250: Performed by: INTERNAL MEDICINE

## 2025-04-24 PROCEDURE — 96413 CHEMO IV INFUSION 1 HR: CPT

## 2025-04-24 PROCEDURE — 63600175 PHARM REV CODE 636 W HCPCS: Performed by: INTERNAL MEDICINE

## 2025-04-24 RX ORDER — EPINEPHRINE 0.3 MG/.3ML
0.3 INJECTION SUBCUTANEOUS ONCE AS NEEDED
Status: DISCONTINUED | OUTPATIENT
Start: 2025-04-24 | End: 2025-04-24 | Stop reason: HOSPADM

## 2025-04-24 RX ORDER — HEPARIN 100 UNIT/ML
500 SYRINGE INTRAVENOUS
Status: DISCONTINUED | OUTPATIENT
Start: 2025-04-24 | End: 2025-04-24 | Stop reason: HOSPADM

## 2025-04-24 RX ORDER — DIPHENHYDRAMINE HYDROCHLORIDE 50 MG/ML
50 INJECTION, SOLUTION INTRAMUSCULAR; INTRAVENOUS ONCE AS NEEDED
Status: DISCONTINUED | OUTPATIENT
Start: 2025-04-24 | End: 2025-04-24 | Stop reason: HOSPADM

## 2025-04-24 RX ORDER — SODIUM CHLORIDE 0.9 % (FLUSH) 0.9 %
10 SYRINGE (ML) INJECTION
Status: DISCONTINUED | OUTPATIENT
Start: 2025-04-24 | End: 2025-04-24 | Stop reason: HOSPADM

## 2025-04-24 RX ORDER — FAMOTIDINE 10 MG/ML
20 INJECTION, SOLUTION INTRAVENOUS
Status: COMPLETED | OUTPATIENT
Start: 2025-04-24 | End: 2025-04-24

## 2025-04-24 RX ADMIN — FOSAPREPITANT 150 MG: 150 INJECTION, POWDER, LYOPHILIZED, FOR SOLUTION INTRAVENOUS at 09:04

## 2025-04-24 RX ADMIN — SODIUM CHLORIDE: 9 INJECTION, SOLUTION INTRAVENOUS at 09:04

## 2025-04-24 RX ADMIN — SODIUM CHLORIDE 50 MG: 9 INJECTION, SOLUTION INTRAVENOUS at 09:04

## 2025-04-24 RX ADMIN — PACLITAXEL 324 MG: 6 INJECTION INTRAVENOUS at 10:04

## 2025-04-24 RX ADMIN — SODIUM CHLORIDE 20 MG: 9 INJECTION, SOLUTION INTRAVENOUS at 09:04

## 2025-04-24 RX ADMIN — FAMOTIDINE 20 MG: 10 INJECTION INTRAVENOUS at 09:04

## 2025-04-24 RX ADMIN — HEPARIN 500 UNITS: 100 SYRINGE at 01:04

## 2025-04-24 RX ADMIN — SODIUM CHLORIDE, PRESERVATIVE FREE 10 ML: 5 INJECTION INTRAVENOUS at 01:04

## 2025-04-24 NOTE — PLAN OF CARE
Problem: Fatigue  Goal: Improved Activity Tolerance  Intervention: Promote Improved Energy  Flowsheets (Taken 4/24/2025 9794)  Fatigue Management: fatigue-related activity identified  Activity Management: Ambulated -L4

## 2025-04-25 ENCOUNTER — INFUSION (OUTPATIENT)
Dept: INFUSION THERAPY | Facility: HOSPITAL | Age: 75
End: 2025-04-25
Attending: INTERNAL MEDICINE
Payer: MEDICARE

## 2025-04-25 VITALS
HEIGHT: 65 IN | WEIGHT: 199 LBS | OXYGEN SATURATION: 99 % | DIASTOLIC BLOOD PRESSURE: 71 MMHG | SYSTOLIC BLOOD PRESSURE: 111 MMHG | RESPIRATION RATE: 17 BRPM | TEMPERATURE: 97 F | HEART RATE: 102 BPM | BODY MASS INDEX: 33.15 KG/M2

## 2025-04-25 DIAGNOSIS — C50.211 MALIGNANT NEOPLASM OF UPPER-INNER QUADRANT OF RIGHT BREAST IN FEMALE, ESTROGEN RECEPTOR NEGATIVE: Primary | ICD-10-CM

## 2025-04-25 DIAGNOSIS — Z17.1 MALIGNANT NEOPLASM OF UPPER-INNER QUADRANT OF RIGHT BREAST IN FEMALE, ESTROGEN RECEPTOR NEGATIVE: Primary | ICD-10-CM

## 2025-04-25 PROCEDURE — 63600175 PHARM REV CODE 636 W HCPCS: Mod: JZ,TB | Performed by: INTERNAL MEDICINE

## 2025-04-25 PROCEDURE — 96372 THER/PROPH/DIAG INJ SC/IM: CPT

## 2025-04-25 RX ADMIN — PEGFILGRASTIM 6 MG: 6 INJECTION SUBCUTANEOUS at 03:04

## 2025-05-07 ENCOUNTER — TELEPHONE (OUTPATIENT)
Dept: SURGERY | Facility: CLINIC | Age: 75
End: 2025-05-07
Payer: MEDICARE

## 2025-05-07 ENCOUNTER — OFFICE VISIT (OUTPATIENT)
Dept: HEMATOLOGY/ONCOLOGY | Facility: CLINIC | Age: 75
End: 2025-05-07
Payer: MEDICARE

## 2025-05-07 VITALS
OXYGEN SATURATION: 99 % | HEART RATE: 98 BPM | DIASTOLIC BLOOD PRESSURE: 80 MMHG | WEIGHT: 194.44 LBS | HEIGHT: 64 IN | TEMPERATURE: 97 F | BODY MASS INDEX: 33.2 KG/M2 | RESPIRATION RATE: 17 BRPM | SYSTOLIC BLOOD PRESSURE: 146 MMHG

## 2025-05-07 DIAGNOSIS — Z17.1 MALIGNANT NEOPLASM OF UPPER-INNER QUADRANT OF RIGHT BREAST IN FEMALE, ESTROGEN RECEPTOR NEGATIVE: Primary | ICD-10-CM

## 2025-05-07 DIAGNOSIS — G62.9 NEUROPATHY: ICD-10-CM

## 2025-05-07 DIAGNOSIS — C50.211 MALIGNANT NEOPLASM OF UPPER-INNER QUADRANT OF RIGHT BREAST IN FEMALE, ESTROGEN RECEPTOR NEGATIVE: Primary | ICD-10-CM

## 2025-05-07 DIAGNOSIS — K12.30 MUCOSITIS: ICD-10-CM

## 2025-05-07 PROCEDURE — 1126F AMNT PAIN NOTED NONE PRSNT: CPT | Mod: CPTII,S$GLB,, | Performed by: INTERNAL MEDICINE

## 2025-05-07 PROCEDURE — 3077F SYST BP >= 140 MM HG: CPT | Mod: CPTII,S$GLB,, | Performed by: INTERNAL MEDICINE

## 2025-05-07 PROCEDURE — 1159F MED LIST DOCD IN RCRD: CPT | Mod: CPTII,S$GLB,, | Performed by: INTERNAL MEDICINE

## 2025-05-07 PROCEDURE — 3288F FALL RISK ASSESSMENT DOCD: CPT | Mod: CPTII,S$GLB,, | Performed by: INTERNAL MEDICINE

## 2025-05-07 PROCEDURE — 99999 PR PBB SHADOW E&M-EST. PATIENT-LVL IV: CPT | Mod: PBBFAC,,, | Performed by: INTERNAL MEDICINE

## 2025-05-07 PROCEDURE — 1101F PT FALLS ASSESS-DOCD LE1/YR: CPT | Mod: CPTII,S$GLB,, | Performed by: INTERNAL MEDICINE

## 2025-05-07 PROCEDURE — 99215 OFFICE O/P EST HI 40 MIN: CPT | Mod: S$GLB,,, | Performed by: INTERNAL MEDICINE

## 2025-05-07 PROCEDURE — 3079F DIAST BP 80-89 MM HG: CPT | Mod: CPTII,S$GLB,, | Performed by: INTERNAL MEDICINE

## 2025-05-07 PROCEDURE — G2211 COMPLEX E/M VISIT ADD ON: HCPCS | Mod: S$GLB,,, | Performed by: INTERNAL MEDICINE

## 2025-05-07 NOTE — TELEPHONE ENCOUNTER
----- Message from Aurash Khoobehi, MD sent at 5/7/2025  3:15 PM CDT -----  Regarding: RE: finished antonietta chemo  Also, I am ordering a follow up MRI breast as I know Dr Irwin usually wants that. Please get her seen for scheduling for surgery. Thanks  ----- Message -----  From: Paloma Rivera RN  Sent: 5/7/2025  11:07 AM CDT  To: Aurash Khoobehi, MD; [facility] Staff  Subject: finished antonietta chemo                               Good morning,Pt is finished with neoadjuvant chemotherapy. Thanks!JOSE LUIS Dow

## 2025-05-07 NOTE — Clinical Note
Ok for treatment tomorrow. MRI breast. Please message [facility] office that she is finished with neoadjuvant chemotherapy. RTC about 2 weeks after surgery

## 2025-05-07 NOTE — PROGRESS NOTES
Service Date:  5/7/25    Chief Complaint:  Breast cancer     Essence Herrera is a 75 y.o. female here for chemotherapy clearance.  Currently on Taxol portion of ACT.  Still feels tired.  I did reduce the dose of chemotherapy last visit but she is still having fatigue.  Also feels that her neuropathy is worsening.  Feels tingling up her legs.  Also has some weakness with difficulty balancing herself.    Review of Systems   Constitutional:  Positive for fatigue. Negative for appetite change and unexpected weight change.   HENT:  Negative for mouth sores.    Eyes: Negative.  Negative for visual disturbance.   Respiratory: Negative.  Negative for cough and shortness of breath.    Cardiovascular: Negative.  Negative for chest pain.   Gastrointestinal: Negative.  Negative for abdominal pain and diarrhea.   Endocrine: Negative.    Genitourinary: Negative.  Negative for frequency.   Musculoskeletal: Negative.  Positive for leg pain. Negative for back pain.   Integumentary:  Negative for rash. Negative.   Neurological: Negative.  Negative for headaches.   Hematological: Negative.  Negative for adenopathy.   Psychiatric/Behavioral: Negative.  The patient is not nervous/anxious.         Current Outpatient Medications   Medication Instructions    atorvastatin (LIPITOR) 20 mg, Daily    dexAMETHasone (DECADRON) 8 mg, Oral, See admin instructions, Take 8 mg by mouth daily on days 2, 3 and 4 of chemotherapy cycle.    diphenhydrAMINE-aluminum-magnesium hydroxide-simethicone-LIDOcaine viscous HCl 2% 15 mLs, Swish & Spit, Every 4 hours PRN    gabapentin (NEURONTIN) 100 mg, Oral, 3 times daily    LIDOcaine-prilocaine (EMLA) cream Topical (Top), As needed (PRN)    magic mouthwash diphen/antac/lidoc Swish and spit 15 mLs every 4 (four) hours as needed.    MOUNJARO 2.5 mg, Weekly    OLANZapine (ZYPREXA) 5 mg, Oral, See admin instructions, Take 1 tablet by mouth nightly on days 1-4 of each chemotherapy cycle.     "olmesartan-hydrochlorothiazide (BENICAR HCT) 20-12.5 mg per tablet 1 tablet, Daily    ondansetron (ZOFRAN-ODT) 8 mg, Oral, Every 8 hours PRN    prochlorperazine (COMPAZINE) 10 mg, Oral, Every 6 hours PRN    traMADoL (ULTRAM) 50 mg, Oral, Every 6 hours PRN    traZODone (DESYREL) 50 mg, Oral, Nightly        Past Medical History:   Diagnosis Date    Hyperlipidemia     Hypertension         Past Surgical History:   Procedure Laterality Date    BUNIONECTOMY Bilateral 1996    CARPAL TUNNEL RELEASE Bilateral 1998    DILATION AND CURETTAGE OF UTERUS      HYSTERECTOMY      TVH with conservation of the ovaries    INSERTION OF TUNNELED CENTRAL VENOUS CATHETER (CVC) WITH SUBCUTANEOUS PORT Right 12/16/2024    Procedure: [id number];  Surgeon: Juaquin Sanchez Jr., MD;  Location: [facility] OR;  Service: General;  Laterality: Right;    ROTATOR CUFF REPAIR Right 2005    TUBAL LIGATION          Family History   Problem Relation Name Age of Onset    Breast cancer Maternal Grandfather      Breast cancer Maternal Aunt         Social History     Tobacco Use    Smoking status: Never    Smokeless tobacco: Never   Substance Use Topics    Alcohol use: Not Currently    Drug use: Never         Vitals:    05/07/25 0953   BP: (!) 146/80   Pulse: 98   Resp: 17   Temp: 97.3 °F (36.3 °C)        Physical Exam:  BP (!) 146/80 (BP Location: Right arm, Patient Position: Sitting)   Pulse 98   Temp 97.3 °F (36.3 °C) (Temporal)   Resp 17   Ht 5' 4" (1.626 m)   Wt 88.2 kg (194 lb 7.1 oz)   SpO2 99%   BMI 33.38 kg/m²     Physical Exam  Constitutional:       Appearance: Normal appearance.   HENT:      Head: Normocephalic and atraumatic.      Nose: Nose normal.      Mouth/Throat:      Mouth: Mucous membranes are moist.      Pharynx: Oropharynx is clear.   Eyes:      Conjunctiva/sclera: Conjunctivae normal.   Cardiovascular:      Rate and Rhythm: Normal rate and regular rhythm.      Heart sounds: Normal heart sounds.   Pulmonary:      Effort: " Pulmonary effort is normal.      Breath sounds: Normal breath sounds.   Abdominal:      General: Abdomen is flat. Bowel sounds are normal.      Palpations: Abdomen is soft.   Musculoskeletal:         General: Normal range of motion.      Cervical back: Normal range of motion and neck supple.   Skin:     General: Skin is warm and dry.   Neurological:      General: No focal deficit present.      Mental Status: She is alert and oriented to person, place, and time. Mental status is at baseline.   Psychiatric:         Mood and Affect: Mood normal.          Labs:  Lab Results   Component Value Date    WBC 17.8 (H) 05/05/2025    RBC 3.40 (L) 05/05/2025    HGB 10.9 (L) 05/05/2025    HCT 33.1 (L) 05/05/2025    MCV 97 05/05/2025    MCH 32.1 05/05/2025    MCHC 32.9 05/05/2025    RDW 16.0 (H) 05/05/2025     05/05/2025    MPV 11.4 04/01/2025    GRAN 2.3 12/16/2024    GRAN 44.3 12/16/2024    LYMPH 0.7 05/05/2025    MONO 4 05/05/2025    MONO 0.7 05/05/2025    EOS 0.2 05/05/2025    BASO 0.2 05/05/2025    EOSINOPHIL 1 05/05/2025    BASOPHIL 1 05/05/2025     Sodium   Date Value Ref Range Status   05/05/2025 132 (L) 134 - 144 mmol/L Final   04/01/2025 135 (L) 136 - 145 mmol/L Final     Potassium   Date Value Ref Range Status   05/05/2025 4.7 3.5 - 5.2 mmol/L Final     Comment:     Specimen received hemolyzed. Value may be increased by hemolysis.  Clinical correlation indicated.     04/01/2025 3.6 3.5 - 5.1 mmol/L Final     Chloride   Date Value Ref Range Status   05/05/2025 97 96 - 106 mmol/L Final   04/01/2025 102 95 - 110 mmol/L Final     CO2   Date Value Ref Range Status   05/05/2025 19 (L) 20 - 29 mmol/L Final   04/01/2025 23 23 - 29 mmol/L Final     Glucose   Date Value Ref Range Status   05/05/2025 111 (H) 70 - 99 mg/dL Final   04/01/2025 110 70 - 110 mg/dL Final     BUN   Date Value Ref Range Status   05/05/2025 11 8 - 27 mg/dL Final   04/01/2025 17 8 - 23 mg/dL Final     Creatinine   Date Value Ref Range Status    05/05/2025 0.96 0.57 - 1.00 mg/dL Final   04/01/2025 0.9 0.5 - 1.4 mg/dL Final     Calcium   Date Value Ref Range Status   05/05/2025 9.3 8.7 - 10.3 mg/dL Final   04/01/2025 9.2 8.7 - 10.5 mg/dL Final     Total Protein   Date Value Ref Range Status   12/12/2024 7.0 6.0 - 8.4 g/dL Final     Albumin   Date Value Ref Range Status   05/05/2025 3.6 (L) 3.8 - 4.8 g/dL Final   12/12/2024 3.7 3.5 - 5.2 g/dL Final     Total Bilirubin   Date Value Ref Range Status   05/05/2025 0.5 0.0 - 1.2 mg/dL Final     Alkaline Phosphatase   Date Value Ref Range Status   12/12/2024 88 55 - 135 U/L Final     AST   Date Value Ref Range Status   05/05/2025 26 0 - 40 IU/L Final     ALT   Date Value Ref Range Status   05/05/2025 16 0 - 32 IU/L Final     Anion Gap   Date Value Ref Range Status   04/01/2025 10 8 - 16 mmol/L Final     eGFR if    Date Value Ref Range Status   03/02/2021 >60.0 >60 mL/min/1.73 m^2 Final     eGFR if non    Date Value Ref Range Status   03/02/2021 >60.0 >60 mL/min/1.73 m^2 Final     Comment:     Calculation used to obtain the estimated glomerular filtration  rate (eGFR) is the CKD-EPI equation.          A/P:    Malignant neoplasm of the right breast   -triple negative   -cT1c N0 M0   -on neoadjuvant ACT  -completed AC portion of treatment.  Now moved on to Taxol.  -proceed with last cycle of Taxol.  Reduce it by another 10% for total of 20% given the worsening neuropathy  -ordered follow up MRI today.  Message General surgery office to let them know that this is their last treatment.    BLE pain/neuropathy  -on Rx Tramadol and gabapentin  -gabapentin seems to help  -seems to be getting worse now, reduce Taxol as above    Mucositis   -nearly resolved    Hypertension  -normally runs hypertensive   -stopped taking Benicar as she was getting hypotensive while also on chemotherapy    Aurash Khoobehi, MD  Hematology and Oncology    Visit today included increased complexity associated with  the care of the episodic problem breast cancer addressed and managing the longitudinal care of the patient due to the serious and/or complex managed problem(s).

## 2025-05-08 ENCOUNTER — INFUSION (OUTPATIENT)
Dept: INFUSION THERAPY | Facility: HOSPITAL | Age: 75
End: 2025-05-08
Attending: INTERNAL MEDICINE
Payer: MEDICARE

## 2025-05-08 ENCOUNTER — DOCUMENTATION ONLY (OUTPATIENT)
Dept: NUTRITION | Facility: HOSPITAL | Age: 75
End: 2025-05-08

## 2025-05-08 VITALS
TEMPERATURE: 97 F | WEIGHT: 192.19 LBS | HEIGHT: 64 IN | HEART RATE: 90 BPM | BODY MASS INDEX: 32.81 KG/M2 | RESPIRATION RATE: 17 BRPM | OXYGEN SATURATION: 99 % | DIASTOLIC BLOOD PRESSURE: 73 MMHG | SYSTOLIC BLOOD PRESSURE: 119 MMHG

## 2025-05-08 DIAGNOSIS — C50.211 MALIGNANT NEOPLASM OF UPPER-INNER QUADRANT OF RIGHT BREAST IN FEMALE, ESTROGEN RECEPTOR NEGATIVE: Primary | ICD-10-CM

## 2025-05-08 DIAGNOSIS — Z17.1 MALIGNANT NEOPLASM OF UPPER-INNER QUADRANT OF RIGHT BREAST IN FEMALE, ESTROGEN RECEPTOR NEGATIVE: Primary | ICD-10-CM

## 2025-05-08 PROCEDURE — 25000003 PHARM REV CODE 250: Performed by: NURSE PRACTITIONER

## 2025-05-08 PROCEDURE — 96375 TX/PRO/DX INJ NEW DRUG ADDON: CPT

## 2025-05-08 PROCEDURE — 96367 TX/PROPH/DG ADDL SEQ IV INF: CPT

## 2025-05-08 PROCEDURE — 63600175 PHARM REV CODE 636 W HCPCS: Performed by: NURSE PRACTITIONER

## 2025-05-08 PROCEDURE — A4216 STERILE WATER/SALINE, 10 ML: HCPCS | Performed by: NURSE PRACTITIONER

## 2025-05-08 PROCEDURE — 96415 CHEMO IV INFUSION ADDL HR: CPT

## 2025-05-08 PROCEDURE — 96413 CHEMO IV INFUSION 1 HR: CPT

## 2025-05-08 RX ORDER — HEPARIN 100 UNIT/ML
500 SYRINGE INTRAVENOUS
OUTPATIENT
Start: 2025-05-08

## 2025-05-08 RX ORDER — EPINEPHRINE 0.3 MG/.3ML
0.3 INJECTION SUBCUTANEOUS ONCE AS NEEDED
Status: CANCELLED | OUTPATIENT
Start: 2025-05-08

## 2025-05-08 RX ORDER — FAMOTIDINE 10 MG/ML
20 INJECTION, SOLUTION INTRAVENOUS
Status: COMPLETED | OUTPATIENT
Start: 2025-05-08 | End: 2025-05-08

## 2025-05-08 RX ORDER — FAMOTIDINE 10 MG/ML
20 INJECTION, SOLUTION INTRAVENOUS
Status: CANCELLED | OUTPATIENT
Start: 2025-05-08

## 2025-05-08 RX ORDER — DIPHENHYDRAMINE HYDROCHLORIDE 50 MG/ML
50 INJECTION, SOLUTION INTRAMUSCULAR; INTRAVENOUS ONCE AS NEEDED
Status: DISCONTINUED | OUTPATIENT
Start: 2025-05-08 | End: 2025-05-08 | Stop reason: HOSPADM

## 2025-05-08 RX ORDER — HEPARIN 100 UNIT/ML
500 SYRINGE INTRAVENOUS
Status: DISCONTINUED | OUTPATIENT
Start: 2025-05-08 | End: 2025-05-08 | Stop reason: HOSPADM

## 2025-05-08 RX ORDER — EPINEPHRINE 0.3 MG/.3ML
0.3 INJECTION SUBCUTANEOUS ONCE AS NEEDED
Status: DISCONTINUED | OUTPATIENT
Start: 2025-05-08 | End: 2025-05-08 | Stop reason: HOSPADM

## 2025-05-08 RX ORDER — SODIUM CHLORIDE 0.9 % (FLUSH) 0.9 %
10 SYRINGE (ML) INJECTION
Status: CANCELLED | OUTPATIENT
Start: 2025-05-08

## 2025-05-08 RX ORDER — DIPHENHYDRAMINE HYDROCHLORIDE 50 MG/ML
50 INJECTION, SOLUTION INTRAMUSCULAR; INTRAVENOUS ONCE AS NEEDED
Status: CANCELLED | OUTPATIENT
Start: 2025-05-08

## 2025-05-08 RX ORDER — SODIUM CHLORIDE 0.9 % (FLUSH) 0.9 %
10 SYRINGE (ML) INJECTION
OUTPATIENT
Start: 2025-05-08

## 2025-05-08 RX ORDER — SODIUM CHLORIDE 0.9 % (FLUSH) 0.9 %
10 SYRINGE (ML) INJECTION
Status: DISCONTINUED | OUTPATIENT
Start: 2025-05-08 | End: 2025-05-08 | Stop reason: HOSPADM

## 2025-05-08 RX ORDER — HEPARIN 100 UNIT/ML
500 SYRINGE INTRAVENOUS
Status: CANCELLED | OUTPATIENT
Start: 2025-05-08

## 2025-05-08 RX ADMIN — SODIUM CHLORIDE: 9 INJECTION, SOLUTION INTRAVENOUS at 09:05

## 2025-05-08 RX ADMIN — FOSAPREPITANT 150 MG: 150 INJECTION, POWDER, LYOPHILIZED, FOR SOLUTION INTRAVENOUS at 10:05

## 2025-05-08 RX ADMIN — FAMOTIDINE 20 MG: 10 INJECTION INTRAVENOUS at 09:05

## 2025-05-08 RX ADMIN — PACLITAXEL 276 MG: 6 INJECTION INTRAVENOUS at 10:05

## 2025-05-08 RX ADMIN — SODIUM CHLORIDE 50 MG: 9 INJECTION, SOLUTION INTRAVENOUS at 09:05

## 2025-05-08 RX ADMIN — HEPARIN 500 UNITS: 100 SYRINGE at 01:05

## 2025-05-08 RX ADMIN — Medication 10 ML: at 01:05

## 2025-05-08 RX ADMIN — SODIUM CHLORIDE 20 MG: 9 INJECTION, SOLUTION INTRAVENOUS at 09:05

## 2025-05-08 NOTE — PLAN OF CARE
Problem: Fatigue  Goal: Improved Activity Tolerance  Outcome: Progressing  Intervention: Promote Improved Energy  Flowsheets (Taken 5/8/2025 0901)  Fatigue Management:   fatigue-related activity identified   frequent rest breaks encouraged   paced activity encouraged  Sleep/Rest Enhancement:   regular sleep/rest pattern promoted   relaxation techniques promoted  Activity Management: Ambulated -L4  Environmental Support: rest periods encouraged

## 2025-05-08 NOTE — PROGRESS NOTES
NUTRITION     Essence Herrera, 75 y.o. female is here for chemotherapy infusion of: Taxol. Diagnosis: Breast cancer.    I met with d/t weight loss of 20# in month (9%) which is severe. Patient reports decreased po intake d/t dysgeusia, unable to taste foods which causes gagging and low po intake. She drinks one bottle of ensure daily but it does give her diarrhea if she consumes >1 bottle/day. We discussed ways of increasing calorie/protein, recipes provided and ways to cope with dysgeusia/diarrhea.     Wt Readings    05/08/25 87.2 kg (192 lb 3.2 oz)   05/07/25 88.2 kg (194 lb 7.1 oz)   04/25/25 90.3 kg (199 lb)   04/09/25 94.1 kg (207 lb 7.3 oz)   04/03/25 96.8 kg (213 lb 6.5 oz)   04/01/25 96.2 kg (212 lb)   03/12/25 99.1 kg (218 lb 7.6 oz)   01/31/25 104.4 kg (230 lb 1.6 oz)     Past Medical History:   Diagnosis Date    Hyperlipidemia     Hypertension          Plan:   Increase intake of Ensure daily to 2-3 bottles per day  Add citrus foods regularly to help with dysgeusia and hopefully increase calories and protein intake  Handouts and contact info provided. Encouraged pt to call with any questions/concerns.   Will f/u as needed.         Electronically signed by: Nishi Christy MS, RDN/LDN, CDCES

## 2025-05-21 ENCOUNTER — OFFICE VISIT (OUTPATIENT)
Dept: SURGERY | Facility: CLINIC | Age: 75
End: 2025-05-21
Payer: MEDICARE

## 2025-05-21 VITALS
HEIGHT: 64 IN | SYSTOLIC BLOOD PRESSURE: 89 MMHG | DIASTOLIC BLOOD PRESSURE: 62 MMHG | HEART RATE: 105 BPM | TEMPERATURE: 98 F | WEIGHT: 192 LBS | BODY MASS INDEX: 32.78 KG/M2

## 2025-05-21 DIAGNOSIS — C50.211 MALIGNANT NEOPLASM OF UPPER-INNER QUADRANT OF RIGHT BREAST IN FEMALE, ESTROGEN RECEPTOR NEGATIVE: Primary | ICD-10-CM

## 2025-05-21 DIAGNOSIS — Z17.1 MALIGNANT NEOPLASM OF UPPER-INNER QUADRANT OF RIGHT BREAST IN FEMALE, ESTROGEN RECEPTOR NEGATIVE: Primary | ICD-10-CM

## 2025-05-21 PROCEDURE — 3074F SYST BP LT 130 MM HG: CPT | Mod: CPTII,S$GLB,, | Performed by: SURGERY

## 2025-05-21 PROCEDURE — 3078F DIAST BP <80 MM HG: CPT | Mod: CPTII,S$GLB,, | Performed by: SURGERY

## 2025-05-21 PROCEDURE — 1126F AMNT PAIN NOTED NONE PRSNT: CPT | Mod: CPTII,S$GLB,, | Performed by: SURGERY

## 2025-05-21 PROCEDURE — 99999 PR PBB SHADOW E&M-EST. PATIENT-LVL III: CPT | Mod: PBBFAC,,, | Performed by: SURGERY

## 2025-05-21 PROCEDURE — 99214 OFFICE O/P EST MOD 30 MIN: CPT | Mod: S$GLB,,, | Performed by: SURGERY

## 2025-05-21 RX ORDER — CEFAZOLIN SODIUM 2 G/50ML
2 SOLUTION INTRAVENOUS
OUTPATIENT
Start: 2025-05-21

## 2025-05-21 NOTE — H&P
GENERAL SURGERY  OUTPATIENT H&P    REASON FOR VISIT/CC: Right breast cancer    HPI: Essence Herrera is a 75 y.o. 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  A port was placed on 12/16/2024 and she began neoadjuvant therapy.  Just completed last round of Taxol.  Having significant neuropathy and fatigue. Now planning to proceed with surgical portion of her treatment. The patient is interested in the least invasive surgery with the least amount of downtime.  She does have a trip planned for [place] in the next 2 weeks.    I have reviewed the patient's chart including prior progress notes, procedures and testing.     ROS:   Review of Systems    PROBLEM LIST:  Problem List[1]      HISTORY  Past Medical History:   Diagnosis Date    Hyperlipidemia     Hypertension        Past Surgical History:   Procedure Laterality Date    BUNIONECTOMY Bilateral 1996    CARPAL TUNNEL RELEASE Bilateral 1998    DILATION AND CURETTAGE OF UTERUS      HYSTERECTOMY      TVH with conservation of the ovaries    INSERTION OF TUNNELED CENTRAL VENOUS CATHETER (CVC) WITH SUBCUTANEOUS PORT Right 12/16/2024    Procedure: [id number];  Surgeon: Juaquin Sanchez Jr., MD;  Location: [facility];  Service: General;  Laterality: Right;    ROTATOR CUFF REPAIR Right 2005    TUBAL LIGATION         Social History[2]    Family History   Problem Relation Name Age of Onset    Breast cancer Maternal Grandfather      Breast cancer Maternal Aunt           MEDS:  Medications Ordered Prior to Encounter[3]    ALLERGIES:  Review of patient's allergies indicates:  No Known Allergies      VITALS:  Vitals:    05/21/25 0909   BP: (!) 89/62   Pulse: 105   Temp: 97.7 °F (36.5 °C)         PHYSICAL EXAM:  Physical Exam  Vitals reviewed.   Constitutional:       General: She is not in acute distress.     Appearance: Normal appearance.  She is well-developed.   HENT:      Head: Normocephalic and atraumatic.      Nose: Nose normal.   Eyes:      General: No scleral icterus.     Conjunctiva/sclera: Conjunctivae normal.   Neck:      Trachea: No tracheal tenderness or tracheal deviation.   Cardiovascular:      Rate and Rhythm: Normal rate and regular rhythm.      Pulses: Normal pulses.   Pulmonary:      Effort: Pulmonary effort is normal. No accessory muscle usage or respiratory distress.      Breath sounds: Normal breath sounds.   Abdominal:      General: There is no distension.      Palpations: Abdomen is soft.      Tenderness: There is no abdominal tenderness.   Musculoskeletal:         General: No swelling or tenderness. Normal range of motion.      Cervical back: Normal range of motion and neck supple. No rigidity.   Skin:     General: Skin is warm and dry.      Coloration: Skin is not jaundiced.      Findings: No erythema.   Neurological:      General: No focal deficit present.      Mental Status: She is alert and oriented to person, place, and time.      Motor: Weakness present. No abnormal muscle tone.   Psychiatric:         Mood and Affect: Mood normal.         Behavior: Behavior normal.         Thought Content: Thought content normal.         Judgment: Judgment normal.           LABS:  Lab Results   Component Value Date    WBC 17.8 (H) 05/05/2025    WBC 26.77 (H) 04/01/2025    RBC 3.40 (L) 05/05/2025    RBC 3.27 (L) 04/01/2025    HGB 10.9 (L) 05/05/2025    HGB 10.1 (L) 04/01/2025    HCT 33.1 (L) 05/05/2025    HCT 30.2 (L) 04/01/2025     05/05/2025     04/01/2025     Lab Results   Component Value Date     (H) 05/05/2025     04/01/2025     (L) 05/05/2025     (L) 04/01/2025    K 4.7 05/05/2025    K 3.6 04/01/2025    CL 97 05/05/2025     04/01/2025    CO2 19 (L) 05/05/2025    CO2 23 04/01/2025    BUN 11 05/05/2025    BUN 17 04/01/2025    CREATININE 0.96 05/05/2025    CREATININE 0.9 04/01/2025     CALCIUM 9.3 05/05/2025    CALCIUM 9.2 04/01/2025     Lab Results   Component Value Date    ALT 16 05/05/2025    AST 26 05/05/2025    ALKPHOS 88 12/12/2024    BILITOT 0.5 05/05/2025     Lab Results   Component Value Date    MG 2.1 05/05/2025    PHOS 3.8 03/02/2021       STUDIES:  Images and reports were personally reviewed.  CTA from 04/01/2025 reviewed, portions of the right breast were visualized, biopsy marker was noted      ASSESSMENT & PLAN:  75 y.o. 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 this has now completed ACT neoadjuvant therapy  -the patient has completed neoadjuvant therapy and now planning to proceed with surgical intervention   -discussed options including mastectomy sentinel lymph node biopsy versus lumpectomy with sentinel lymph node biopsy followed by radiation, the patient is interested in the breast conservation therapy   -risks of the procedure for both the lumpectomy/partial mastectomy and axillary sentinel lymph node biopsy were reviewed, these include pain, bleeding, scarring, infection, seroma, hematoma, injury to nerves or vessels, need for further intervention, etc., patient expressed understanding these risks   -will tentatively plan for surgery on 06/12/2025 at [facility]   -will follow up on repeat MRI of the breast to assure this has no contraindications to proceeding with BCT, this has scheduled for this Friday                 [1]   Patient Active Problem List  Diagnosis    Pneumonia due to COVID-19 virus    Essential hypertension    Hyperlipidemia    Acute respiratory failure with hypoxia    Severe obesity (BMI 35.0-39.9) with comorbidity    Malignant neoplasm of upper-inner quadrant of right breast in female, estrogen receptor negative   [2]   Social History  Tobacco Use    Smoking status: Never    Smokeless tobacco: Never   Substance Use Topics    Alcohol use: Not Currently    Drug use: Never   [3]   Current Outpatient Medications on File Prior to Visit    Medication Sig Dispense Refill    dexAMETHasone (DECADRON) 4 MG Tab Take 2 tablets (8 mg total) by mouth As instructed. Take 8 mg by mouth daily on days 2, 3 and 4 of chemotherapy cycle. 24 tablet 3    diphenhydrAMINE-aluminum-magnesium hydroxide-simethicone-LIDOcaine viscous HCl 2% Swish and spit 15 mLs every 4 (four) hours as needed. 1 each 2    gabapentin (NEURONTIN) 100 MG capsule Take 1 capsule (100 mg total) by mouth 3 (three) times daily. 90 capsule 2    LIDOcaine-prilocaine (EMLA) cream Apply topically as needed (Apply to port site 30 minutes before access as needed). 30 g 0    magic mouthwash diphen/antac/lidoc Swish and spit 15 mLs every 4 (four) hours as needed. 450 mL 2    MOUNJARO 2.5 mg/0.5 mL PnIj Inject 2.5 mg into the skin once a week.      OLANZapine (ZYPREXA) 5 MG tablet Take 1 tablet (5 mg total) by mouth As instructed. Take 1 tablet by mouth nightly on days 1-4 of each chemotherapy cycle. 30 tablet 0    olmesartan-hydrochlorothiazide (BENICAR HCT) 20-12.5 mg per tablet Take 1 tablet by mouth once daily.      ondansetron (ZOFRAN-ODT) 8 MG TbDL Take 1 tablet (8 mg total) by mouth every 8 (eight) hours as needed (Nausea). 60 tablet 5    prochlorperazine (COMPAZINE) 10 MG tablet Take 1 tablet (10 mg total) by mouth every 6 (six) hours as needed. 30 tablet 1    traMADoL (ULTRAM) 50 mg tablet Take 1 tablet (50 mg total) by mouth every 6 (six) hours as needed for Pain. 30 tablet 0    traZODone (DESYREL) 50 MG tablet Take 1 tablet (50 mg total) by mouth every evening. 30 tablet 11    atorvastatin (LIPITOR) 20 MG tablet Take 20 mg by mouth once daily. (Patient not taking: Reported on 5/21/2025)       No current facility-administered medications on file prior to visit.

## 2025-05-22 ENCOUNTER — HOSPITAL ENCOUNTER (INPATIENT)
Facility: HOSPITAL | Age: 75
LOS: 3 days | Discharge: HOME HEALTH | DRG: 164 | End: 2025-05-26
Attending: EMERGENCY MEDICINE | Admitting: INTERNAL MEDICINE
Payer: MEDICARE

## 2025-05-22 DIAGNOSIS — N39.0 URINARY TRACT INFECTION WITHOUT HEMATURIA, SITE UNSPECIFIED: ICD-10-CM

## 2025-05-22 DIAGNOSIS — I26.92 ACUTE SADDLE PULMONARY EMBOLISM: ICD-10-CM

## 2025-05-22 DIAGNOSIS — R06.02 SHORTNESS OF BREATH: ICD-10-CM

## 2025-05-22 DIAGNOSIS — I82.4Y2 ACUTE DEEP VEIN THROMBOSIS (DVT) OF PROXIMAL VEIN OF LEFT LOWER EXTREMITY: ICD-10-CM

## 2025-05-22 DIAGNOSIS — M79.89 LEG SWELLING: ICD-10-CM

## 2025-05-22 DIAGNOSIS — I26.92 ACUTE SADDLE PULMONARY EMBOLISM WITHOUT ACUTE COR PULMONALE: Primary | ICD-10-CM

## 2025-05-22 DIAGNOSIS — I26.92 SADDLE EMBOLUS OF PULMONARY ARTERY WITHOUT ACUTE COR PULMONALE: ICD-10-CM

## 2025-05-22 DIAGNOSIS — R07.9 CHEST PAIN: ICD-10-CM

## 2025-05-22 DIAGNOSIS — R06.02 SOB (SHORTNESS OF BREATH): ICD-10-CM

## 2025-05-22 LAB
ABSOLUTE EOSINOPHIL (SMH): 0.02 K/UL
ABSOLUTE MONOCYTE (SMH): 0.4 K/UL (ref 0.3–1)
ABSOLUTE NEUTROPHIL COUNT (SMH): 2.7 K/UL (ref 1.8–7.7)
ALBUMIN SERPL-MCNC: 3.4 G/DL (ref 3.5–5.2)
ALP SERPL-CCNC: 78 UNIT/L (ref 55–135)
ALT SERPL-CCNC: 17 UNIT/L (ref 10–44)
ANION GAP (SMH): 11 MMOL/L (ref 8–16)
AST SERPL-CCNC: 21 UNIT/L (ref 10–40)
BACTERIA #/AREA URNS AUTO: ABNORMAL /HPF
BASOPHILS # BLD AUTO: 0.01 K/UL
BASOPHILS NFR BLD AUTO: 0.3 %
BILIRUB SERPL-MCNC: 0.7 MG/DL (ref 0.1–1)
BILIRUB UR QL STRIP.AUTO: NEGATIVE
BNP SERPL-MCNC: 100 PG/ML
BUN SERPL-MCNC: 25 MG/DL (ref 8–23)
CALCIUM SERPL-MCNC: 9.2 MG/DL (ref 8.7–10.5)
CHLORIDE SERPL-SCNC: 97 MMOL/L (ref 95–110)
CLARITY UR: ABNORMAL
CO2 SERPL-SCNC: 24 MMOL/L (ref 23–29)
COLOR UR AUTO: YELLOW
CREAT SERPL-MCNC: 0.8 MG/DL (ref 0.5–1.4)
ERYTHROCYTE [DISTWIDTH] IN BLOOD BY AUTOMATED COUNT: 14.7 % (ref 11.5–14.5)
GFR SERPLBLD CREATININE-BSD FMLA CKD-EPI: >60 ML/MIN/1.73/M2
GLUCOSE SERPL-MCNC: 104 MG/DL (ref 70–110)
GLUCOSE UR QL STRIP: NEGATIVE
HCT VFR BLD AUTO: 32.7 % (ref 37–48.5)
HGB BLD-MCNC: 10.8 GM/DL (ref 12–16)
HGB UR QL STRIP: ABNORMAL
IMM GRANULOCYTES # BLD AUTO: 0.02 K/UL (ref 0–0.04)
IMM GRANULOCYTES NFR BLD AUTO: 0.6 % (ref 0–0.5)
INFLUENZA A MOLECULAR (OHS): NEGATIVE
INFLUENZA B MOLECULAR (OHS): NEGATIVE
KETONES UR QL STRIP: ABNORMAL
LDH SERPL L TO P-CCNC: 1.8 MMOL/L (ref 0.5–2.2)
LEUKOCYTE ESTERASE UR QL STRIP: ABNORMAL
LYMPHOCYTES # BLD AUTO: 0.41 K/UL (ref 1–4.8)
MCH RBC QN AUTO: 31.1 PG (ref 27–31)
MCHC RBC AUTO-ENTMCNC: 33 G/DL (ref 32–36)
MCV RBC AUTO: 94 FL (ref 82–98)
MICROSCOPIC COMMENT: ABNORMAL
NITRITE UR QL STRIP: POSITIVE
NUCLEATED RBC (/100WBC) (SMH): 0 /100 WBC
PH UR STRIP: 5 [PH]
PLATELET # BLD AUTO: 219 K/UL (ref 150–450)
PMV BLD AUTO: 10.2 FL (ref 9.2–12.9)
POTASSIUM SERPL-SCNC: 3.4 MMOL/L (ref 3.5–5.1)
PROT SERPL-MCNC: 6.5 GM/DL (ref 6–8.4)
PROT UR QL STRIP: ABNORMAL
RBC # BLD AUTO: 3.47 M/UL (ref 4–5.4)
RBC #/AREA URNS AUTO: 7 /HPF
RELATIVE EOSINOPHIL (SMH): 0.6 % (ref 0–8)
RELATIVE LYMPHOCYTE (SMH): 11.6 % (ref 18–48)
RELATIVE MONOCYTE (SMH): 11.3 % (ref 4–15)
RELATIVE NEUTROPHIL (SMH): 75.6 % (ref 38–73)
SAMPLE: NORMAL
SARS-COV-2 RDRP RESP QL NAA+PROBE: NEGATIVE
SODIUM SERPL-SCNC: 132 MMOL/L (ref 136–145)
SP GR UR STRIP: >=1.03
SQUAMOUS #/AREA URNS AUTO: 5 /HPF
TROPONIN HIGH SENSITIVE (SMH): 55.9 PG/ML
UROBILINOGEN UR STRIP-ACNC: NEGATIVE EU/DL
WBC # BLD AUTO: 3.54 K/UL (ref 3.9–12.7)
WBC #/AREA URNS AUTO: >100 /HPF

## 2025-05-22 PROCEDURE — 93005 ELECTROCARDIOGRAM TRACING: CPT | Performed by: INTERNAL MEDICINE

## 2025-05-22 PROCEDURE — 85025 COMPLETE CBC W/AUTO DIFF WBC: CPT | Performed by: NURSE PRACTITIONER

## 2025-05-22 PROCEDURE — 84484 ASSAY OF TROPONIN QUANT: CPT | Performed by: EMERGENCY MEDICINE

## 2025-05-22 PROCEDURE — U0002 COVID-19 LAB TEST NON-CDC: HCPCS | Performed by: NURSE PRACTITIONER

## 2025-05-22 PROCEDURE — 63600175 PHARM REV CODE 636 W HCPCS: Performed by: STUDENT IN AN ORGANIZED HEALTH CARE EDUCATION/TRAINING PROGRAM

## 2025-05-22 PROCEDURE — 81001 URINALYSIS AUTO W/SCOPE: CPT | Performed by: NURSE PRACTITIONER

## 2025-05-22 PROCEDURE — 87502 INFLUENZA DNA AMP PROBE: CPT | Performed by: NURSE PRACTITIONER

## 2025-05-22 PROCEDURE — 84450 TRANSFERASE (AST) (SGOT): CPT | Performed by: NURSE PRACTITIONER

## 2025-05-22 PROCEDURE — 96372 THER/PROPH/DIAG INJ SC/IM: CPT | Performed by: STUDENT IN AN ORGANIZED HEALTH CARE EDUCATION/TRAINING PROGRAM

## 2025-05-22 PROCEDURE — 25500020 PHARM REV CODE 255: Performed by: EMERGENCY MEDICINE

## 2025-05-22 PROCEDURE — 84484 ASSAY OF TROPONIN QUANT: CPT | Performed by: NURSE PRACTITIONER

## 2025-05-22 PROCEDURE — 36415 COLL VENOUS BLD VENIPUNCTURE: CPT | Performed by: NURSE PRACTITIONER

## 2025-05-22 PROCEDURE — 87040 BLOOD CULTURE FOR BACTERIA: CPT | Performed by: NURSE PRACTITIONER

## 2025-05-22 PROCEDURE — 83880 ASSAY OF NATRIURETIC PEPTIDE: CPT | Performed by: NURSE PRACTITIONER

## 2025-05-22 PROCEDURE — 87086 URINE CULTURE/COLONY COUNT: CPT | Performed by: NURSE PRACTITIONER

## 2025-05-22 PROCEDURE — 93010 ELECTROCARDIOGRAM REPORT: CPT | Mod: ,,, | Performed by: INTERNAL MEDICINE

## 2025-05-22 PROCEDURE — 99285 EMERGENCY DEPT VISIT HI MDM: CPT | Mod: 25

## 2025-05-22 RX ORDER — TIRZEPATIDE 5 MG/.5ML
5 INJECTION, SOLUTION SUBCUTANEOUS
COMMUNITY
Start: 2025-05-21

## 2025-05-22 RX ORDER — ENOXAPARIN SODIUM 100 MG/ML
1 INJECTION SUBCUTANEOUS
Status: COMPLETED | OUTPATIENT
Start: 2025-05-22 | End: 2025-05-22

## 2025-05-22 RX ORDER — ATORVASTATIN CALCIUM 10 MG/1
10 TABLET, FILM COATED ORAL DAILY
COMMUNITY
Start: 2025-04-26

## 2025-05-22 RX ADMIN — ENOXAPARIN SODIUM 90 MG: 100 INJECTION SUBCUTANEOUS at 07:05

## 2025-05-22 RX ADMIN — IOHEXOL 100 ML: 350 INJECTION, SOLUTION INTRAVENOUS at 06:05

## 2025-05-22 NOTE — Clinical Note
ASA has not been completed in [facility], MD reminded. MD Scrubbed in. MD states he will sign it on paper immediately following case.

## 2025-05-22 NOTE — Clinical Note
24 fr inari catheter repositioned to right upper pulmonary artery, aspirated, then repositioned to left upper pulmonary artery for aspiration

## 2025-05-22 NOTE — Clinical Note
Amplatz out, Grant wire and Jr4 used to reposition amplatz wire into left lower pulmonary artery. Jr4 removed, 20 fr inari catheter inserted into left lower pulmonary artery

## 2025-05-22 NOTE — Clinical Note
The physician was paged. MD informed consents need to be signed and ASA needs to be completed in EPIC

## 2025-05-22 NOTE — ED PROVIDER NOTES
Encounter Date: 5/22/2025       History     Chief Complaint   Patient presents with    Shortness of Breath     Dyspnea on exertion for a few weeks. Patient states that it is worse over the last couple of days.  Patient also was BLE edema, which is a symptom that she has had for some time.  Dr Chung sent patient here for increasing SOB any tachycardia.  Patient had chemotherapy 3 weeks ago.  She is preparing for lumpectomy on 6/12/25    Leg Swelling    Tachycardia     HPI    Essence Herrera is a 75 y.o. F with PMHx of malignant breast cancer presents for evaluation of progressive shortness breath over the last 3-4 weeks.  She reports that shortness breath is worse on exertion.  Patient also had to have swelling to her left lower extremity time 3 days ago, denies any pain.  Patient reports that she had history of recurrent episodes of swelling that is typically bilateral but was taken off her blood pressure medication was also diaphoretic.  She denies any fevers, cough, congestion, chest pain, or vomiting.    Patient is undergoing chemotherapy treatment, reports that she completed her last dose for malignant breast cancer.    Review of patient's allergies indicates:  No Known Allergies  Past Medical History:   Diagnosis Date    Hyperlipidemia     Hypertension      Past Surgical History:   Procedure Laterality Date    BUNIONECTOMY Bilateral 1996    CARPAL TUNNEL RELEASE Bilateral 1998    DILATION AND CURETTAGE OF UTERUS      HYSTERECTOMY      TVH with conservation of the ovaries    INSERTION OF TUNNELED CENTRAL VENOUS CATHETER (CVC) WITH SUBCUTANEOUS PORT Right 12/16/2024    Procedure: [id number];  Surgeon: Juaquin Sanchez Jr., MD;  Location: [facility];  Service: General;  Laterality: Right;    ROTATOR CUFF REPAIR Right 2005    TUBAL LIGATION       Family History   Problem Relation Name Age of Onset    Breast cancer Maternal Grandfather      Breast cancer Maternal Aunt       Social  History[1]  Review of Systems  Per HPI  Physical Exam     Initial Vitals [05/22/25 1535]   BP Pulse Resp Temp SpO2   99/64 107 20 98.1 °F (36.7 °C) 97 %      MAP       --         Physical Exam    Nursing note and vitals reviewed.  Constitutional: She appears well-developed. She is not diaphoretic. No distress.   HENT:   Head: Normocephalic and atraumatic.   Nose: Nose normal. Mouth/Throat: Oropharynx is clear and moist.   Eyes: EOM are normal. Pupils are equal, round, and reactive to light.   Neck: Neck supple.   Normal range of motion.  Cardiovascular:  Regular rhythm, normal heart sounds and intact distal pulses.           Resting tachycardia   Pulmonary/Chest: Breath sounds normal. No respiratory distress. She has no wheezes.   Abdominal: Abdomen is soft. Bowel sounds are normal. She exhibits no distension. There is no abdominal tenderness. There is no guarding.   Musculoskeletal:         General: Edema present.      Cervical back: Normal range of motion and neck supple.      Comments: Edema and increased swelling to the left lower extremity   Pitting edema 1+  No calf tenderness     Neurological: She is alert and oriented to person, place, and time. No cranial nerve deficit.   Skin: Skin is warm. Capillary refill takes less than 2 seconds.         ED Course   Procedures  Labs Reviewed   INFLUENZA A & B BY MOLECULAR   CULTURE, BLOOD   CULTURE, BLOOD   CBC W/ AUTO DIFFERENTIAL    Narrative:     The following orders were created for panel order CBC auto differential.  Procedure                               Abnormality         Status                     ---------                               -----------         ------                     CBC with Differential[[id number]]                                                        Please view results for these tests on the individual orders.   COMPREHENSIVE METABOLIC PANEL   B-TYPE NATRIURETIC PEPTIDE   TROPONIN I HIGH SENSITIVITY   SARS-COV-2 RNA AMPLIFICATION, QUAL    CBC WITH DIFFERENTIAL   URINALYSIS, REFLEX TO URINE CULTURE   POCT LACTATE        ECG Results              EKG 12-lead (In process)        Collection Time Result Time QRS Duration OHS QTC Calculation    05/22/25 15:27:14 05/22/25 15:47:28 72 455                     In process by Interface, Lab In [facility] (05/22/25 15:47:34)                   Narrative:    Test Reason : R06.02,    Vent. Rate : 112 BPM     Atrial Rate : 112 BPM     P-R Int : 124 ms          QRS Dur :  72 ms      QT Int : 334 ms       P-R-T Axes :  41   3  10 degrees    QTcB Int : 455 ms    Sinus tachycardia  T wave abnormality, consider inferior ischemia  Abnormal ECG  No previous ECGs available    Referred By:            Confirmed By:                                   Imaging Results              X-Ray Chest PA And Lateral (In process)                      US Lower Extremity Veins Left (In process)                      Medications - No data to display  Medical Decision Making  Essence Herrera is a 75 y.o. F with PMHx of malignant breast cancer presents for evaluation of progressive shortness breath over the last 3-4 weeks associated with unilateral leg swelling.  No prior history of blood clot. She reports SOB but no Chest pain or nausea.     Vital signs with tachycardia to 110. BP stable.   Examined to have unilateral left leg swelling with pitting edema.    Workup revealing extensive blood clot to the left lower extremity, right lower extremity also with DVT.  CTA chest showing acute saddle PE.  Patient with elevated troponin concerning for right heart strain, there is no evidence of right heart strain on the CTA.     Due to extensive clot burden we have consulted vascular surgery for evaluation of possible thrombectomy.  Ordered initial dose of Lovenox in the emergency department. Patient will be admitted to medicine for further monitoring and treatment.    Lisa Nichole  [employer] EM/Ped - PGY 5  8:08 PM      Amount and/or Complexity of Data  Reviewed  Labs:  Decision-making details documented in ED Course.  Radiology: ordered. Decision-making details documented in ED Course.  ECG/medicine tests:  Decision-making details documented in ED Course.    Risk  Prescription drug management.  Decision regarding hospitalization.               ED Course as of 05/22/25 2009   Thu May 22, 2025   1754 WBC(!): 3.54 [EA]   1754 Hemoglobin(!): 10.8 [EA]   1754 Neut #: 2.7  Not neutropenic [EA]   1755 X-Ray Chest PA And Lateral  Impression:     No acute findings.   [EA]   1829 Reviewed chart with no brain mets in 12/2024 [EA]   1839 Troponin I High Sensitivity(!!): 55.9 [EA]   1846 CTA Chest Non-Coronary (PE Studies)  Per my read noted to have large vessel pulmonary embolism in the right pulmonary artery. [EA]   1848 EKG 12-lead  EKG with sinus tach at rate 112  no STEMI  flattened t-waves compared to prior   WTc 455 [EA]   2009 CTA Chest Non-Coronary (PE Studies)  Impression:     Extensive bilateral multifocal pulmonary thromboembolic disease with saddle pulmonary embolus.  No evidence of right heart strain.  Airspace disease peripheral aspect right upper, middle, and lower lobe, likely pulmonary infarct. Follow-up to resolution.  Notified ordering physician of critical findings at time of dictation.      [EA]      ED Course User Index  [EA] Lisa Nichole MD                           Clinical Impression:  Final diagnoses:  [R06.02] Shortness of breath  [R06.02] SOB (shortness of breath)  [M79.89] Leg swelling                         [1]  Social History  Tobacco Use    Smoking status: Never    Smokeless tobacco: Never   Substance Use Topics    Alcohol use: Not Currently    Drug use: Never      Lisa Nichole MD  Resident  05/22/25 2009

## 2025-05-22 NOTE — Clinical Note
An angiography was performed of the pulmonary artery via hand injection with 2. The status of comorbities. (See ED/admit documents)

## 2025-05-22 NOTE — Clinical Note
Grant wire and jr35 cath inserted to reposition 24 Inari catheter, then grant wire removed, amplatz wire inserted through jr3.5, jr3.5 removed, inari 24fr catheter repositioned to right lower pulmonary artery

## 2025-05-22 NOTE — FIRST PROVIDER EVALUATION
Emergency Department TeleTriage Encounter Note      CHIEF COMPLAINT    Chief Complaint   Patient presents with    Shortness of Breath     Dyspnea on exertion for a few weeks. Patient states that it is worse over the last couple of days.  Patient also was BLE edema, which is a symptom that she has had for some time.  Dr Chung sent patient here for increasing SOB any tachycardia.  Patient had chemotherapy 3 weeks ago.  She is preparing for lumpectomy on 6/12/25    Leg Swelling    Tachycardia       VITAL SIGNS   Initial Vitals [05/22/25 1535]   BP Pulse Resp Temp SpO2   99/64 107 20 98.1 °F (36.7 °C) 97 %      MAP       --            ALLERGIES    Review of patient's allergies indicates:  No Known Allergies    PROVIDER TRIAGE NOTE  75 year old female presents to the ER with complaints of SOB, left lower leg swelling, fatigue and tachycardia. Sent from her Dr. Chung for further evaluation. Hx of triple negative breast cancer who recently completed chemo 3 weeks ago. Recent labs suggested elevated WBC cough.  Reports cough that is non productive. No hx of DVT or PE. Denies chest pain.       AAOx3, respirations even and non- labored, stable vitals, normal coloration of skin, sitting upright in triage chair, appears in no acute distress.          ORDERS  Labs Reviewed - No data to display    ED Orders (720h ago, onward)      Start Ordered     Status Ordering Provider    05/22/25 1526 05/22/25 1525  EKG 12-lead  Once         In process NASREEN SINGER              Virtual Visit Note: The provider triage portion of this emergency department evaluation and documentation was performed via [employer], a HIPAA-compliant telemedicine application, in concert with a tele-presenter in the room. A face to face patient evaluation with one of my colleagues will occur once the patient is placed in an emergency department room.      DISCLAIMER: This note was prepared with M*Modal voice recognition transcription software. Leo  syntax, mangled pronouns, and other bizarre constructions may be attributed to that software system.

## 2025-05-23 ENCOUNTER — CLINICAL SUPPORT (OUTPATIENT)
Dept: CARDIOLOGY | Facility: HOSPITAL | Age: 75
DRG: 164 | End: 2025-05-23
Attending: INTERNAL MEDICINE
Payer: MEDICARE

## 2025-05-23 VITALS — WEIGHT: 190.5 LBS | HEIGHT: 65 IN | BODY MASS INDEX: 31.74 KG/M2

## 2025-05-23 PROBLEM — R79.89 ELEVATED TROPONIN: Status: ACTIVE | Noted: 2025-05-23

## 2025-05-23 PROBLEM — I26.92 ACUTE SADDLE PULMONARY EMBOLISM WITHOUT ACUTE COR PULMONALE: Status: ACTIVE | Noted: 2025-05-23

## 2025-05-23 PROBLEM — I82.403 ACUTE DEEP VEIN THROMBOSIS (DVT) OF BOTH LOWER EXTREMITIES: Status: ACTIVE | Noted: 2025-05-23

## 2025-05-23 PROBLEM — I82.4Y2 ACUTE DEEP VEIN THROMBOSIS (DVT) OF PROXIMAL VEIN OF LEFT LOWER EXTREMITY: Status: ACTIVE | Noted: 2025-05-23

## 2025-05-23 PROBLEM — N30.00 ACUTE CYSTITIS WITHOUT HEMATURIA: Status: ACTIVE | Noted: 2025-05-23

## 2025-05-23 LAB
ABSOLUTE EOSINOPHIL (SMH): 0.07 K/UL
ABSOLUTE MONOCYTE (SMH): 0.45 K/UL (ref 0.3–1)
ABSOLUTE NEUTROPHIL COUNT (SMH): 2.7 K/UL (ref 1.8–7.7)
ANION GAP (SMH): 9 MMOL/L (ref 8–16)
APICAL FOUR CHAMBER EJECTION FRACTION: 60 %
BASOPHILS # BLD AUTO: 0.03 K/UL
BASOPHILS NFR BLD AUTO: 0.8 %
BSA FOR ECHO PROCEDURE: 1.99 M2
BUN SERPL-MCNC: 22 MG/DL (ref 8–23)
CALCIUM SERPL-MCNC: 9.1 MG/DL (ref 8.7–10.5)
CHLORIDE SERPL-SCNC: 99 MMOL/L (ref 95–110)
CO2 SERPL-SCNC: 26 MMOL/L (ref 23–29)
CREAT SERPL-MCNC: 0.7 MG/DL (ref 0.5–1.4)
CV ECHO LV RWT: 0.41 CM
ECHO LV POSTERIOR WALL: 0.7 CM (ref 0.6–1.1)
ERYTHROCYTE [DISTWIDTH] IN BLOOD BY AUTOMATED COUNT: 14.7 % (ref 11.5–14.5)
FRACTIONAL SHORTENING: 41.2 % (ref 28–44)
GFR SERPLBLD CREATININE-BSD FMLA CKD-EPI: >60 ML/MIN/1.73/M2
GLUCOSE SERPL-MCNC: 93 MG/DL (ref 70–110)
HCT VFR BLD AUTO: 31.4 % (ref 37–48.5)
HGB BLD-MCNC: 10.2 GM/DL (ref 12–16)
IMM GRANULOCYTES # BLD AUTO: 0.02 K/UL (ref 0–0.04)
IMM GRANULOCYTES NFR BLD AUTO: 0.5 % (ref 0–0.5)
INTERVENTRICULAR SEPTUM: 0.9 CM (ref 0.6–1.1)
IVC DIAMETER: 1 CM
LEFT INTERNAL DIMENSION IN SYSTOLE: 2 CM (ref 2.1–4)
LEFT VENTRICLE DIASTOLIC VOLUME INDEX: 23.71 ML/M2
LEFT VENTRICLE DIASTOLIC VOLUME: 46 ML
LEFT VENTRICLE END DIASTOLIC VOLUME APICAL 4 CHAMBER: 43.9 ML
LEFT VENTRICLE MASS INDEX: 37.1 G/M2
LEFT VENTRICLE SYSTOLIC VOLUME INDEX: 6.7 ML/M2
LEFT VENTRICLE SYSTOLIC VOLUME: 13 ML
LEFT VENTRICULAR INTERNAL DIMENSION IN DIASTOLE: 3.4 CM (ref 3.5–6)
LEFT VENTRICULAR MASS: 71.9 G
LVED V (TEICH): 46.4 ML
LVES V (TEICH): 13.4 ML
LYMPHOCYTES # BLD AUTO: 0.47 K/UL (ref 1–4.8)
MAGNESIUM SERPL-MCNC: 2.1 MG/DL (ref 1.6–2.6)
MCH RBC QN AUTO: 31 PG (ref 27–31)
MCHC RBC AUTO-ENTMCNC: 32.5 G/DL (ref 32–36)
MCV RBC AUTO: 95 FL (ref 82–98)
NUCLEATED RBC (/100WBC) (SMH): 0 /100 WBC
PHOSPHATE SERPL-MCNC: 3.5 MG/DL (ref 2.7–4.5)
PISA TR MAX VEL: 2.9 M/S
PLATELET # BLD AUTO: 230 K/UL (ref 150–450)
PMV BLD AUTO: 10.1 FL (ref 9.2–12.9)
POTASSIUM SERPL-SCNC: 3.7 MMOL/L (ref 3.5–5.1)
RA PRESSURE ESTIMATED: 3 MMHG
RBC # BLD AUTO: 3.29 M/UL (ref 4–5.4)
RELATIVE EOSINOPHIL (SMH): 1.9 % (ref 0–8)
RELATIVE LYMPHOCYTE (SMH): 12.7 % (ref 18–48)
RELATIVE MONOCYTE (SMH): 12.2 % (ref 4–15)
RELATIVE NEUTROPHIL (SMH): 71.9 % (ref 38–73)
RV TB RVSP: 6 MMHG
SODIUM SERPL-SCNC: 134 MMOL/L (ref 136–145)
TR MAX PG: 34 MMHG
TRICUSPID ANNULAR PLANE SYSTOLIC EXCURSION: 1.6 CM
TROPONIN HIGH SENSITIVE (SMH): 59.5 PG/ML
TROPONIN HIGH SENSITIVE (SMH): 70.2 PG/ML
TV REST PULMONARY ARTERY PRESSURE: 37 MMHG
WBC # BLD AUTO: 3.69 K/UL (ref 3.9–12.7)
Z-SCORE OF LEFT VENTRICULAR DIMENSION IN END DIASTOLE: -4.83
Z-SCORE OF LEFT VENTRICULAR DIMENSION IN END SYSTOLE: -4.17

## 2025-05-23 PROCEDURE — 93306 TTE W/DOPPLER COMPLETE: CPT

## 2025-05-23 PROCEDURE — B31SYZZ FLUOROSCOPY OF RIGHT PULMONARY ARTERY USING OTHER CONTRAST: ICD-10-PCS | Performed by: SURGERY

## 2025-05-23 PROCEDURE — 93308 TTE F-UP OR LMTD: CPT | Mod: 26,,, | Performed by: INTERNAL MEDICINE

## 2025-05-23 PROCEDURE — 36415 COLL VENOUS BLD VENIPUNCTURE: CPT

## 2025-05-23 PROCEDURE — 94761 N-INVAS EAR/PLS OXIMETRY MLT: CPT

## 2025-05-23 PROCEDURE — 63600175 PHARM REV CODE 636 W HCPCS: Performed by: SURGERY

## 2025-05-23 PROCEDURE — 36014 PLACE CATHETER IN ARTERY: CPT | Mod: 50 | Performed by: SURGERY

## 2025-05-23 PROCEDURE — B31TYZZ FLUOROSCOPY OF LEFT PULMONARY ARTERY USING OTHER CONTRAST: ICD-10-PCS | Performed by: SURGERY

## 2025-05-23 PROCEDURE — C1887 CATHETER, GUIDING: HCPCS | Performed by: SURGERY

## 2025-05-23 PROCEDURE — C1757 CATH, THROMBECTOMY/EMBOLECT: HCPCS | Performed by: SURGERY

## 2025-05-23 PROCEDURE — 93321 DOPPLER ECHO F-UP/LMTD STD: CPT | Mod: 26,,, | Performed by: INTERNAL MEDICINE

## 2025-05-23 PROCEDURE — 25000003 PHARM REV CODE 250: Performed by: SURGERY

## 2025-05-23 PROCEDURE — 99900031 HC PATIENT EDUCATION (STAT)

## 2025-05-23 PROCEDURE — 84484 ASSAY OF TROPONIN QUANT: CPT

## 2025-05-23 PROCEDURE — 63600175 PHARM REV CODE 636 W HCPCS: Performed by: INTERNAL MEDICINE

## 2025-05-23 PROCEDURE — 83735 ASSAY OF MAGNESIUM: CPT

## 2025-05-23 PROCEDURE — 99153 MOD SED SAME PHYS/QHP EA: CPT | Performed by: SURGERY

## 2025-05-23 PROCEDURE — 25000003 PHARM REV CODE 250: Performed by: INTERNAL MEDICINE

## 2025-05-23 PROCEDURE — 02CR3ZZ EXTIRPATION OF MATTER FROM LEFT PULMONARY ARTERY, PERCUTANEOUS APPROACH: ICD-10-PCS | Performed by: SURGERY

## 2025-05-23 PROCEDURE — 84100 ASSAY OF PHOSPHORUS: CPT

## 2025-05-23 PROCEDURE — 37184 PRIM ART M-THRMBC 1ST VSL: CPT | Mod: 50 | Performed by: SURGERY

## 2025-05-23 PROCEDURE — C1769 GUIDE WIRE: HCPCS | Performed by: SURGERY

## 2025-05-23 PROCEDURE — 85025 COMPLETE CBC W/AUTO DIFF WBC: CPT

## 2025-05-23 PROCEDURE — 80048 BASIC METABOLIC PNL TOTAL CA: CPT

## 2025-05-23 PROCEDURE — 99900035 HC TECH TIME PER 15 MIN (STAT)

## 2025-05-23 PROCEDURE — 94799 UNLISTED PULMONARY SVC/PX: CPT

## 2025-05-23 PROCEDURE — 99223 1ST HOSP IP/OBS HIGH 75: CPT | Mod: ,,, | Performed by: INTERNAL MEDICINE

## 2025-05-23 PROCEDURE — 99152 MOD SED SAME PHYS/QHP 5/>YRS: CPT | Performed by: SURGERY

## 2025-05-23 PROCEDURE — 63600175 PHARM REV CODE 636 W HCPCS

## 2025-05-23 PROCEDURE — C1894 INTRO/SHEATH, NON-LASER: HCPCS | Performed by: SURGERY

## 2025-05-23 PROCEDURE — 20000000 HC ICU ROOM

## 2025-05-23 PROCEDURE — 27000221 HC OXYGEN, UP TO 24 HOURS

## 2025-05-23 PROCEDURE — 02CQ3ZZ EXTIRPATION OF MATTER FROM RIGHT PULMONARY ARTERY, PERCUTANEOUS APPROACH: ICD-10-PCS | Performed by: SURGERY

## 2025-05-23 PROCEDURE — 93325 DOPPLER ECHO COLOR FLOW MAPG: CPT | Mod: 26,,, | Performed by: INTERNAL MEDICINE

## 2025-05-23 RX ORDER — IBUPROFEN 200 MG
16 TABLET ORAL
Status: DISCONTINUED | OUTPATIENT
Start: 2025-05-23 | End: 2025-05-26 | Stop reason: HOSPADM

## 2025-05-23 RX ORDER — ACETAMINOPHEN 325 MG/1
650 TABLET ORAL EVERY 4 HOURS PRN
Status: DISCONTINUED | OUTPATIENT
Start: 2025-05-23 | End: 2025-05-23

## 2025-05-23 RX ORDER — ALUMINUM HYDROXIDE, MAGNESIUM HYDROXIDE, AND SIMETHICONE 1200; 120; 1200 MG/30ML; MG/30ML; MG/30ML
30 SUSPENSION ORAL 4 TIMES DAILY PRN
Status: DISCONTINUED | OUTPATIENT
Start: 2025-05-23 | End: 2025-05-26 | Stop reason: HOSPADM

## 2025-05-23 RX ORDER — PROCHLORPERAZINE EDISYLATE 5 MG/ML
5 INJECTION INTRAMUSCULAR; INTRAVENOUS EVERY 6 HOURS PRN
Status: DISCONTINUED | OUTPATIENT
Start: 2025-05-23 | End: 2025-05-26 | Stop reason: HOSPADM

## 2025-05-23 RX ORDER — AMOXICILLIN 250 MG
1 CAPSULE ORAL 2 TIMES DAILY
Status: DISCONTINUED | OUTPATIENT
Start: 2025-05-23 | End: 2025-05-23

## 2025-05-23 RX ORDER — MUPIROCIN 20 MG/G
OINTMENT TOPICAL 2 TIMES DAILY
Status: DISCONTINUED | OUTPATIENT
Start: 2025-05-23 | End: 2025-05-26 | Stop reason: HOSPADM

## 2025-05-23 RX ORDER — HEPARIN SODIUM 10000 [USP'U]/ML
INJECTION, SOLUTION INTRAVENOUS; SUBCUTANEOUS
Status: DISCONTINUED | OUTPATIENT
Start: 2025-05-23 | End: 2025-05-23 | Stop reason: HOSPADM

## 2025-05-23 RX ORDER — ACETAMINOPHEN 500 MG
1000 TABLET ORAL EVERY 6 HOURS PRN
Status: DISCONTINUED | OUTPATIENT
Start: 2025-05-23 | End: 2025-05-26 | Stop reason: HOSPADM

## 2025-05-23 RX ORDER — CEFTRIAXONE 2 G/1
2 INJECTION, POWDER, FOR SOLUTION INTRAMUSCULAR; INTRAVENOUS
Status: DISCONTINUED | OUTPATIENT
Start: 2025-05-23 | End: 2025-05-24

## 2025-05-23 RX ORDER — HYDROMORPHONE HYDROCHLORIDE 1 MG/ML
1 INJECTION, SOLUTION INTRAMUSCULAR; INTRAVENOUS; SUBCUTANEOUS EVERY 4 HOURS PRN
Refills: 0 | Status: DISCONTINUED | OUTPATIENT
Start: 2025-05-23 | End: 2025-05-26 | Stop reason: HOSPADM

## 2025-05-23 RX ORDER — TALC
9 POWDER (GRAM) TOPICAL NIGHTLY PRN
Status: DISCONTINUED | OUTPATIENT
Start: 2025-05-23 | End: 2025-05-26 | Stop reason: HOSPADM

## 2025-05-23 RX ORDER — SODIUM CHLORIDE 9 MG/ML
INJECTION, SOLUTION INTRAVENOUS
Status: DISCONTINUED | OUTPATIENT
Start: 2025-05-23 | End: 2025-05-26 | Stop reason: HOSPADM

## 2025-05-23 RX ORDER — FENTANYL CITRATE 50 UG/ML
INJECTION, SOLUTION INTRAMUSCULAR; INTRAVENOUS
Status: DISCONTINUED | OUTPATIENT
Start: 2025-05-23 | End: 2025-05-23 | Stop reason: HOSPADM

## 2025-05-23 RX ORDER — LANOLIN ALCOHOL/MO/W.PET/CERES
800 CREAM (GRAM) TOPICAL
Status: DISCONTINUED | OUTPATIENT
Start: 2025-05-23 | End: 2025-05-26 | Stop reason: HOSPADM

## 2025-05-23 RX ORDER — MIDAZOLAM HYDROCHLORIDE 1 MG/ML
INJECTION INTRAMUSCULAR; INTRAVENOUS
Status: DISCONTINUED | OUTPATIENT
Start: 2025-05-23 | End: 2025-05-23 | Stop reason: HOSPADM

## 2025-05-23 RX ORDER — SODIUM CHLORIDE 0.9 % (FLUSH) 0.9 %
10 SYRINGE (ML) INJECTION EVERY 12 HOURS PRN
Status: DISCONTINUED | OUTPATIENT
Start: 2025-05-23 | End: 2025-05-26 | Stop reason: HOSPADM

## 2025-05-23 RX ORDER — GLUCAGON 1 MG
1 KIT INJECTION
Status: DISCONTINUED | OUTPATIENT
Start: 2025-05-23 | End: 2025-05-26 | Stop reason: HOSPADM

## 2025-05-23 RX ORDER — LIDOCAINE HYDROCHLORIDE 10 MG/ML
INJECTION, SOLUTION INFILTRATION; PERINEURAL
Status: DISCONTINUED | OUTPATIENT
Start: 2025-05-23 | End: 2025-05-23 | Stop reason: HOSPADM

## 2025-05-23 RX ORDER — ENOXAPARIN SODIUM 100 MG/ML
1 INJECTION SUBCUTANEOUS EVERY 12 HOURS
Status: DISCONTINUED | OUTPATIENT
Start: 2025-05-23 | End: 2025-05-26 | Stop reason: HOSPADM

## 2025-05-23 RX ORDER — ONDANSETRON HYDROCHLORIDE 2 MG/ML
4 INJECTION, SOLUTION INTRAVENOUS EVERY 6 HOURS PRN
Status: DISCONTINUED | OUTPATIENT
Start: 2025-05-23 | End: 2025-05-26 | Stop reason: HOSPADM

## 2025-05-23 RX ORDER — NITROGLYCERIN 0.4 MG/1
0.4 TABLET SUBLINGUAL EVERY 5 MIN PRN
Status: DISCONTINUED | OUTPATIENT
Start: 2025-05-23 | End: 2025-05-26 | Stop reason: HOSPADM

## 2025-05-23 RX ORDER — CEFTRIAXONE 1 G/1
1 INJECTION, POWDER, FOR SOLUTION INTRAMUSCULAR; INTRAVENOUS ONCE
Status: DISCONTINUED | OUTPATIENT
Start: 2025-05-23 | End: 2025-05-23

## 2025-05-23 RX ORDER — POTASSIUM CHLORIDE 7.45 MG/ML
10 INJECTION INTRAVENOUS
Status: ACTIVE | OUTPATIENT
Start: 2025-05-23 | End: 2025-05-23

## 2025-05-23 RX ORDER — IPRATROPIUM BROMIDE AND ALBUTEROL SULFATE 2.5; .5 MG/3ML; MG/3ML
3 SOLUTION RESPIRATORY (INHALATION) EVERY 6 HOURS PRN
Status: DISCONTINUED | OUTPATIENT
Start: 2025-05-23 | End: 2025-05-26 | Stop reason: HOSPADM

## 2025-05-23 RX ORDER — NALOXONE HCL 0.4 MG/ML
0.02 VIAL (ML) INJECTION
Status: DISCONTINUED | OUTPATIENT
Start: 2025-05-23 | End: 2025-05-26 | Stop reason: HOSPADM

## 2025-05-23 RX ORDER — AMOXICILLIN 250 MG
1 CAPSULE ORAL 2 TIMES DAILY PRN
Status: DISCONTINUED | OUTPATIENT
Start: 2025-05-23 | End: 2025-05-26 | Stop reason: HOSPADM

## 2025-05-23 RX ORDER — IBUPROFEN 200 MG
24 TABLET ORAL
Status: DISCONTINUED | OUTPATIENT
Start: 2025-05-23 | End: 2025-05-26 | Stop reason: HOSPADM

## 2025-05-23 RX ADMIN — HYDROMORPHONE HYDROCHLORIDE 1 MG: 1 INJECTION, SOLUTION INTRAMUSCULAR; INTRAVENOUS; SUBCUTANEOUS at 07:05

## 2025-05-23 RX ADMIN — POTASSIUM CHLORIDE 10 MEQ: 7.46 INJECTION, SOLUTION INTRAVENOUS at 09:05

## 2025-05-23 RX ADMIN — CEFTRIAXONE SODIUM 2 G: 2 INJECTION, POWDER, FOR SOLUTION INTRAMUSCULAR; INTRAVENOUS at 03:05

## 2025-05-23 RX ADMIN — MUPIROCIN 1 G: 20 OINTMENT TOPICAL at 09:05

## 2025-05-23 RX ADMIN — HYDROMORPHONE HYDROCHLORIDE 1 MG: 1 INJECTION, SOLUTION INTRAMUSCULAR; INTRAVENOUS; SUBCUTANEOUS at 11:05

## 2025-05-23 RX ADMIN — POTASSIUM CHLORIDE 10 MEQ: 7.46 INJECTION, SOLUTION INTRAVENOUS at 01:05

## 2025-05-23 RX ADMIN — ENOXAPARIN SODIUM 90 MG: 100 INJECTION SUBCUTANEOUS at 09:05

## 2025-05-23 NOTE — CONSULTS
[facility]  Hematology/Oncology  Consult Note    Patient Name: Essence Herrera  MRN: [id number]  Admission Date: 5/22/2025  Hospital Length of Stay: 0 days  Code Status: Full Code   Attending Provider: Jorge Lo MD  Consulting Provider: Aurash Khoobehi, MD  Primary Care Physician: Jose Manuel Chung MD  Principal Problem:Acute saddle pulmonary embolism without acute cor pulmonale    Inpatient consult to Hematology/Oncology  Consult performed by: Khoobehi, Aurash, MD  Consult ordered by: Khoobehi, Ali, MD        Subjective:     HPI:  This is a 75-year-old female well known to me with a malignant neoplasm of the right breast, triple negative, [id number], s/p neoadjuvant ACT.  Patient is scheduled for lumpectomy on 6/12/25.  She was admitted after presenting with complaints of shortness of breath for the past few weeks that had gradually worsened.  Patient was found to have a saddle pulmonary embolism and bilateral lower extremity DVTs.  She has since been admitted and plans to have a thrombectomy today.  I saw her at bedside.  She is in good spirits.  She is feeling better but continues have some shortness of breath.    Oncology Treatment Plan:   OP BREAST DOSE-DENSE AC-T (DOXORUBICIN CYCLOPHOSPHAMIDE Q2W FOLLOWED BY PACLITAXEL Q2W)    Medications:  Continuous Infusions:  Scheduled Meds:   cefTRIAXone (Rocephin) IV (PEDS and ADULTS)  2 g Intravenous Q24H    enoxparin  1 mg/kg Subcutaneous Q12H (prophylaxis, 0900/2100)    mupirocin   Nasal BID     PRN Meds:  Current Facility-Administered Medications:     acetaminophen, 1,000 mg, Oral, Q6H PRN    albuterol-ipratropium, 3 mL, Nebulization, Q6H PRN    aluminum-magnesium hydroxide-simethicone, 30 mL, Oral, QID PRN    dextrose 50%, 12.5 g, Intravenous, PRN    dextrose 50%, 25 g, Intravenous, PRN    glucagon (human recombinant), 1 mg, Intramuscular, PRN    glucose, 16 g, Oral, PRN    glucose, 24 g, Oral, PRN    HYDROmorphone, 1 mg, Intravenous, Q4H  PRN    magnesium oxide, 800 mg, Oral, PRN    magnesium oxide, 800 mg, Oral, PRN    melatonin, 9 mg, Oral, Nightly PRN    naloxone, 0.02 mg, Intravenous, PRN    nitroGLYCERIN, 0.4 mg, Sublingual, Q5 Min PRN    ondansetron, 4 mg, Intravenous, Q6H PRN    potassium bicarbonate, 35 mEq, Oral, PRN    potassium bicarbonate, 50 mEq, Oral, PRN    potassium bicarbonate, 60 mEq, Oral, PRN    prochlorperazine, 5 mg, Intravenous, Q6H PRN    senna-docusate, 1 tablet, Oral, BID PRN    sodium chloride 0.9%, 10 mL, Intravenous, Q12H PRN     Review of patient's allergies indicates:  No Known Allergies     Past Medical History:   Diagnosis Date    Hyperlipidemia     Hypertension      Past Surgical History:   Procedure Laterality Date    BUNIONECTOMY Bilateral 1996    CARPAL TUNNEL RELEASE Bilateral 1998    DILATION AND CURETTAGE OF UTERUS      HYSTERECTOMY      TVH with conservation of the ovaries    INSERTION OF TUNNELED CENTRAL VENOUS CATHETER (CVC) WITH SUBCUTANEOUS PORT Right 12/16/2024    Procedure: [id number];  Surgeon: Juaquin Sanchez Jr., MD;  Location: [facility] OR;  Service: General;  Laterality: Right;    ROTATOR CUFF REPAIR Right 2005    TUBAL LIGATION       Family History       Problem Relation (Age of Onset)    Breast cancer Maternal Grandfather, Maternal Aunt          Tobacco Use    Smoking status: Never    Smokeless tobacco: Never   Vaping Use    Vaping status: Never Used   Substance and Sexual Activity    Alcohol use: Not Currently    Drug use: Never    Sexual activity: Not Currently       Review of Systems   Constitutional: Negative.    HENT: Negative.     Eyes: Negative.    Respiratory:  Positive for shortness of breath.    Cardiovascular: Negative.    Gastrointestinal: Negative.    Endocrine: Negative.    Genitourinary: Negative.    Musculoskeletal: Negative.    Integumentary:  Negative.   Neurological: Negative.    Hematological: Negative.    Psychiatric/Behavioral: Negative.       Objective:     Vital  Signs (Most Recent):  Temp: 98.3 °F (36.8 °C) (05/23/25 0705)  Pulse: 82 (05/23/25 1300)  Resp: (!) 30 (05/23/25 1300)  BP: 100/60 (05/23/25 1300)  SpO2: 96 % (05/23/25 1300) Vital Signs (24h Range):  Temp:  [98.3 °F (36.8 °C)-99 °F (37.2 °C)] 98.3 °F (36.8 °C)  Pulse:  [] 82  Resp:  [22-42] 30  SpO2:  [91 %-98 %] 96 %  BP: ()/(52-65) 100/60     Weight: 86.4 kg (190 lb 7.6 oz)  Body mass index is 31.7 kg/m².  Body surface area is 1.99 meters squared.      Intake/Output Summary (Last 24 hours) at 5/23/2025 1646  Last data filed at 5/23/2025 1326  Gross per 24 hour   Intake 0 ml   Output --   Net 0 ml       Physical Exam  Constitutional:       Appearance: Normal appearance.   HENT:      Head: Normocephalic and atraumatic.      Nose: Nose normal.      Mouth/Throat:      Mouth: Mucous membranes are moist.      Pharynx: Oropharynx is clear.   Eyes:      Conjunctiva/sclera: Conjunctivae normal.   Cardiovascular:      Rate and Rhythm: Normal rate and regular rhythm.      Heart sounds: Normal heart sounds.   Pulmonary:      Effort: Pulmonary effort is normal.      Breath sounds: Normal breath sounds.   Abdominal:      General: Abdomen is flat. Bowel sounds are normal.      Palpations: Abdomen is soft.   Musculoskeletal:         General: Normal range of motion.      Cervical back: Normal range of motion and neck supple.      Right lower leg: Edema present.      Left lower leg: Edema present.   Skin:     General: Skin is warm and dry.   Neurological:      General: No focal deficit present.      Mental Status: She is alert and oriented to person, place, and time. Mental status is at baseline.   Psychiatric:         Mood and Affect: Mood normal.         Significant Labs:   CBC:   Recent Labs   Lab 05/22/25  1716 05/23/25  0916   WBC 3.54* 3.69*   HGB 10.8* 10.2*   HCT 32.7* 31.4*    230    and CMP:   Recent Labs   Lab 05/22/25  1716 05/23/25  0916   * 134*   K 3.4* 3.7   CL 97 99   CO2 24 26     93   BUN 25* 22   CREATININE 0.8 0.7   CALCIUM 9.2 9.1   PROT 6.5  --    ALBUMIN 3.4*  --    BILITOT 0.7  --    ALKPHOS 78  --    AST 21  --    ALT 17  --    ANIONGAP 11 9       Diagnostic Results:  I have reviewed all pertinent imaging results/findings within the past 24 hours.    Assessment/Plan:     Active Diagnoses:    Diagnosis Date Noted POA    PRINCIPAL PROBLEM:  Acute saddle pulmonary embolism without acute cor pulmonale [I26.92] 05/23/2025 Yes    Acute deep vein thrombosis (DVT) of both lower extremities [I82.403] 05/23/2025 Yes    Elevated troponin [R79.89] 05/23/2025 Yes    Acute cystitis without hematuria [N30.00] 05/23/2025 Yes    Severe obesity (BMI 35.0-39.9) with comorbidity [E66.01] 12/09/2024 Yes    Essential hypertension [I10] 02/18/2021 Yes    Hyperlipidemia [E78.5] 02/18/2021 Yes      Problems Resolved During this Admission:       Saddle pulmonary embolism and bilateral lower extremity DVT   -patient will have thrombectomy today, going right now   -will need prolonged anticoagulation afterwards, okay to have a DOAC on discharge  -discussed with patient that this may delay her upcoming lumpectomy, but we will discuss this again when she returns to clinic after the hospitalization    Malignant neoplasm of the right breast   -s/p neoadjuvant chemotherapy for triple negative stage I breast cancer  -scheduled for lumpectomy 6/12/25  -will see patient in clinic prior to this to discuss further and discuss with Dr. Sanchez as well    Thank you for your consult. I will follow-up with patient. Please contact us if you have any additional questions.    Aurash Khoobehi, MD  Hematology/Oncology  [facility]

## 2025-05-23 NOTE — ASSESSMENT & PLAN NOTE
-initial troponin 55.9, repeat troponin 70.2 continue to trend possibly due to demand   -ECG with sinus tachycardia and T-wave abnormality  -remains free of chest pain  -sublingual nitroglycerin PRN  -ECG p.r.n. chest pain  -continuous cardiac monitoring  -supplemental oxygen PRN  -no aspirin at this time due to possible surgery in a.m.  -on therapeutic Lovenox

## 2025-05-23 NOTE — H&P
[facility] - Emergency Dept  Hospital Medicine  History & Physical    Patient Name: Essence Herrera  MRN: [id number]  Patient Class: IP- Inpatient  Admission Date: 5/22/2025  Attending Physician: Cheikh Rizzo MD   Primary Care Provider: Jose Manuel Chung MD         Patient information was obtained from patient, relative(s), past medical records, and ER records.     Subjective:     Principal Problem:Acute saddle pulmonary embolism without acute cor pulmonale    Chief Complaint:   Chief Complaint   Patient presents with    Shortness of Breath     Dyspnea on exertion for a few weeks. Patient states that it is worse over the last couple of days.  Patient also was BLE edema, which is a symptom that she has had for some time.  Dr Chung sent patient here for increasing SOB any tachycardia.  Patient had chemotherapy 3 weeks ago.  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 She reports left lower extremity swelling and worsening shortness of breath with exertion for the past 3 days. She states she was told by her PCP to report to the ED for increasing shortness of breath and tachycardia. She is preparing for a lumpectomy in June.  She denies any chest pain, fever, chills, dizziness, headaches, lightheadedness, numbness/tingling, bowel/urinary complications, abdominal pain, nausea, or vomiting.  ED workup includes:  CTA chest with extensive bilateral multifocal pulmonary thromboembolic disease with saddle pulmonary embolus without any evidence of right heart strain, airspace disease peripheral aspect right upper, middle, and  lower lobe, likely pulmonary infarct. Bilateral lower extremity ultrasound with extensive left lower extremity DVT and right popliteal vein DVT.  Chest x-ray no acute findings. CBC white count 3.5 and H&H 10.8/32.7.  CMP with sodium 132, potassium 3.4, BUN 25, and albumin 3.5 otherwise unremarkable.  Elevated troponin initial 55.9, repeat 70.2, continue to trend.  ECG with sinus tachycardia 112 with some T-wave abnormality.  Echocardiogram pending.  Urinalysis positive nitrites and elevated WBCs > 100.  Started on Rocephin and urine cultures pending.  Blood cultures pending, lactic acid 1.8.  ED MD notified Dr. Khoobehi, with vascular surgery, started on therapeutic Lovenox and placed NPO. Admitted to hospital medicine for further management and treatment.                         Past Medical History:   Diagnosis Date    Hyperlipidemia      Hypertension                 Past Surgical History:   Procedure Laterality Date    BUNIONECTOMY Bilateral 1996    CARPAL TUNNEL RELEASE Bilateral 1998    DILATION AND CURETTAGE OF UTERUS        HYSTERECTOMY         TVH with conservation of the ovaries    INSERTION OF TUNNELED CENTRAL VENOUS CATHETER (CVC) WITH SUBCUTANEOUS PORT Right 12/16/2024     Procedure: [id number]-[id number];  Surgeon: Juaquin Sanchez Jr., MD;  Location: [facility];  Service: General;  Laterality: Right;    ROTATOR CUFF REPAIR Right 2005    TUBAL LIGATION             Review of patient's allergies indicates:  No Known Allergies     No current facility-administered medications on file prior to encounter.           Current Outpatient Medications on File Prior to Encounter   Medication Sig    ondansetron (ZOFRAN-ODT) 8 MG TbDL Take 1 tablet (8 mg total) by mouth every 8 (eight) hours as needed (Nausea).    prochlorperazine (COMPAZINE) 10 MG tablet Take 1 tablet (10 mg total) by mouth every 6 (six) hours as needed. (Patient taking differently: Take 10 mg by mouth every 6 (six) hours as needed (Nausea).)     traZODone (DESYREL) 50 MG tablet Take 1 tablet (50 mg total) by mouth every evening.    atorvastatin (LIPITOR) 10 MG tablet Take 10 mg by mouth once daily. (Patient not taking: Reported on 5/22/2025)    dexAMETHasone (DECADRON) 4 MG Tab Take 2 tablets (8 mg total) by mouth As instructed. Take 8 mg by mouth daily on days 2, 3 and 4 of chemotherapy cycle. (Patient not taking: Reported on 5/22/2025)    diphenhydrAMINE-aluminum-magnesium hydroxide-simethicone-LIDOcaine viscous HCl 2% Swish and spit 15 mLs every 4 (four) hours as needed. (Patient not taking: Reported on 5/22/2025)    gabapentin (NEURONTIN) 100 MG capsule Take 1 capsule (100 mg total) by mouth 3 (three) times daily. (Patient not taking: Reported on 5/22/2025)    LIDOcaine-prilocaine (EMLA) cream Apply topically as needed (Apply to port site 30 minutes before access as needed).    magic mouthwash diphen/antac/lidoc Swish and spit 15 mLs every 4 (four) hours as needed. (Patient not taking: Reported on 5/22/2025)    MOUNJARO 5 mg/0.5 mL PnIj Inject 5 mg into the skin every 7 days. (Patient not taking: Reported on 5/22/2025)    OLANZapine (ZYPREXA) 5 MG tablet Take 1 tablet (5 mg total) by mouth As instructed. Take 1 tablet by mouth nightly on days 1-4 of each chemotherapy cycle. (Patient not taking: Reported on 5/22/2025)    olmesartan-hydrochlorothiazide (BENICAR HCT) 20-12.5 mg per tablet Take 1 tablet by mouth once daily. (Patient not taking: Reported on 5/22/2025)    traMADoL (ULTRAM) 50 mg tablet Take 1 tablet (50 mg total) by mouth every 6 (six) hours as needed for Pain. (Patient not taking: Reported on 5/22/2025)      Family History         Problem Relation (Age of Onset)     Breast cancer Maternal Grandfather, Maternal Aunt                  Tobacco Use    Smoking status: Never    Smokeless tobacco: Never   Substance and Sexual Activity    Alcohol use: Not Currently    Drug use: Never    Sexual activity: Not Currently      Review of Systems    Constitutional:  Positive for activity change. Negative for appetite change, chills, diaphoresis, fatigue and fever.   HENT:  Negative for congestion.    Respiratory:  Positive for shortness of breath. Negative for cough and wheezing.    Cardiovascular:  Positive for leg swelling. Negative for chest pain.   Gastrointestinal:  Negative for abdominal distention, abdominal pain, nausea and vomiting.   Genitourinary:  Negative for difficulty urinating.   Musculoskeletal:  Positive for arthralgias. Negative for back pain.   Neurological:  Negative for dizziness, seizures, syncope, facial asymmetry, speech difficulty, light-headedness, numbness and headaches.      Objective:      Vital Signs (Most Recent):  Temp: 99 °F (37.2 °C) (05/22/25 2330)  Pulse: 97 (05/23/25 0050)  Resp: (!) 30 (05/23/25 0050)  BP: 103/65 (05/22/25 2330)  SpO2: (!) 92 % (05/23/25 0050) Vital Signs (24h Range):  Temp:  [98.1 °F (36.7 °C)-99 °F (37.2 °C)] 99 °F (37.2 °C)  Pulse:  [] 97  Resp:  [20-30] 30  SpO2:  [92 %-98 %] 92 %  BP: ()/(58-65) 103/65      Weight: 87.1 kg (192 lb)  Body mass index is 32.96 kg/m².     Physical Exam  Vitals and nursing note reviewed.   Constitutional:       Appearance: She is ill-appearing.   Eyes:      Pupils: Pupils are equal, round, and reactive to light.   Cardiovascular:      Rate and Rhythm: Regular rhythm. Tachycardia present.      Pulses: Normal pulses.           Dorsalis pedis pulses are 2+ on the right side and 2+ on the left side.        Posterior tibial pulses are 2+ on the right side and 2+ on the left side.      Heart sounds: Normal heart sounds.   Pulmonary:      Effort: Pulmonary effort is normal. No respiratory distress.      Breath sounds: Examination of the right-lower field reveals decreased breath sounds. Examination of the left-lower field reveals decreased breath sounds. Decreased breath sounds present. No wheezing.   Chest:      Comments: Right chest wall Port-A-Cath  Abdominal:       General: Bowel sounds are normal. There is no distension.      Palpations: Abdomen is soft.      Tenderness: There is no abdominal tenderness. There is no guarding.   Musculoskeletal:      Right lower leg: No edema.      Left lower le+ Edema present.   Skin:     General: Skin is warm and dry.      Capillary Refill: Capillary refill takes less than 2 seconds.   Neurological:      Mental Status: She is oriented to person, place, and time.      GCS: GCS eye subscore is 4. GCS verbal subscore is 5. GCS motor subscore is 6.                  CRANIAL NERVES      CN III, IV, VI   Pupils are equal, round, and reactive to light.        Significant Labs: All pertinent labs within the past 24 hours have been reviewed.     Bilirubin:        Recent Labs   Lab 25  0854 25  1716   BILITOT 0.5 0.7         BMP:       Recent Labs   Lab 25  1716      *   K 3.4*   CL 97   CO2 24   BUN 25*   CREATININE 0.8   CALCIUM 9.2      CBC:       Recent Labs   Lab 25  1716   WBC 3.54*   HGB 10.8*   HCT 32.7*         CMP:       Recent Labs   Lab 25  1716   *   K 3.4*   CL 97   CO2 24      BUN 25*   CREATININE 0.8   CALCIUM 9.2   PROT 6.5   ALBUMIN 3.4*   BILITOT 0.7   ALKPHOS 78   AST 21   ALT 17   ANIONGAP 11      Cardiac Markers:       Recent Labs   Lab 25  1716   *         Urine Studies:       Recent Labs   Lab 25  2333   APPEARANCEUA Hazy*   SPECGRAV >=1.030*   PROTEINUA 1+*   BILIRUBINUA Negative   UROBILINOGEN Negative   LEUKOCYTESUR 3+*   RBCUA 7*   WBCUA >100*   BACTERIA Moderate*         Significant Imaging: I have reviewed all pertinent imaging results/findings within the past 24 hours.     CTA Chest Non-Coronary (PE Studies)  Order: [id number]   Status: Final result       Next appt: 2025 at 11:00 AM in Pre-Admission Testing (PRE-ADMIT, [facility])    Test Result Released: Yes (not seen)    0 Result Notes  Details     Reading Physician Reading Date  Result Priority   Harika Amaya MD  [phone]  5/22/2025 STAT      Narrative & Impression  EXAMINATION:  CTA CHEST NON CORONARY (PE STUDIES)     CLINICAL HISTORY:  Pulmonary embolism (PE) suspected, high prob;     TECHNIQUE:  Low dose axial images, sagittal and coronal reformations were obtained from the thoracic inlet to the lung bases following the IV administration of 100 mL of Omnipaque 350.  Contrast timing was optimized to evaluate the pulmonary arteries.  MIP images were performed.     COMPARISON:  04/01/2025     FINDINGS:  Extensive bilateral multifocal pulmonary thromboembolic disease with saddle pulmonary embolus.  No evidence of right heart strain.  Airspace disease peripheral aspect right upper, middle, and lower lobe, likely pulmonary infarct.  Follow-up to resolution.     No cardiomegaly or pericardial effusion.  Moderate coronary artery atherosclerosis.  Normal caliber aorta.     Shotty mediastinal lymphadenopathy, nonspecific.     T8 vertebral body hemangioma.  Endplate sclerosis T10 and T11 with new vacuum phenomenon T10-11.  Chronic compression fracture deformity L1..     Possible cholelithiasis.  Nonspecific hypodense lesion right hepatic lobe.     Impression:     Extensive bilateral multifocal pulmonary thromboembolic disease with saddle pulmonary embolus.  No evidence of right heart strain.  Airspace disease peripheral aspect right upper, middle, and lower lobe, likely pulmonary infarct. Follow-up to resolution.  Notified ordering physician of critical findings at time of dictation.     Other findings above        Electronically signed by:Harika Amaya  Date:                                            05/22/2025  Time:                                           19:56              Exam Ended: 05/22/25 18:12 CDT Last Resulted: 05/22/25 19:56 CDT      Contains abnormal data US Lower Extremity Veins Left  Order: [id number]   Status: Final result       Next appt: 06/06/2025 at 11:00  AM in Pre-Admission Testing (PRE-ADMIT, [facility])       Dx: Leg swelling    Test Result Released: Yes (not seen)    0 Result Notes  Details     Reading Physician Reading Date Result Priority   Harika Amaya MD  [phone]  5/22/2025 STAT      Narrative & Impression  EXAMINATION:  US LOWER EXTREMITY VEINS LEFT     CLINICAL HISTORY:  Other specified soft tissue disorders     TECHNIQUE:  Duplex and color flow Doppler evaluation and graded compression of the left lower extremity veins was performed.     COMPARISON:  None     FINDINGS:  Left thigh veins: The common femoral, femoral, popliteal, deep femoral veins are thrombosed.  Greater saphenous vein is patent.     Left calf veins: Left posterior tibial vein is thrombosed.     Contralateral right leg: Right popliteal vein is thrombosed.     Miscellaneous: None     Impression:     Extensive left lower extremity DVT.     Right popliteal vein DVT.     This report was flagged in Epic as abnormal.     Notified ordering physician via [employer] secure chat at time of dictation        Electronically signed by:Harika Amaya  Date:                                            05/22/2025  Time:                                           18:13              Exam Ended: 05/22/25 17:54 CDT Last Resulted: 05/22/25 18:13 CDT      X-Ray Chest PA And Lateral  Order: [id number]   Status: Final result       Next appt: 06/06/2025 at 11:00 AM in Pre-Admission Testing (PRE-ADMIT, [facility])       Dx: SOB (shortness of breath)    Test Result Released: Yes (not seen)    0 Result Notes  Details     Reading Physician Reading Date Result Priority   Harika Amaya MD  [phone]  5/22/2025 STAT      Narrative & Impression  EXAMINATION:  XR CHEST PA AND LATERAL     CLINICAL HISTORY:  Shortness of breath     TECHNIQUE:  PA and lateral views of the chest were performed.     COMPARISON:  04/01/2025     FINDINGS:  Right chest wall port.  Lungs are clear. No focal consolidation. No pleural  effusion. No pneumothorax. Normal heart size.     Impression:     No acute findings.        Electronically signed by:Dar-Robert Jose Luis  Date:                                            05/22/2025  Time:                                           17:27              Exam Ended: 05/22/25 17:12 CDT Last Resulted: 05/22/25 17:27 CDT        Assessment/Plan:     Assessment & Plan  Acute saddle pulmonary embolism without acute cor pulmonale  -CTA chest completed  -consult placed to Dr. Khoobehi with vascular surgery  -NPO after midnight and started on therapeutic Lovenox  -supplemental oxygen PRN  -continuous cardiac monitoring  -admitted to ICU on bed rest    Essential hypertension  Patient's blood pressure range in the last 24 hours was: BP  Min: 93/58  Max: 114/64.The patient's inpatient anti-hypertensive regimen is listed below:  Current Antihypertensives  nitroGLYCERIN SL tablet 0.4 mg, Every 5 min PRN, Sublingual    Plan  - BP is controlled, no changes needed to their regimen    Hyperlipidemia  -Patient is chronically on statin.will not continue for now. Monitor clinically.   -home medications at this time due to NPO status for possible surgery in a.m.      Severe obesity (BMI 35.0-39.9) with comorbidity  Body mass index is 32.96 kg/m². Morbid obesity complicates all aspects of disease management from diagnostic modalities to treatment. Weight loss encouraged and health benefits explained to patient.       Acute deep vein thrombosis (DVT) of both lower extremities  -bilateral lower extremity ultrasound noted left tibial vein thrombus in right popliteal vein thrombus  -started on therapeutic Lovenox  -vascular surgery consulted  -continuous cardiac monitoring  -activity bed rest  -neurovascular checks    Elevated troponin  -initial troponin 55.9, repeat troponin 70.2 continue to trend possibly due to demand  -ECG with sinus tachycardia and T-wave abnormality  -remains free of chest pain  -sublingual nitroglycerin  PRN  -ECG p.r.n. chest pain  -continuous cardiac monitoring  -supplemental oxygen PRN  -no aspirin at this time due to possible surgery in a.m.  -on therapeutic Lovenox    Acute cystitis without hematuria  -urinalysis positive nitrites and elevated white count  -urine culture pending  -started on Rocephin  -monitor I's and O's      VTE Risk Mitigation (From admission, onward)           Ordered     enoxaparin injection 90 mg  Every 12 hours         05/23/25 0032     IP VTE HIGH RISK PATIENT  Once         05/23/25 0032                                    Paige Casper, ALLAN  Department of Hospital Medicine  [facility] - Emergency Dept

## 2025-05-23 NOTE — PT/OT/SLP PROGRESS
Occupational Therapy      Patient Name:  Essence Herrera   MRN:  [id number]    Patient not seen today secondary to Bedrest. Will follow-up  next available treatment date..    5/23/2025

## 2025-05-23 NOTE — CONSULTS
[facility]  Vascular Surgery  Consult Note    Inpatient consult to Vascular Surgery  Consult performed by: Khoobehi, Ali, MD  Consult ordered by: Paige Casper DNP        Subjective:     Chief Complaint/Reason for Admission: SOB    History of Present Illness:  Patient with history of breast cancer is admitted complaining of 3 weeks of shortness of breath, recently worsened.  She was found to have extensive saddle pulmonary embolus and bilateral lower extremity DVT.  She reports extreme shortness of breath with any activity.  She has no leg symptoms.  She has no prior history of DVT.    She is status post neoadjuvant chemotherapy who was planned for surgery for her breast cancer next month by Dr. Sanchez,    Prescriptions Prior to Admission[1]    Review of patient's allergies indicates:  No Known Allergies    Past Medical History:   Diagnosis Date    Hyperlipidemia     Hypertension      Past Surgical History:   Procedure Laterality Date    BUNIONECTOMY Bilateral 1996    CARPAL TUNNEL RELEASE Bilateral 1998    DILATION AND CURETTAGE OF UTERUS      HYSTERECTOMY      TVH with conservation of the ovaries    INSERTION OF TUNNELED CENTRAL VENOUS CATHETER (CVC) WITH SUBCUTANEOUS PORT Right 12/16/2024    Procedure: [id number];  Surgeon: Juaquin Sanchez Jr., MD;  Location: [facility];  Service: General;  Laterality: Right;    ROTATOR CUFF REPAIR Right 2005    TUBAL LIGATION       Family History       Problem Relation (Age of Onset)    Breast cancer Maternal Grandfather, Maternal Aunt          Tobacco Use    Smoking status: Never    Smokeless tobacco: Never   Vaping Use    Vaping status: Never Used   Substance and Sexual Activity    Alcohol use: Not Currently    Drug use: Never    Sexual activity: Not Currently     Review of Systems  Objective:     Vital Signs (Most Recent):  Temp: 98.3 °F (36.8 °C) (05/23/25 0705)  Pulse: 82 (05/23/25 1300)  Resp: (!) 30 (05/23/25 1300)  BP: 100/60  "(25 1300)  SpO2: 96 % (25 1300) Vital Signs (24h Range):  Temp:  [98.1 °F (36.7 °C)-99 °F (37.2 °C)] 98.3 °F (36.8 °C)  Pulse:  [] 82  Resp:  [20-42] 30  SpO2:  [91 %-98 %] 96 %  BP: ()/(52-65) 100/60     Weight: 86.4 kg (190 lb 7.6 oz)  Body mass index is 31.7 kg/m².    Date 25 0700 - 25 0659   Shift 4163-8601 8037-8012 1915-1451 24 Hour Total   INTAKE   P.O. 0   0   Shift Total(mL/kg) 0(0)   0(0)   OUTPUT   Shift Total(mL/kg)       Weight (kg) 86.4 86.4 86.4 86.4       Physical Exam  Vitals and nursing note reviewed.   Constitutional:       Appearance: Normal appearance. She is diaphoretic.   Neck:      Vascular: No carotid bruit.   Cardiovascular:      Rate and Rhythm: Normal rate and regular rhythm.      Pulses:           Radial pulses are 2+ on the right side and 2+ on the left side.        Femoral pulses are 2+ on the right side and 2+ on the left side.     Heart sounds: Normal heart sounds.   Pulmonary:      Effort: Pulmonary effort is normal.      Breath sounds: Normal breath sounds.   Abdominal:      Palpations: Abdomen is soft.      Tenderness: There is no abdominal tenderness.   Musculoskeletal:      Right lower le+ Pitting Edema present.      Left lower le+ Pitting Edema present.   Skin:     Capillary Refill: Capillary refill takes less than 2 seconds.   Neurological:      Mental Status: She is alert.         Significant Labs:  CBC:   Recent Labs   Lab 25  0916   WBC 3.69*   RBC 3.29*   HGB 10.2*   HCT 31.4*      MCV 95   MCH 31.0   MCHC 32.5     CMP:   Recent Labs   Lab 25  1716 25  0916    93   CALCIUM 9.2 9.1   ALBUMIN 3.4*  --    PROT 6.5  --    * 134*   K 3.4* 3.7   CO2 24 26   CL 97 99   BUN 25* 22   CREATININE 0.8 0.7   ALKPHOS 78  --    ALT 17  --    AST 21  --    BILITOT 0.7  --      Coagulation: No results for input(s): "LABPROT", "INR", "APTT" in the last 48 hours.    Significant Diagnostics:  I have reviewed all " pertinent imaging results/findings within the past 24 hours.    Assessment/Plan:   Patient has extensive bilateral pulmonary saddle embolus, and bilateral lower extremity DVT.  While there is no heart strain noted on CT scan, radiographically the PE is quite extensive and I believe she may have a better clinical outcome with thrombectomy or catheter directed thrombolysis.  I feel there is some risk of residual pulmonary embolism with anticoagulation alone, though that is an option as well.      I had an extensive discussion with the patient and her family, and they do wish to proceed with treatment for her pulmonary embolus.      Active Diagnoses:    Diagnosis Date Noted POA    PRINCIPAL PROBLEM:  Acute saddle pulmonary embolism without acute cor pulmonale [I26.92] 05/23/2025 Yes    Acute deep vein thrombosis (DVT) of both lower extremities [I82.403] 05/23/2025 Yes    Elevated troponin [R79.89] 05/23/2025 Yes    Acute cystitis without hematuria [N30.00] 05/23/2025 Yes    Severe obesity (BMI 35.0-39.9) with comorbidity [E66.01] 12/09/2024 Yes    Essential hypertension [I10] 02/18/2021 Yes    Hyperlipidemia [E78.5] 02/18/2021 Yes      Problems Resolved During this Admission:       Thank you for your consult. I will follow-up with patient. Please contact us if you have any additional questions.    Ali Khoobehi, MD  Vascular Surgery  [facility]         [1]   Medications Prior to Admission   Medication Sig Dispense Refill Last Dose/Taking    ondansetron (ZOFRAN-ODT) 8 MG TbDL Take 1 tablet (8 mg total) by mouth every 8 (eight) hours as needed (Nausea). 60 tablet 5 Past Month    prochlorperazine (COMPAZINE) 10 MG tablet Take 1 tablet (10 mg total) by mouth every 6 (six) hours as needed. (Patient taking differently: Take 10 mg by mouth every 6 (six) hours as needed (Nausea).) 30 tablet 1 Past Month    traZODone (DESYREL) 50 MG tablet Take 1 tablet (50 mg total) by mouth every evening. 30 tablet 11 Past Week     atorvastatin (LIPITOR) 10 MG tablet Take 10 mg by mouth once daily. (Patient not taking: Reported on 5/22/2025)   Not Taking    dexAMETHasone (DECADRON) 4 MG Tab Take 2 tablets (8 mg total) by mouth As instructed. Take 8 mg by mouth daily on days 2, 3 and 4 of chemotherapy cycle. (Patient not taking: Reported on 5/22/2025) 24 tablet 3 Not Taking    diphenhydrAMINE-aluminum-magnesium hydroxide-simethicone-LIDOcaine viscous HCl 2% Swish and spit 15 mLs every 4 (four) hours as needed. (Patient not taking: Reported on 5/22/2025) 1 each 2 Not Taking    gabapentin (NEURONTIN) 100 MG capsule Take 1 capsule (100 mg total) by mouth 3 (three) times daily. (Patient not taking: Reported on 5/22/2025) 90 capsule 2 Not Taking    LIDOcaine-prilocaine (EMLA) cream Apply topically as needed (Apply to port site 30 minutes before access as needed). 30 g 0     magic mouthwash diphen/antac/lidoc Swish and spit 15 mLs every 4 (four) hours as needed. (Patient not taking: Reported on 5/22/2025) 450 mL 2 Not Taking    MOUNJARO 5 mg/0.5 mL PnIj Inject 5 mg into the skin every 7 days. (Patient not taking: Reported on 5/22/2025)   Not Taking    OLANZapine (ZYPREXA) 5 MG tablet Take 1 tablet (5 mg total) by mouth As instructed. Take 1 tablet by mouth nightly on days 1-4 of each chemotherapy cycle. (Patient not taking: Reported on 5/22/2025) 30 tablet 0 Not Taking    olmesartan-hydrochlorothiazide (BENICAR HCT) 20-12.5 mg per tablet Take 1 tablet by mouth once daily. (Patient not taking: Reported on 5/22/2025)   Not Taking    traMADoL (ULTRAM) 50 mg tablet Take 1 tablet (50 mg total) by mouth every 6 (six) hours as needed for Pain. (Patient not taking: Reported on 5/22/2025) 30 tablet 0 Not Taking

## 2025-05-23 NOTE — RESPIRATORY THERAPY
05/23/25 0330   Patient Assessment/Suction   Level of Consciousness (AVPU) alert   Respiratory Effort Normal;Unlabored   Expansion/Accessory Muscles/Retractions no retractions;no use of accessory muscles   All Lung Fields Breath Sounds clear;diminished   Skin Integrity   $ Wound Care Tech Time 15 min   Area Observed Left;Right;Behind ear;Cheek;Nares   Skin Appearance without discoloration   PRE-TX-O2   Device (Oxygen Therapy) nasal cannula   Flow (L/min) (Oxygen Therapy) 3   SpO2 96 %   Pulse Oximetry Type Continuous   $ Pulse Oximetry - Multiple Charge Pulse Oximetry - Multiple   Pulse 88   Resp (!) 24   Aerosol Therapy   $ Aerosol Therapy Charges PRN treatment not required   Education   $ Education Bronchodilator;Oxygen;15 min   Respiratory Evaluation   $ Care Plan Tech Time 15 min

## 2025-05-23 NOTE — SUBJECTIVE & OBJECTIVE
Past Medical History:   Diagnosis Date    Hyperlipidemia     Hypertension        Past Surgical History:   Procedure Laterality Date    BUNIONECTOMY Bilateral 1996    CARPAL TUNNEL RELEASE Bilateral 1998    DILATION AND CURETTAGE OF UTERUS      HYSTERECTOMY      TVH with conservation of the ovaries    INSERTION OF TUNNELED CENTRAL VENOUS CATHETER (CVC) WITH SUBCUTANEOUS PORT Right 12/16/2024    Procedure: CDEXQUXTV-WUUC-T-CATH;  Surgeon: Juaquin Sanchez Jr., MD;  Location: [facility];  Service: General;  Laterality: Right;    ROTATOR CUFF REPAIR Right 2005    TUBAL LIGATION         Review of patient's allergies indicates:  No Known Allergies    No current facility-administered medications on file prior to encounter.     Current Outpatient Medications on File Prior to Encounter   Medication Sig    ondansetron (ZOFRAN-ODT) 8 MG TbDL Take 1 tablet (8 mg total) by mouth every 8 (eight) hours as needed (Nausea).    prochlorperazine (COMPAZINE) 10 MG tablet Take 1 tablet (10 mg total) by mouth every 6 (six) hours as needed. (Patient taking differently: Take 10 mg by mouth every 6 (six) hours as needed (Nausea).)    traZODone (DESYREL) 50 MG tablet Take 1 tablet (50 mg total) by mouth every evening.    atorvastatin (LIPITOR) 10 MG tablet Take 10 mg by mouth once daily. (Patient not taking: Reported on 5/22/2025)    dexAMETHasone (DECADRON) 4 MG Tab Take 2 tablets (8 mg total) by mouth As instructed. Take 8 mg by mouth daily on days 2, 3 and 4 of chemotherapy cycle. (Patient not taking: Reported on 5/22/2025)    diphenhydrAMINE-aluminum-magnesium hydroxide-simethicone-LIDOcaine viscous HCl 2% Swish and spit 15 mLs every 4 (four) hours as needed. (Patient not taking: Reported on 5/22/2025)    gabapentin (NEURONTIN) 100 MG capsule Take 1 capsule (100 mg total) by mouth 3 (three) times daily. (Patient not taking: Reported on 5/22/2025)    LIDOcaine-prilocaine (EMLA) cream Apply topically as needed (Apply to port site 30  minutes before access as needed).    magic mouthwash diphen/antac/lidoc Swish and spit 15 mLs every 4 (four) hours as needed. (Patient not taking: Reported on 5/22/2025)    MOUNJARO 5 mg/0.5 mL PnIj Inject 5 mg into the skin every 7 days. (Patient not taking: Reported on 5/22/2025)    OLANZapine (ZYPREXA) 5 MG tablet Take 1 tablet (5 mg total) by mouth As instructed. Take 1 tablet by mouth nightly on days 1-4 of each chemotherapy cycle. (Patient not taking: Reported on 5/22/2025)    olmesartan-hydrochlorothiazide (BENICAR HCT) 20-12.5 mg per tablet Take 1 tablet by mouth once daily. (Patient not taking: Reported on 5/22/2025)    traMADoL (ULTRAM) 50 mg tablet Take 1 tablet (50 mg total) by mouth every 6 (six) hours as needed for Pain. (Patient not taking: Reported on 5/22/2025)     Family History       Problem Relation (Age of Onset)    Breast cancer Maternal Grandfather, Maternal Aunt          Tobacco Use    Smoking status: Never    Smokeless tobacco: Never   Substance and Sexual Activity    Alcohol use: Not Currently    Drug use: Never    Sexual activity: Not Currently     Review of Systems   Constitutional:  Positive for activity change. Negative for appetite change, chills, diaphoresis, fatigue and fever.   HENT:  Negative for congestion.    Respiratory:  Positive for shortness of breath. Negative for cough and wheezing.    Cardiovascular:  Positive for leg swelling. Negative for chest pain.   Gastrointestinal:  Negative for abdominal distention, abdominal pain, nausea and vomiting.   Genitourinary:  Negative for difficulty urinating.   Musculoskeletal:  Positive for arthralgias. Negative for back pain.   Neurological:  Negative for dizziness, seizures, syncope, facial asymmetry, speech difficulty, light-headedness, numbness and headaches.     Objective:     Vital Signs (Most Recent):  Temp: 99 °F (37.2 °C) (05/22/25 2330)  Pulse: 97 (05/23/25 0050)  Resp: (!) 30 (05/23/25 0050)  BP: 103/65 (05/22/25  2330)  SpO2: (!) 92 % (25 0050) Vital Signs (24h Range):  Temp:  [98.1 °F (36.7 °C)-99 °F (37.2 °C)] 99 °F (37.2 °C)  Pulse:  [] 97  Resp:  [20-30] 30  SpO2:  [92 %-98 %] 92 %  BP: ()/(58-65) 103/65     Weight: 87.1 kg (192 lb)  Body mass index is 32.96 kg/m².     Physical Exam  Vitals and nursing note reviewed.   Constitutional:       Appearance: She is ill-appearing.   Eyes:      Pupils: Pupils are equal, round, and reactive to light.   Cardiovascular:      Rate and Rhythm: Regular rhythm. Tachycardia present.      Pulses: Normal pulses.           Dorsalis pedis pulses are 2+ on the right side and 2+ on the left side.        Posterior tibial pulses are 2+ on the right side and 2+ on the left side.      Heart sounds: Normal heart sounds.   Pulmonary:      Effort: Pulmonary effort is normal. No respiratory distress.      Breath sounds: Examination of the right-lower field reveals decreased breath sounds. Examination of the left-lower field reveals decreased breath sounds. Decreased breath sounds present. No wheezing.   Chest:      Comments: Right chest wall Port-A-Cath  Abdominal:      General: Bowel sounds are normal. There is no distension.      Palpations: Abdomen is soft.      Tenderness: There is no abdominal tenderness. There is no guarding.   Musculoskeletal:      Right lower leg: No edema.      Left lower le+ Edema present.   Skin:     General: Skin is warm and dry.      Capillary Refill: Capillary refill takes less than 2 seconds.   Neurological:      Mental Status: She is oriented to person, place, and time.      GCS: GCS eye subscore is 4. GCS verbal subscore is 5. GCS motor subscore is 6.              CRANIAL NERVES     CN III, IV, VI   Pupils are equal, round, and reactive to light.       Significant Labs: All pertinent labs within the past 24 hours have been reviewed.    Bilirubin:   Recent Labs   Lab 25  0854 25  1716   BILITOT 0.5 0.7       BMP:   Recent Labs   Lab  05/22/25  1716      *   K 3.4*   CL 97   CO2 24   BUN 25*   CREATININE 0.8   CALCIUM 9.2     CBC:   Recent Labs   Lab 05/22/25  1716   WBC 3.54*   HGB 10.8*   HCT 32.7*        CMP:   Recent Labs   Lab 05/22/25  1716   *   K 3.4*   CL 97   CO2 24      BUN 25*   CREATININE 0.8   CALCIUM 9.2   PROT 6.5   ALBUMIN 3.4*   BILITOT 0.7   ALKPHOS 78   AST 21   ALT 17   ANIONGAP 11     Cardiac Markers:   Recent Labs   Lab 05/22/25  1716   *       Urine Studies:   Recent Labs   Lab 05/22/25  2333   APPEARANCEUA Hazy*   SPECGRAV >=1.030*   PROTEINUA 1+*   BILIRUBINUA Negative   UROBILINOGEN Negative   LEUKOCYTESUR 3+*   RBCUA 7*   WBCUA >100*   BACTERIA Moderate*       Significant Imaging: I have reviewed all pertinent imaging results/findings within the past 24 hours.    CTA Chest Non-Coronary (PE Studies)  Order: [id number]   Status: Final result       Next appt: 06/06/2025 at 11:00 AM in Pre-Admission Testing (PRE-ADMIT, [facility])    Test Result Released: Yes (not seen)    0 Result Notes  Details    Reading Physician Reading Date Result Priority   Harika Amaya MD  [phone]  5/22/2025 STAT     Narrative & Impression  EXAMINATION:  CTA CHEST NON CORONARY (PE STUDIES)     CLINICAL HISTORY:  Pulmonary embolism (PE) suspected, high prob;     TECHNIQUE:  Low dose axial images, sagittal and coronal reformations were obtained from the thoracic inlet to the lung bases following the IV administration of 100 mL of Omnipaque 350.  Contrast timing was optimized to evaluate the pulmonary arteries.  MIP images were performed.     COMPARISON:  04/01/2025     FINDINGS:  Extensive bilateral multifocal pulmonary thromboembolic disease with saddle pulmonary embolus.  No evidence of right heart strain.  Airspace disease peripheral aspect right upper, middle, and lower lobe, likely pulmonary infarct.  Follow-up to resolution.     No cardiomegaly or pericardial effusion.  Moderate coronary artery  atherosclerosis.  Normal caliber aorta.     Shotty mediastinal lymphadenopathy, nonspecific.     T8 vertebral body hemangioma.  Endplate sclerosis T10 and T11 with new vacuum phenomenon T10-11.  Chronic compression fracture deformity L1..     Possible cholelithiasis.  Nonspecific hypodense lesion right hepatic lobe.     Impression:     Extensive bilateral multifocal pulmonary thromboembolic disease with saddle pulmonary embolus.  No evidence of right heart strain.  Airspace disease peripheral aspect right upper, middle, and lower lobe, likely pulmonary infarct. Follow-up to resolution.  Notified ordering physician of critical findings at time of dictation.     Other findings above        Electronically signed by:Harika Amaya  Date:                                            05/22/2025  Time:                                           19:56        Exam Ended: 05/22/25 18:12 CDT Last Resulted: 05/22/25 19:56 CDT     Contains abnormal data US Lower Extremity Veins Left  Order: [id number]   Status: Final result       Next appt: 06/06/2025 at 11:00 AM in Pre-Admission Testing (PRE-ADMIT, [facility])       Dx: Leg swelling    Test Result Released: Yes (not seen)    0 Result Notes  Details    Reading Physician Reading Date Result Priority   Harika Amaya MD  [phone]  5/22/2025 STAT     Narrative & Impression  EXAMINATION:  US LOWER EXTREMITY VEINS LEFT     CLINICAL HISTORY:  Other specified soft tissue disorders     TECHNIQUE:  Duplex and color flow Doppler evaluation and graded compression of the left lower extremity veins was performed.     COMPARISON:  None     FINDINGS:  Left thigh veins: The common femoral, femoral, popliteal, deep femoral veins are thrombosed.  Greater saphenous vein is patent.     Left calf veins: Left posterior tibial vein is thrombosed.     Contralateral right leg: Right popliteal vein is thrombosed.     Miscellaneous: None     Impression:     Extensive left lower extremity DVT.      Right popliteal vein DVT.     This report was flagged in Epic as abnormal.     Notified ordering physician via [employer] secure chat at time of dictation        Electronically signed by:Harika Amaya  Date:                                            05/22/2025  Time:                                           18:13        Exam Ended: 05/22/25 17:54 CDT Last Resulted: 05/22/25 18:13 CDT     X-Ray Chest PA And Lateral  Order: [id number]   Status: Final result       Next appt: 06/06/2025 at 11:00 AM in Pre-Admission Testing (PRE-ADMIT, [facility])       Dx: SOB (shortness of breath)    Test Result Released: Yes (not seen)    0 Result Notes  Details    Reading Physician Reading Date Result Priority   Harika Amaya MD  [phone]  5/22/2025 STAT     Narrative & Impression  EXAMINATION:  XR CHEST PA AND LATERAL     CLINICAL HISTORY:  Shortness of breath     TECHNIQUE:  PA and lateral views of the chest were performed.     COMPARISON:  04/01/2025     FINDINGS:  Right chest wall port.  Lungs are clear. No focal consolidation. No pleural effusion. No pneumothorax. Normal heart size.     Impression:     No acute findings.        Electronically signed by:Harika Amaya  Date:                                            05/22/2025  Time:                                           17:27        Exam Ended: 05/22/25 17:12 CDT Last Resulted: 05/22/25 17:27 CDT

## 2025-05-23 NOTE — ASSESSMENT & PLAN NOTE
Patient's blood pressure range in the last 24 hours was: BP  Min: 93/58  Max: 114/64.The patient's inpatient anti-hypertensive regimen is listed below:  Current Antihypertensives  nitroGLYCERIN SL tablet 0.4 mg, Every 5 min PRN, Sublingual    Plan  - BP is controlled, no changes needed to their regimen     detailed exam

## 2025-05-23 NOTE — PLAN OF CARE
Patient is a 75 year old female with history of breast cancer, recently completed chemotherapy. Patient presented with SOB, found to have bilateral lower extremity DVT and saddle PE. Patient is hemodynamically stable on 3 L oxygen. Denies any chest pain. Patient is on therapeutic lovenox. Echo ordered. Vascular has been consulted for possible thrombectomy.

## 2025-05-23 NOTE — ASSESSMENT & PLAN NOTE
-Patient is chronically on statin.will not continue for now. Monitor clinically.   -home medications at this time due to NPO status for possible surgery in a.m.

## 2025-05-23 NOTE — CARE UPDATE
Wean oxygen as tolerated   05/23/25 0800   Patient Assessment/Suction   Level of Consciousness (AVPU) alert   Respiratory Effort Unlabored   Expansion/Accessory Muscles/Retractions no use of accessory muscles;expansion symmetric   All Lung Fields Breath Sounds clear;equal bilaterally   Rhythm/Pattern, Respiratory unlabored;depth regular;no shortness of breath reported   PRE-TX-O2   Device (Oxygen Therapy) nasal cannula   Flow (L/min) (Oxygen Therapy) 3   Oxygen Concentration (%) 32   Pulse Oximetry Type Continuous   $ Pulse Oximetry - Multiple Charge Pulse Oximetry - Multiple   Aerosol Therapy   $ Aerosol Therapy Charges PRN treatment not required   Ready to Wean/Extubation Screen   FIO2<=50 (chart decimal) 0.32   Education   $ Education Oxygen;15 min   Respiratory Evaluation   $ Care Plan Tech Time 15 min

## 2025-05-23 NOTE — OP NOTE
[facility]  Vascular Surgery  Operative Note    SUMMARY     Date of Procedure: 5/23/2025     Procedure:   Right and left pulmonary artery angiography, right and left pulmonary artery mechanical thrombectomy    Surgeons and Role:     * Khoobehi, Ali, MD - Primary    Assisting Surgeon: None    Pre-Operative Diagnosis: SOB (shortness of breath) [R06.02]    Post-Operative Diagnosis: Post-Op Diagnosis Codes:     * SOB (shortness of breath) [R06.02]    Anesthesia: RN IV Sedation    Operative Findings (including complications, if any):  Bilateral PE    Description of Technical Procedures:   Indications, risks, and benefits of  venogram, pulmonary artery thrombectomy, possible lysis were discussed with the patient. Risks discussed included possible bleeding, infection, cardiac arrest, limb loss, renal failure, nerve injury, stroke, and death, among other risks. Patient was agreeable for the procedure and signed consent.    Patient was brought to the procedure room and placed under sedation. Under my direct supervision, moderate sedation was administered with an initial dose of Versed (1 mg) and Fentanyl (100 mcgs). These were readministered during the course of the case. An independent observer was present throughout the procedure, there was continuous monitoring of cardiac telemetry, hemodynamics and pulse oximetry. I was personally present and spent 30 minutes face to face time during sedation administration.    Using ultrasound guidance access was obtained at the right  common femoral vein. 6F sheath was placed.     I was able to enter the PA with a pigtail catheter and a Grant wire.    Pulmonary angiogram showed poor filling in the periphery.    Access was upsized to a 24F which was was advanced to the right PA and Flowtriever thrombectomy was performed with evacuation of acute and subacute appearing thrombus.    The left pulmonary artery was then selected and thrombectomy was performed with the 20F  curved Flowtriever catheter.    Completion angiogram shows resolution of the PE. Pt's O2 sat has improved to 99%    Sheath was removed and pressure held.  Patient left the procedure room in stable condition.    Significant Surgical Tasks Conducted by the Assistant(s), if Applicable: n/a    Estimated Blood Loss (EBL): * No values recorded between 5/23/2025 12:00 AM and 5/23/2025  6:55 PM *           Implants: * No implants in log *    Specimens:   Specimen (24h ago, onward)      None                    Condition: Good    Disposition: PACU - hemodynamically stable.    Attestation: I performed the procedure.

## 2025-05-23 NOTE — ASSESSMENT & PLAN NOTE
-bilateral lower extremity ultrasound noted left tibial vein thrombus in right popliteal vein thrombus  -started on therapeutic Lovenox  -vascular surgery consulted  -continuous cardiac monitoring  -activity bed rest  -neurovascular checks

## 2025-05-23 NOTE — ASSESSMENT & PLAN NOTE
-urinalysis positive nitrites and elevated white count  -urine culture pending  -started on Rocephin  -monitor I's and O's

## 2025-05-23 NOTE — PLAN OF CARE
[facility]  Initial Discharge Assessment    CM met with pt who had daughter and daughter in law present to discuss discharge planning. Pt is generally independent in functioning, own CG, drives. She lives in a 2nd floor apartment with no elevator and 16 steps. Daughter states that pt is on the waiting list for bottom floor apartment and plans to initially discharge home with daughter to [address] [place] [place] [address]. No HH/dialysis/HH/POA/AD. Pt may benefit from HH at discharge. Will continue to follow.     Primary Care Provider: Jose Manuel Chung MD    Admission Diagnosis: Acute saddle pulmonary embolism without acute cor pulmonale [I26.92]    Admission Date: 5/22/2025  Expected Discharge Date: 5/28/2025    Transition of Care Barriers: None    Payor: [employer] [facility] / Plan: [employer] / Product Type: Medicare Advantage /     Extended Emergency Contact Information  Primary Emergency Contact: MEENA AQUINO  Mobile Phone: [phone]  Relation: Daughter  Preferred language: English   needed? No  Secondary Emergency Contact: FRYSON,DESHONE  Mobile Phone: [phone]  Relation: Son  Preferred language: English    Discharge Plan A: Home with family  Discharge Plan B: Home Health      [facility] [address]  [address] [facility] [address]  Phone: [phone] Fax: [phone]      Initial Assessment (most recent)       Adult Discharge Assessment - 05/23/25 1211          Discharge Assessment    Assessment Type Discharge Planning Assessment     Confirmed/corrected address, phone number and insurance Yes     Confirmed Demographics Correct on Facesheet     Source of Information patient     If unable to respond/provide information was family/caregiver contacted? --   Daughter Meena and daughter in law present    Communicated STEWART with patient/caregiver Date not available/Unable to determine     People in Home alone      Do you expect to return to your current living situation? Other (see comments)   Plans to stay with daugter at discharge    Do you have help at home or someone to help you manage your care at home? No     Current cognitive status: Alert/Oriented     Walking or Climbing Stairs Difficulty no     Dressing/Bathing Difficulty no     Home Accessibility not wheelchair accessible     Home Layout Other (see comments)   Lives in 2nd floor apartment with 16 steps and no elevator    Equipment Currently Used at Home cane, straight;rollator     Readmission within 30 days? No     Patient currently being followed by outpatient case management? No     Do you currently have service(s) that help you manage your care at home? No     Do you take prescription medications? Yes     Do you have prescription coverage? Yes     Do you have any problems affording any of your prescribed medications? No     Is the patient taking medications as prescribed? yes     Who is going to help you get home at discharge? MEENA AQUINO (Daughter)  [phone]     How do you get to doctors appointments? car, drives self;family or friend will provide     Are you on dialysis? No     Do you take coumadin? No     Discharge Plan A Home with family     Discharge Plan B Home Health     DME Needed Upon Discharge  none     Discharge Plan discussed with: Patient;Adult children     Transition of Care Barriers None

## 2025-05-23 NOTE — ASSESSMENT & PLAN NOTE
Body mass index is 32.96 kg/m². Morbid obesity complicates all aspects of disease management from diagnostic modalities to treatment. Weight loss encouraged and health benefits explained to patient.

## 2025-05-23 NOTE — ASSESSMENT & PLAN NOTE
-CTA chest completed  -consult placed to Dr. Khoobehi with vascular surgery  -NPO after midnight and started on therapeutic Lovenox  -supplemental oxygen PRN  -continuous cardiac monitoring  -admitted to ICU on bed rest

## 2025-05-23 NOTE — PT/OT/SLP PROGRESS
Physical Therapy      Patient Name:  Essence Herrera   MRN:  [id number]    Patient not seen today secondary to patient on bedrest awaiting vascular surgery consult. Will follow-up 05/24/25.

## 2025-05-23 NOTE — HPI
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 She reports left lower extremity swelling and worsening shortness of breath with exertion for the past 3 days. She states she was told by her PCP to report to the ED for increasing shortness of breath and tachycardia. She is preparing for a lumpectomy in June.  She denies any chest pain, fever, chills, dizziness, headaches, lightheadedness, numbness/tingling, bowel/urinary complications, abdominal pain, nausea, or vomiting.  ED workup includes:  CTA chest with extensive bilateral multifocal pulmonary thromboembolic disease with saddle pulmonary embolus without any evidence of right heart strain, airspace disease peripheral aspect right upper, middle, and lower lobe, likely pulmonary infarct. Bilateral lower extremity ultrasound with extensive left lower extremity DVT and right popliteal vein DVT.  Chest x-ray no acute findings. CBC white count 3.5 and H&H 10.8/32.7.  CMP with sodium 132, potassium 3.4, BUN 25, and albumin 3.5 otherwise unremarkable.  Elevated troponin initial 55.9, repeat 70.2, continue to trend.  ECG with sinus tachycardia 112 with some T-wave abnormality.  Echocardiogram pending.  Urinalysis positive nitrites and elevated WBCs > 100.  Started on Rocephin and urine cultures pending.  Blood cultures pending, lactic acid 1.8.  ED MD notified Dr. Khoobehi, with vascular surgery, started on therapeutic Lovenox and placed NPO. Admitted to hospital medicine for further management and treatment.

## 2025-05-23 NOTE — ASSESSMENT & PLAN NOTE
-chronic condition noted  -right chest wall Port-A-Cath  -completed last round of Taxol 3 weeks ago  -lumpectomy next month in June

## 2025-05-24 LAB
ERYTHROCYTE [DISTWIDTH] IN BLOOD BY AUTOMATED COUNT: 14.7 % (ref 11.5–14.5)
HCT VFR BLD AUTO: 28.1 % (ref 37–48.5)
HGB BLD-MCNC: 9.4 GM/DL (ref 12–16)
MAGNESIUM SERPL-MCNC: 2.1 MG/DL (ref 1.6–2.6)
MCH RBC QN AUTO: 30.9 PG (ref 27–31)
MCHC RBC AUTO-ENTMCNC: 33.5 G/DL (ref 32–36)
MCV RBC AUTO: 92 FL (ref 82–98)
NUCLEATED RBC (/100WBC) (SMH): 0 /100 WBC
PLATELET # BLD AUTO: 186 K/UL (ref 150–450)
PMV BLD AUTO: 10.9 FL (ref 9.2–12.9)
RBC # BLD AUTO: 3.04 M/UL (ref 4–5.4)
WBC # BLD AUTO: 4.22 K/UL (ref 3.9–12.7)

## 2025-05-24 PROCEDURE — 99900031 HC PATIENT EDUCATION (STAT)

## 2025-05-24 PROCEDURE — 25000003 PHARM REV CODE 250: Performed by: INTERNAL MEDICINE

## 2025-05-24 PROCEDURE — 97161 PT EVAL LOW COMPLEX 20 MIN: CPT

## 2025-05-24 PROCEDURE — 36415 COLL VENOUS BLD VENIPUNCTURE: CPT

## 2025-05-24 PROCEDURE — 94761 N-INVAS EAR/PLS OXIMETRY MLT: CPT

## 2025-05-24 PROCEDURE — 97530 THERAPEUTIC ACTIVITIES: CPT

## 2025-05-24 PROCEDURE — 20600001 HC STEP DOWN PRIVATE ROOM

## 2025-05-24 PROCEDURE — 27000221 HC OXYGEN, UP TO 24 HOURS

## 2025-05-24 PROCEDURE — 99900035 HC TECH TIME PER 15 MIN (STAT)

## 2025-05-24 PROCEDURE — 63600175 PHARM REV CODE 636 W HCPCS

## 2025-05-24 PROCEDURE — 83735 ASSAY OF MAGNESIUM: CPT | Performed by: INTERNAL MEDICINE

## 2025-05-24 PROCEDURE — 85025 COMPLETE CBC W/AUTO DIFF WBC: CPT

## 2025-05-24 PROCEDURE — 97165 OT EVAL LOW COMPLEX 30 MIN: CPT

## 2025-05-24 PROCEDURE — 25000003 PHARM REV CODE 250

## 2025-05-24 RX ORDER — ATORVASTATIN CALCIUM 10 MG/1
10 TABLET, FILM COATED ORAL DAILY
Status: DISCONTINUED | OUTPATIENT
Start: 2025-05-24 | End: 2025-05-26 | Stop reason: HOSPADM

## 2025-05-24 RX ORDER — CEFTRIAXONE 1 G/1
1 INJECTION, POWDER, FOR SOLUTION INTRAMUSCULAR; INTRAVENOUS
Status: DISCONTINUED | OUTPATIENT
Start: 2025-05-25 | End: 2025-05-25

## 2025-05-24 RX ADMIN — MUPIROCIN 1 G: 20 OINTMENT TOPICAL at 08:05

## 2025-05-24 RX ADMIN — CEFTRIAXONE SODIUM 2 G: 2 INJECTION, POWDER, FOR SOLUTION INTRAMUSCULAR; INTRAVENOUS at 01:05

## 2025-05-24 RX ADMIN — ENOXAPARIN SODIUM 90 MG: 100 INJECTION SUBCUTANEOUS at 09:05

## 2025-05-24 RX ADMIN — ENOXAPARIN SODIUM 90 MG: 100 INJECTION SUBCUTANEOUS at 08:05

## 2025-05-24 RX ADMIN — ATORVASTATIN CALCIUM 10 MG: 10 TABLET, FILM COATED ORAL at 12:05

## 2025-05-24 RX ADMIN — ACETAMINOPHEN 1000 MG: 500 TABLET, FILM COATED ORAL at 07:05

## 2025-05-24 RX ADMIN — MUPIROCIN 1 G: 20 OINTMENT TOPICAL at 09:05

## 2025-05-24 NOTE — CARE UPDATE
Wean oxygen as tolerated   05/24/25 5820   Patient Assessment/Suction   Level of Consciousness (AVPU) alert   Respiratory Effort Normal;Unlabored   Expansion/Accessory Muscles/Retractions no use of accessory muscles;expansion symmetric   All Lung Fields Breath Sounds clear;diminished   TEZ Breath Sounds clear   LLL Breath Sounds diminished   RUL Breath Sounds clear   RML Breath Sounds clear   RLL Breath Sounds diminished   Rhythm/Pattern, Respiratory unlabored;depth regular   Cough Frequency no cough   PRE-TX-O2   Device (Oxygen Therapy) nasal cannula   $ Is the patient on Low Flow Oxygen? Yes   Flow (L/min) (Oxygen Therapy) 3   SpO2 95 %   Pulse Oximetry Type Continuous   $ Pulse Oximetry - Multiple Charge Pulse Oximetry - Multiple   Aerosol Therapy   $ Aerosol Therapy Charges PRN treatment not required   Daily Review of Necessity (SVN) completed   Education   $ Education Oxygen;15 min   Respiratory Evaluation   $ Care Plan Tech Time 15 min

## 2025-05-24 NOTE — SUBJECTIVE & OBJECTIVE
Interval History: resting comfortably in bed. Breathing better.    Oncology Treatment Plan:   OP BREAST DOSE-DENSE AC-T (DOXORUBICIN CYCLOPHOSPHAMIDE Q2W FOLLOWED BY PACLITAXEL Q2W)    Medications:  Continuous Infusions:   0.9% NaCl    Continuous  mL/hr at 05/23/25 1850 250 mL at 05/23/25 1850     Scheduled Meds:   atorvastatin  10 mg Oral Daily    [START ON 5/25/2025] cefTRIAXone (Rocephin) IV (PEDS and ADULTS)  1 g Intravenous Q24H    enoxparin  1 mg/kg Subcutaneous Q12H (prophylaxis, 0900/2100)    mupirocin   Nasal BID     PRN Meds:  Current Facility-Administered Medications:     0.9% NaCl, , , Continuous PRN    acetaminophen, 1,000 mg, Oral, Q6H PRN    albuterol-ipratropium, 3 mL, Nebulization, Q6H PRN    aluminum-magnesium hydroxide-simethicone, 30 mL, Oral, QID PRN    dextrose 50%, 12.5 g, Intravenous, PRN    dextrose 50%, 25 g, Intravenous, PRN    glucagon (human recombinant), 1 mg, Intramuscular, PRN    glucose, 16 g, Oral, PRN    glucose, 24 g, Oral, PRN    HYDROmorphone, 1 mg, Intravenous, Q4H PRN    magnesium oxide, 800 mg, Oral, PRN    magnesium oxide, 800 mg, Oral, PRN    melatonin, 9 mg, Oral, Nightly PRN    naloxone, 0.02 mg, Intravenous, PRN    nitroGLYCERIN, 0.4 mg, Sublingual, Q5 Min PRN    ondansetron, 4 mg, Intravenous, Q6H PRN    potassium bicarbonate, 35 mEq, Oral, PRN    potassium bicarbonate, 50 mEq, Oral, PRN    potassium bicarbonate, 60 mEq, Oral, PRN    prochlorperazine, 5 mg, Intravenous, Q6H PRN    senna-docusate, 1 tablet, Oral, BID PRN    sodium chloride 0.9%, 10 mL, Intravenous, Q12H PRN     Review of Systems   Constitutional:  Negative for activity change, appetite change, fatigue and fever.   HENT:  Negative for mouth sores and postnasal drip.    Eyes:  Negative for visual disturbance.   Respiratory:  Negative for cough, chest tightness and shortness of breath.    Cardiovascular:  Negative for chest pain, palpitations and leg swelling.   Gastrointestinal:  Negative for  abdominal distention, abdominal pain, blood in stool, diarrhea, nausea and vomiting.   Genitourinary:  Negative for difficulty urinating, dysuria, hematuria and urgency.   Musculoskeletal:  Negative for arthralgias, back pain and joint swelling.   Skin:  Negative for rash.   Neurological:  Negative for dizziness, weakness and headaches.   Hematological:  Negative for adenopathy. Does not bruise/bleed easily.   Psychiatric/Behavioral:  Negative for confusion and decreased concentration.      Objective:     Vital Signs (Most Recent):  Temp: 97.9 °F (36.6 °C) (05/24/25 0735)  Pulse: 82 (05/24/25 0900)  Resp: 12 (05/24/25 0900)  BP: (!) 113/59 (05/24/25 0900)  SpO2: 99 % (05/24/25 0900) Vital Signs (24h Range):  Temp:  [97.8 °F (36.6 °C)-98.1 °F (36.7 °C)] 97.9 °F (36.6 °C)  Pulse:  [] 82  Resp:  [8-30] 12  SpO2:  [89 %-100 %] 99 %  BP: ()/(51-69) 113/59     Weight: 86.4 kg (190 lb 7.6 oz)  Body mass index is 31.7 kg/m².  Body surface area is 1.99 meters squared.      Intake/Output Summary (Last 24 hours) at 5/24/2025 1712  Last data filed at 5/24/2025 0701  Gross per 24 hour   Intake 730 ml   Output 375 ml   Net 355 ml        Physical Exam  Vitals reviewed.   Constitutional:       Appearance: Normal appearance.   HENT:      Head: Normocephalic and atraumatic.   Eyes:      General: No scleral icterus.     Pupils: Pupils are equal, round, and reactive to light.   Cardiovascular:      Rate and Rhythm: Normal rate and regular rhythm.      Pulses: Normal pulses.      Heart sounds: Normal heart sounds.   Pulmonary:      Effort: Pulmonary effort is normal.      Breath sounds: Normal breath sounds.   Abdominal:      General: Bowel sounds are normal. There is no distension.   Musculoskeletal:         General: No swelling.      Right lower leg: Edema present.      Left lower leg: Edema present.   Lymphadenopathy:      Cervical: No cervical adenopathy.   Skin:     General: Skin is warm.      Findings: No rash.    Neurological:      General: No focal deficit present.      Mental Status: She is alert.          Significant Labs:   All pertinent labs from the last 24 hours have been reviewed.    Diagnostic Results:  I have reviewed and interpreted all pertinent imaging results/findings within the past 24 hours.

## 2025-05-24 NOTE — HOSPITAL COURSE
"Patient is a 75 year old female with history of breast cancer, had recently completed chemotherapy. Patient presented with SOB, found to have bilateral lower extremity DVT and saddle PE. Patient was hemodynamically stable on 3 L oxygen. Denied any chest pain. Patient was started on weight-based Lovenox. Echo ordered. Vascular was consulted, and patient underwent thrombectomy 5/23. Hematology-oncology also was consulted.  Patient was "stepped down" from ICU to Stepdown unit.  Remained hemodynamically stable.  Remained on Lovenox.  For UTI, she received a total of 5 grams of ceftriaxone over a three-day period.  Urine culture grew E.coli.  Blood pressure soft, but stable.  We were able to wean her off of supplemental oxygen.  We discharged her home with prescription for Xarelto.    Physical Exam  Vitals and nursing note reviewed.   Constitutional:       General: She is not in acute distress.     Appearance: She is not toxic-appearing.    Cardiovascular:      Rate and Rhythm: Normal rate and regular rhythm.      Heart sounds: No murmur heard.  Pulmonary:      Effort: No respiratory distress.      Breath sounds: No wheezing, rhonchi or rales.   "

## 2025-05-24 NOTE — ASSESSMENT & PLAN NOTE
-s/p neoadjuvant chemotherapy for triple negative stage I breast cancer  -scheduled for lumpectomy 6/12/25  -will see patient in clinic prior to this to discuss further and discuss with Dr. Sanchez as well

## 2025-05-24 NOTE — ASSESSMENT & PLAN NOTE
Patient's blood pressure range in the last 24 hours was: BP  Min: 89/51  Max: 116/60.The patient's inpatient anti-hypertensive regimen is listed below:  Current Antihypertensives  nitroGLYCERIN SL tablet 0.4 mg, Every 5 min PRN, Sublingual    Plan  - BP is controlled, no changes needed to their regimen

## 2025-05-24 NOTE — SUBJECTIVE & OBJECTIVE
Interval History: Patient is feeling better. No SOB or chest pain. S/p thrombectomy. Daughter at bedside.     Review of Systems  Objective:     Vital Signs (Most Recent):  Temp: 97.9 °F (36.6 °C) (05/24/25 0735)  Pulse: 82 (05/24/25 0900)  Resp: 12 (05/24/25 0900)  BP: (!) 113/59 (05/24/25 0900)  SpO2: 99 % (05/24/25 0900) Vital Signs (24h Range):  Temp:  [97.8 °F (36.6 °C)-98.1 °F (36.7 °C)] 97.9 °F (36.6 °C)  Pulse:  [] 82  Resp:  [8-30] 12  SpO2:  [89 %-100 %] 99 %  BP: ()/(51-69) 113/59     Weight: 86.4 kg (190 lb 7.6 oz)  Body mass index is 31.7 kg/m².    Intake/Output Summary (Last 24 hours) at 5/24/2025 1123  Last data filed at 5/24/2025 0701  Gross per 24 hour   Intake 730 ml   Output 775 ml   Net -45 ml         Physical Exam  Vitals and nursing note reviewed.   Constitutional:       General: She is not in acute distress.     Appearance: She is not toxic-appearing.   HENT:      Head: Atraumatic.      Mouth/Throat:      Mouth: Mucous membranes are moist.      Pharynx: Oropharynx is clear.   Eyes:      General: No scleral icterus.     Conjunctiva/sclera: Conjunctivae normal.      Pupils: Pupils are equal, round, and reactive to light.   Cardiovascular:      Rate and Rhythm: Normal rate and regular rhythm.      Heart sounds: No murmur heard.  Pulmonary:      Effort: No respiratory distress.      Breath sounds: No wheezing, rhonchi or rales.   Abdominal:      General: Abdomen is flat. Bowel sounds are normal.      Palpations: Abdomen is soft.   Musculoskeletal:         General: No swelling or deformity.      Cervical back: No rigidity or tenderness.   Skin:     Coloration: Skin is not jaundiced or pale.      Findings: No bruising, erythema or rash.   Neurological:      General: No focal deficit present.      Mental Status: She is alert and oriented to person, place, and time.      Cranial Nerves: No cranial nerve deficit.      Sensory: No sensory deficit.      Motor: No weakness.   Psychiatric:          Mood and Affect: Mood normal.         Behavior: Behavior normal.               Significant Labs: All pertinent labs within the past 24 hours have been reviewed.  CBC:   Recent Labs   Lab 05/22/25  1716 05/23/25  0916 05/24/25  0248   WBC 3.54* 3.69* 4.22   HGB 10.8* 10.2* 9.4*   HCT 32.7* 31.4* 28.1*    230 186     CMP:   Recent Labs   Lab 05/22/25  1716 05/23/25  0916   * 134*   K 3.4* 3.7   CL 97 99   CO2 24 26    93   BUN 25* 22   CREATININE 0.8 0.7   CALCIUM 9.2 9.1   PROT 6.5  --    ALBUMIN 3.4*  --    BILITOT 0.7  --    ALKPHOS 78  --    AST 21  --    ALT 17  --    ANIONGAP 11 9       Significant Imaging: I have reviewed all pertinent imaging results/findings within the past 24 hours.

## 2025-05-24 NOTE — PROGRESS NOTES
[facility]  Hematology/Oncology  Progress Note    Patient Name: Essence Herrera  Admission Date: 5/22/2025  Hospital Length of Stay: 1 days  Code Status: Full Code     Subjective:     HPI:  This is a 75-year-old female well known to me with a malignant neoplasm of the right breast, triple negative, [id number], s/p neoadjuvant ACT.  Patient is scheduled for lumpectomy on 6/12/25.  She was admitted after presenting with complaints of shortness of breath for the past few weeks that had gradually worsened.  Patient was found to have a saddle pulmonary embolism and bilateral lower extremity DVTs.  She has since been admitted and plans to have a thrombectomy today.  I saw her at bedside.  She is in good spirits.  She is feeling better but continues have some shortness of breath.     Interval History: resting comfortably in bed. Breathing better.    Oncology Treatment Plan:   OP BREAST DOSE-DENSE AC-T (DOXORUBICIN CYCLOPHOSPHAMIDE Q2W FOLLOWED BY PACLITAXEL Q2W)    Medications:  Continuous Infusions:   0.9% NaCl    Continuous  mL/hr at 05/23/25 1850 250 mL at 05/23/25 1850     Scheduled Meds:   atorvastatin  10 mg Oral Daily    [START ON 5/25/2025] cefTRIAXone (Rocephin) IV (PEDS and ADULTS)  1 g Intravenous Q24H    enoxparin  1 mg/kg Subcutaneous Q12H (prophylaxis, 0900/2100)    mupirocin   Nasal BID     PRN Meds:  Current Facility-Administered Medications:     0.9% NaCl, , , Continuous PRN    acetaminophen, 1,000 mg, Oral, Q6H PRN    albuterol-ipratropium, 3 mL, Nebulization, Q6H PRN    aluminum-magnesium hydroxide-simethicone, 30 mL, Oral, QID PRN    dextrose 50%, 12.5 g, Intravenous, PRN    dextrose 50%, 25 g, Intravenous, PRN    glucagon (human recombinant), 1 mg, Intramuscular, PRN    glucose, 16 g, Oral, PRN    glucose, 24 g, Oral, PRN    HYDROmorphone, 1 mg, Intravenous, Q4H PRN    magnesium oxide, 800 mg, Oral, PRN    magnesium oxide, 800 mg, Oral, PRN    melatonin, 9 mg, Oral, Nightly PRN     naloxone, 0.02 mg, Intravenous, PRN    nitroGLYCERIN, 0.4 mg, Sublingual, Q5 Min PRN    ondansetron, 4 mg, Intravenous, Q6H PRN    potassium bicarbonate, 35 mEq, Oral, PRN    potassium bicarbonate, 50 mEq, Oral, PRN    potassium bicarbonate, 60 mEq, Oral, PRN    prochlorperazine, 5 mg, Intravenous, Q6H PRN    senna-docusate, 1 tablet, Oral, BID PRN    sodium chloride 0.9%, 10 mL, Intravenous, Q12H PRN     Review of Systems   Constitutional:  Negative for activity change, appetite change, fatigue and fever.   HENT:  Negative for mouth sores and postnasal drip.    Eyes:  Negative for visual disturbance.   Respiratory:  Negative for cough, chest tightness and shortness of breath.    Cardiovascular:  Negative for chest pain, palpitations and leg swelling.   Gastrointestinal:  Negative for abdominal distention, abdominal pain, blood in stool, diarrhea, nausea and vomiting.   Genitourinary:  Negative for difficulty urinating, dysuria, hematuria and urgency.   Musculoskeletal:  Negative for arthralgias, back pain and joint swelling.   Skin:  Negative for rash.   Neurological:  Negative for dizziness, weakness and headaches.   Hematological:  Negative for adenopathy. Does not bruise/bleed easily.   Psychiatric/Behavioral:  Negative for confusion and decreased concentration.      Objective:     Vital Signs (Most Recent):  Temp: 97.9 °F (36.6 °C) (05/24/25 0735)  Pulse: 82 (05/24/25 0900)  Resp: 12 (05/24/25 0900)  BP: (!) 113/59 (05/24/25 0900)  SpO2: 99 % (05/24/25 0900) Vital Signs (24h Range):  Temp:  [97.8 °F (36.6 °C)-98.1 °F (36.7 °C)] 97.9 °F (36.6 °C)  Pulse:  [] 82  Resp:  [8-30] 12  SpO2:  [89 %-100 %] 99 %  BP: ()/(51-69) 113/59     Weight: 86.4 kg (190 lb 7.6 oz)  Body mass index is 31.7 kg/m².  Body surface area is 1.99 meters squared.      Intake/Output Summary (Last 24 hours) at 5/24/2025 1712  Last data filed at 5/24/2025 0701  Gross per 24 hour   Intake 730 ml   Output 375 ml   Net 355 ml         Physical Exam  Vitals reviewed.   Constitutional:       Appearance: Normal appearance.   HENT:      Head: Normocephalic and atraumatic.   Eyes:      General: No scleral icterus.     Pupils: Pupils are equal, round, and reactive to light.   Cardiovascular:      Rate and Rhythm: Normal rate and regular rhythm.      Pulses: Normal pulses.      Heart sounds: Normal heart sounds.   Pulmonary:      Effort: Pulmonary effort is normal.      Breath sounds: Normal breath sounds.   Abdominal:      General: Bowel sounds are normal. There is no distension.   Musculoskeletal:         General: No swelling.      Right lower leg: Edema present.      Left lower leg: Edema present.   Lymphadenopathy:      Cervical: No cervical adenopathy.   Skin:     General: Skin is warm.      Findings: No rash.   Neurological:      General: No focal deficit present.      Mental Status: She is alert.          Significant Labs:   All pertinent labs from the last 24 hours have been reviewed.    Diagnostic Results:  I have reviewed and interpreted all pertinent imaging results/findings within the past 24 hours.  Assessment/Plan:     * Acute saddle pulmonary embolism without acute cor pulmonale  -patient s/p thrombectomy   -will need prolonged anticoagulation afterwards, okay to have a DOAC on discharge  -discussed with patient that this may delay her upcoming lumpectomy, but we will discuss this again when she returns to clinic after the hospitalization    Acute deep vein thrombosis (DVT) of both lower extremities  On Lovenox.  Will be discharged on Noac    Malignant neoplasm of upper-inner quadrant of right breast in female, estrogen receptor negative  -s/p neoadjuvant chemotherapy for triple negative stage I breast cancer  -scheduled for lumpectomy 6/12/25  -will see patient in clinic prior to this to discuss further and discuss with Dr. Sanchez as well    Severe obesity (BMI 35.0-39.9) with comorbidity  RECOMMENDED LIFESTYLE MODIFICATIONS FOR  BREAST CANCER PATIENTS:      Reviewed Lifestyle modifications which have shown benefit:  Limit alcohol consumption to less than 1 drink per day (1 ounce liquor, 6 oz wine, 8 oz beer) and nor more than 3 drinks per week.   Avoid smoking.  Exercise at least 150 minutes per week of moderate intensity aerobic activity or at least 75 minutes of vigorous activity. Exercise can lower the relative risk of breast cancer by ~18-20%.  Maintain healthy weight and avoid post-menopausal weight gain. Avoid processed foods and eat more lean proteins, fruits and vegetables.                     Elevated troponin  -Repeat troponin  continue to trend possibly due to demand   -ECG with sinus tachycardia and T-wave abnormality  -remains free of chest pain  -sublingual nitroglycerin PRN  -ECG p.r.n. chest pain  -continuous cardiac monitoring  -supplemental oxygen PRN  -no aspirin at this time due to possible surgery in a.m.  -on therapeutic Lovenox    Acute cystitis without hematuria  On Rocephine        Thank you for your consult. I will follow-up with patient. Please contact us if you have any additional questions.     Lorraine Hale MD  Hematology/Oncology  [facility]

## 2025-05-24 NOTE — ASSESSMENT & PLAN NOTE
RECOMMENDED LIFESTYLE MODIFICATIONS FOR BREAST CANCER PATIENTS:      Reviewed Lifestyle modifications which have shown benefit:  Limit alcohol consumption to less than 1 drink per day (1 ounce liquor, 6 oz wine, 8 oz beer) and nor more than 3 drinks per week.   Avoid smoking.  Exercise at least 150 minutes per week of moderate intensity aerobic activity or at least 75 minutes of vigorous activity. Exercise can lower the relative risk of breast cancer by ~18-20%.  Maintain healthy weight and avoid post-menopausal weight gain. Avoid processed foods and eat more lean proteins, fruits and vegetables.

## 2025-05-24 NOTE — PT/OT/SLP EVAL
Occupational Therapy   Evaluation    Name: Essence Herrera  MRN: [id number]  Admitting Diagnosis: Acute saddle pulmonary embolism without acute cor pulmonale  Recent Surgery: Procedure(s) (LRB):  Venogram (N/A) 1 Day Post-Op    Recommendations:     Discharge Recommendations: Low Intensity Therapy  Discharge Equipment Recommendations:  none  Barriers to discharge:  None    Assessment:     Essence Herrera is a 75 y.o. female with a medical diagnosis of Acute saddle pulmonary embolism without acute cor pulmonale.  She presents with general weakness. Performance deficits affecting function: weakness, impaired endurance, impaired self care skills, impaired functional mobility, gait instability, impaired balance, decreased safety awareness, decreased lower extremity function, decreased upper extremity function.      Rehab Prognosis: Fair; patient would benefit from acute skilled OT services to address these deficits and reach maximum level of function.       Plan:     Patient to be seen 5 x/week to address the above listed problems via self-care/home management, therapeutic activities, therapeutic exercises  Plan of Care Expires: 06/24/25  Plan of Care Reviewed with: patient, daughter    Subjective     Chief Complaint: General weakness  Patient/Family Comments/goals: Improved functional mobility and ADL independence.     Occupational Profile:  Living Environment: lives alone, but will temporarily reside at daughters home.   Previous level of function: modified independent with ADLs and IADLs.   Roles and Routines: primary homemaker  Equipment Used at Home: cane, straight, rollator  Assistance upon Discharge: Daughter    Pain/Comfort:  Pain Rating 1: 0/10  Pain Rating Post-Intervention 1: 0/10    Patients cultural, spiritual, Congregation conflicts given the current situation: no    Objective:     Communicated with: nurse prior to session.  Patient found up in chair with telemetry, peripheral IV, PureWick, oxygen, pulse ox  (continuous) upon OT entry to room.    General Precautions: Standard, fall  Orthopedic Precautions: N/A  Braces: N/A  Respiratory Status: Nasal cannula, flow 3 L/min    Occupational Performance:    Activities of Daily Living:  Feeding:  supervision to feed self from food tray sitting in chair.  Lower Body Dressing: contact guard assistance to don/doff socks sitting in chair.    Cognitive/Visual Perceptual:  Cognitive/Psychosocial Skills:     -       Oriented to: Person, Place, and Situation   -       Follows Commands/attention:Follows two-step commands  -       Communication: clear/fluent  -       Memory: No Deficits noted  -       Safety awareness/insight to disability: intact   -       Mood/Affect/Coping skills/emotional control: Cooperative and Pleasant  Visual/Perceptual:      -Intact Acuity    Physical Exam:  Balance:    -       Sitting: Contact Guard  Upper Extremity Range of Motion:     -       Right Upper Extremity: WFL  -       Left Upper Extremity: WFL  Upper Extremity Strength:    -       Right Upper Extremity: 4/5  -       Left Upper Extremity: 4/5   Strength:    -       Right Upper Extremity: WFL  -       Left Upper Extremity: WFL  Fine Motor Coordination:    -       Intact    AMPAC 6 Click ADL:  AMPAC Total Score: 18    Treatment & Education:  Patient and family educated on the purpose of Occupational Therapy and the importance of getting OOB.     Patient left up in chair with all lines intact and call button in reach    GOALS:   Multidisciplinary Problems       Occupational Therapy Goals          Problem: Occupational Therapy    Goal Priority Disciplines Outcome Interventions   Occupational Therapy Goal     OT, PT/OT     Description: Goals to be met by: 6/24/2025     Patient will increase functional independence with ADLs by performing:    UE Dressing with Supervision.  LE Dressing with Supervision.  Grooming while standing at sink with Supervision.  Toileting from toilet with Supervision for  hygiene and clothing management.   Toilet transfer to toilet with Supervision.                         History:     Past Medical History:   Diagnosis Date    Hyperlipidemia     Hypertension          Past Surgical History:   Procedure Laterality Date    BUNIONECTOMY Bilateral 1996    CARPAL TUNNEL RELEASE Bilateral 1998    DILATION AND CURETTAGE OF UTERUS      HYSTERECTOMY      TVH with conservation of the ovaries    INSERTION OF TUNNELED CENTRAL VENOUS CATHETER (CVC) WITH SUBCUTANEOUS PORT Right 12/16/2024    Procedure: [id number];  Surgeon: Juaquin Sanchez Jr., MD;  Location: [facility];  Service: General;  Laterality: Right;    ROTATOR CUFF REPAIR Right 2005    TUBAL LIGATION         Time Tracking:     OT Date of Treatment: 05/24/25  OT Start Time: 1001  OT Stop Time: 1011  OT Total Time (min): 10 min    Billable Minutes:Evaluation 10    5/24/2025

## 2025-05-24 NOTE — PLAN OF CARE
"    MIC[facility] care plan note    Dx: Acute saddle pulmonary embolism without acute cor pulmonale    Shift Events: No events throughout the night. Patient resting comfortably, VS WNL, and right groin cath site without redness/hematoma.     Goals of Care: To increase activity/mobility    Neuro: AAO x4, Follows Commands, and Moves All Extremities    Vital Signs: BP (!) 96/58   Pulse 93   Temp 98.1 °F (36.7 °C)   Resp (!) 30   Ht 5' 5" (1.651 m)   Wt 86.4 kg (190 lb 7.6 oz)   SpO2 100%   Breastfeeding No   BMI 31.70 kg/m²     Respiratory: Nasal Cannula    GI: proton pump inhibitor per orders no change in bowel habits    Thrombus: DVT prophylaxis. Pulmonary toilet including IS. Early ambulation.    Diet: Cardiac Diet    Gtts: N/A    Urine Output: Voids Spontaneously see charting cc/shift     Labs/Accuchecks: see results.    Skin: Intact        Plan of care reviewed with patient, Essence Herrera , verbalized understanding. Patient progressing toward goals of care. NAEON. Safety measures in place and maintained throughout shift.    "

## 2025-05-24 NOTE — ASSESSMENT & PLAN NOTE
Body mass index is 31.7 kg/m². Morbid obesity complicates all aspects of disease management from diagnostic modalities to treatment. Weight loss encouraged and health benefits explained to patient.

## 2025-05-24 NOTE — ASSESSMENT & PLAN NOTE
-patient s/p thrombectomy   -will need prolonged anticoagulation afterwards, okay to have a DOAC on discharge  -discussed with patient that this may delay her upcoming lumpectomy, but we will discuss this again when she returns to clinic after the hospitalization

## 2025-05-24 NOTE — HPI
This is a 75-year-old female well known to me with a malignant neoplasm of the right breast, triple negative, [id number], s/p neoadjuvant ACT.  Patient is scheduled for lumpectomy on 6/12/25.  She was admitted after presenting with complaints of shortness of breath for the past few weeks that had gradually worsened.  Patient was found to have a saddle pulmonary embolism and bilateral lower extremity DVTs.  She has since been admitted and plans to have a thrombectomy today.  I saw her at bedside.  She is in good spirits.  She is feeling better but continues have some shortness of breath.

## 2025-05-24 NOTE — PROGRESS NOTES
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 She reports left lower extremity swelling and worsening shortness of breath with exertion for the past 3 days. She states she was told by her PCP to report to the ED for increasing shortness of breath and tachycardia. She is preparing for a lumpectomy in June.  She denies any chest pain, fever, chills, dizziness, headaches, lightheadedness, numbness/tingling, bowel/urinary complications, abdominal pain, nausea, or vomiting.  ED workup includes:  CTA chest with extensive bilateral multifocal pulmonary thromboembolic disease with saddle pulmonary embolus without any evidence of right heart strain, airspace disease peripheral aspect right upper, middle, and lower lobe, likely pulmonary infarct. Bilateral lower extremity ultrasound with extensive left lower extremity DVT and right popliteal vein DVT.  Chest x-ray no acute findings. CBC white count 3.5 and H&H 10.8/32.7.  CMP with sodium 132, potassium 3.4, BUN 25, and albumin 3.5 otherwise unremarkable.  Elevated troponin initial 55.9, repeat 70.2, continue to trend.  ECG with sinus tachycardia 112 with some T-wave abnormality.  Echocardiogram pending.  Urinalysis positive nitrites and elevated WBCs > 100.  Started on Rocephin and urine  cultures pending.  Blood cultures pending, lactic acid 1.8.  ED MD notified Dr. Khoobehi, with vascular surgery, started on therapeutic Lovenox and placed NPO. Admitted to hospital medicine for further management and treatment.           Overview/Hospital Course:  Patient is a 75 year old female with history of breast cancer, recently completed chemotherapy. Patient presented with SOB, found to have bilateral lower extremity DVT and saddle PE. Patient is hemodynamically stable on 3 L oxygen. Denies any chest pain. Patient is on therapeutic lovenox. Echo ordered. Vascular has been consulted and patient underwent thrombectomy 5/23. Hematology and oncology also was consulted.     Interval History: Patient is feeling better. No SOB or chest pain. S/p thrombectomy. Daughter at bedside.     Review of Systems  Objective:     Vital Signs (Most Recent):  Temp: 97.9 °F (36.6 °C) (05/24/25 0735)  Pulse: 82 (05/24/25 0900)  Resp: 12 (05/24/25 0900)  BP: (!) 113/59 (05/24/25 0900)  SpO2: 99 % (05/24/25 0900) Vital Signs (24h Range):  Temp:  [97.8 °F (36.6 °C)-98.1 °F (36.7 °C)] 97.9 °F (36.6 °C)  Pulse:  [] 82  Resp:  [8-30] 12  SpO2:  [89 %-100 %] 99 %  BP: ()/(51-69) 113/59     Weight: 86.4 kg (190 lb 7.6 oz)  Body mass index is 31.7 kg/m².    Intake/Output Summary (Last 24 hours) at 5/24/2025 1123  Last data filed at 5/24/2025 0701  Gross per 24 hour   Intake 730 ml   Output 775 ml   Net -45 ml         Physical Exam  Vitals and nursing note reviewed.   Constitutional:       General: She is not in acute distress.     Appearance: She is not toxic-appearing.   HENT:      Head: Atraumatic.      Mouth/Throat:      Mouth: Mucous membranes are moist.      Pharynx: Oropharynx is clear.   Eyes:      General: No scleral icterus.     Conjunctiva/sclera: Conjunctivae normal.      Pupils: Pupils are equal, round, and reactive to light.   Cardiovascular:      Rate and Rhythm: Normal rate and regular rhythm.      Heart sounds:  No murmur heard.  Pulmonary:      Effort: No respiratory distress.      Breath sounds: No wheezing, rhonchi or rales.   Abdominal:      General: Abdomen is flat. Bowel sounds are normal.      Palpations: Abdomen is soft.   Musculoskeletal:         General: No swelling or deformity.      Cervical back: No rigidity or tenderness.   Skin:     Coloration: Skin is not jaundiced or pale.      Findings: No bruising, erythema or rash.   Neurological:      General: No focal deficit present.      Mental Status: She is alert and oriented to person, place, and time.      Cranial Nerves: No cranial nerve deficit.      Sensory: No sensory deficit.      Motor: No weakness.   Psychiatric:         Mood and Affect: Mood normal.         Behavior: Behavior normal.               Significant Labs: All pertinent labs within the past 24 hours have been reviewed.  CBC:   Recent Labs   Lab 05/22/25  1716 05/23/25  0916 05/24/25  0248   WBC 3.54* 3.69* 4.22   HGB 10.8* 10.2* 9.4*   HCT 32.7* 31.4* 28.1*    230 186     CMP:   Recent Labs   Lab 05/22/25  1716 05/23/25  0916   * 134*   K 3.4* 3.7   CL 97 99   CO2 24 26    93   BUN 25* 22   CREATININE 0.8 0.7   CALCIUM 9.2 9.1   PROT 6.5  --    ALBUMIN 3.4*  --    BILITOT 0.7  --    ALKPHOS 78  --    AST 21  --    ALT 17  --    ANIONGAP 11 9       Significant Imaging: I have reviewed all pertinent imaging results/findings within the past 24 hours.      Assessment & Plan  Acute saddle pulmonary embolism without acute cor pulmonale  S/p thrombectomy 5/23  Cont therapeutic Lovenox. DOAC on discharge     Essential hypertension  Patient's blood pressure range in the last 24 hours was: BP  Min: 89/51  Max: 116/60.The patient's inpatient anti-hypertensive regimen is listed below:  Current Antihypertensives  nitroGLYCERIN SL tablet 0.4 mg, Every 5 min PRN, Sublingual    Plan  - BP is controlled, no changes needed to their regimen    Hyperlipidemia  Resume statins       Severe obesity  (BMI 35.0-39.9) with comorbidity  Body mass index is 31.7 kg/m². Morbid obesity complicates all aspects of disease management from diagnostic modalities to treatment. Weight loss encouraged and health benefits explained to patient.       Acute deep vein thrombosis (DVT) of both lower extremities  Therapeutic Lovenox   PT, OT     Elevated troponin  -initial troponin 55.9, repeat troponin 70.2 continue to trend possibly due to demand   -ECG with sinus tachycardia and T-wave abnormality  -remains free of chest pain  -sublingual nitroglycerin PRN  -ECG p.r.n. chest pain  -continuous cardiac monitoring  -supplemental oxygen PRN  -no aspirin at this time due to possible surgery in a.m.  -on therapeutic Lovenox    Acute cystitis without hematuria  Urine culture GNR   Rocephin changed to 1G     Malignant neoplasm of upper-inner quadrant of right breast in female, estrogen receptor negative  -chronic condition noted  -right chest wall Port-A-Cath  -completed last round of Taxol 3 weeks ago  -lumpectomy next month in June    VTE Risk Mitigation (From admission, onward)           Ordered     enoxaparin injection 90 mg  Every 12 hours         05/23/25 0032     IP VTE HIGH RISK PATIENT  Once         05/23/25 0032                    Discharge Planning   STEWART: 5/28/2025     Code Status: Full Code   Medical Readiness for Discharge Date:   Discharge Plan A: Home with family                  Jorge Lo MD  Department of Hospital Medicine   [facility]

## 2025-05-24 NOTE — ASSESSMENT & PLAN NOTE
-Repeat troponin  continue to trend possibly due to demand   -ECG with sinus tachycardia and T-wave abnormality  -remains free of chest pain  -sublingual nitroglycerin PRN  -ECG p.r.n. chest pain  -continuous cardiac monitoring  -supplemental oxygen PRN  -no aspirin at this time due to possible surgery in a.m.  -on therapeutic Lovenox

## 2025-05-24 NOTE — PT/OT/SLP EVAL
Physical Therapy Evaluation    Patient Name:  Essence Herrera   MRN:  [id number]    Recommendations:     Discharge Recommendations: Low Intensity Therapy   Discharge Equipment Recommendations: none   Barriers to discharge: lives alone, 15 steps    Assessment:     Essence Herrera is a 75 y.o. female admitted with a medical diagnosis of Acute saddle pulmonary embolism without acute cor pulmonale.  She presents with the following impairments/functional limitations: weakness, impaired endurance, impaired sensation, impaired self care skills, impaired functional mobility, gait instability, impaired balance, decreased lower extremity function, decreased safety awareness, impaired cardiopulmonary response to activity     Found in bed, [place] with bed mobs and scoot EOB;  transfers sit to stand with RW Antolin;  amb x 2-3 steps RW Antolin.  OOB to chair    Rehab Prognosis: Good and Fair; patient would benefit from acute skilled PT services to address these deficits and reach maximum level of function.    Recent Surgery: Procedure(s) (LRB):  Venogram (N/A) 1 Day Post-Op    Plan:     During this hospitalization, patient to be seen 6 x/week to address the identified rehab impairments via gait training, therapeutic activities, therapeutic exercises, neuromuscular re-education and progress toward the following goals:    Plan of Care Expires:  06/24/25    Subjective     Chief Complaint: reports neuropathy knees to feet;  reports she has difficulty with short distance walking and donald 16 steps to apartment.   Patient/Family Comments/goals: return PLOF  Pain/Comfort:  Pain Rating 1: 0/10    Patients cultural, spiritual, Restoration conflicts given the current situation: no    Living Environment:  Lives alone, 16 steps to apartment; may go to dtr's home for asst after dc  Prior to admission, patients level of function was min to MOD indep.  Equipment used at home:  .  DME owned (not currently used): rolling walker and single point cane.  Upon  discharge, patient will have assistance from family.    Objective:     Communicated with patient, dtr and son prior to session.  Patient found HOB elevated with bed alarm, blood pressure cuff, oxygen, PureWick, peripheral IV, pulse ox (continuous), telemetry  upon PT entry to room.    General Precautions: Standard, fall  Orthopedic Precautions:N/A   Braces: N/A  Respiratory Status: Nasal cannula, flow   L/min    Exams:  RLE ROM: WFL  RLE Strength: fair-  LLE ROM: WFL  LLE Strength: fair+    Functional Mobility:  Bed Mobility:     Supine to Sit: minimum assistance  Transfers:     Sit to Stand:  minimum assistance with rolling walker      AM-PAC 6 CLICK MOBILITY  Total Score:16       Treatment & Education:  Safety, fall prec, pacing    Patient left up in chair with all lines intact, call button in reach, nurse notified, and dtr present.    GOALS:   Multidisciplinary Problems       Physical Therapy Goals          Problem: Physical Therapy    Goal Priority Disciplines Outcome Interventions   Physical Therapy Goal     PT, PT/OT     Description: Goals to be met by: 25     Patient will increase functional independence with mobility by performin. Supine to sit with [place]  2. Sit to stand transfer with Modified [place]  3. Gait  x 25 feet with Modified [place] using Rolling Walker.                          DME Justifications:  No DME recommended requiring DME justifications    History:     Past Medical History:   Diagnosis Date    Hyperlipidemia     Hypertension        Past Surgical History:   Procedure Laterality Date    BUNIONECTOMY Bilateral     CARPAL TUNNEL RELEASE Bilateral     DILATION AND CURETTAGE OF UTERUS      HYSTERECTOMY      TVH with conservation of the ovaries    INSERTION OF TUNNELED CENTRAL VENOUS CATHETER (CVC) WITH SUBCUTANEOUS PORT Right 2024    Procedure: [id number]-L-CATH;  Surgeon: Juaquin Sanchez Jr., MD;  Location: [facility];  Service: General;   Laterality: Right;    ROTATOR CUFF REPAIR Right 2005    TUBAL LIGATION         Time Tracking:     PT Received On: 05/24/25  PT Start Time: 0940     PT Stop Time: 1004  PT Total Time (min): 24 min     Billable Minutes: Evaluation 10 and Therapeutic Activity 14      05/24/2025

## 2025-05-25 LAB
ABSOLUTE EOSINOPHIL (SMH): 0.11 K/UL
ABSOLUTE MONOCYTE (SMH): 0.37 K/UL (ref 0.3–1)
ABSOLUTE NEUTROPHIL COUNT (SMH): 3.7 K/UL (ref 1.8–7.7)
ANION GAP (SMH): 9 MMOL/L (ref 8–16)
BACTERIA UR CULT: ABNORMAL
BASOPHILS # BLD AUTO: 0.02 K/UL
BASOPHILS NFR BLD AUTO: 0.4 %
BUN SERPL-MCNC: 20 MG/DL (ref 8–23)
CALCIUM SERPL-MCNC: 8.9 MG/DL (ref 8.7–10.5)
CHLORIDE SERPL-SCNC: 99 MMOL/L (ref 95–110)
CO2 SERPL-SCNC: 25 MMOL/L (ref 23–29)
CREAT SERPL-MCNC: 0.7 MG/DL (ref 0.5–1.4)
ERYTHROCYTE [DISTWIDTH] IN BLOOD BY AUTOMATED COUNT: 14.7 % (ref 11.5–14.5)
GFR SERPLBLD CREATININE-BSD FMLA CKD-EPI: >60 ML/MIN/1.73/M2
GLUCOSE SERPL-MCNC: 102 MG/DL (ref 70–110)
HCT VFR BLD AUTO: 27 % (ref 37–48.5)
HGB BLD-MCNC: 8.8 GM/DL (ref 12–16)
IMM GRANULOCYTES # BLD AUTO: 0.02 K/UL (ref 0–0.04)
IMM GRANULOCYTES NFR BLD AUTO: 0.4 % (ref 0–0.5)
LYMPHOCYTES # BLD AUTO: 0.48 K/UL (ref 1–4.8)
MCH RBC QN AUTO: 30.7 PG (ref 27–31)
MCHC RBC AUTO-ENTMCNC: 32.6 G/DL (ref 32–36)
MCV RBC AUTO: 94 FL (ref 82–98)
NUCLEATED RBC (/100WBC) (SMH): 0 /100 WBC
PLATELET # BLD AUTO: 261 K/UL (ref 150–450)
PLATELET BLD QL SMEAR: NORMAL
PMV BLD AUTO: 10 FL (ref 9.2–12.9)
POTASSIUM SERPL-SCNC: 4 MMOL/L (ref 3.5–5.1)
RBC # BLD AUTO: 2.87 M/UL (ref 4–5.4)
RELATIVE EOSINOPHIL (SMH): 2.4 % (ref 0–8)
RELATIVE LYMPHOCYTE (SMH): 10.3 % (ref 18–48)
RELATIVE MONOCYTE (SMH): 7.9 % (ref 4–15)
RELATIVE NEUTROPHIL (SMH): 78.6 % (ref 38–73)
SODIUM SERPL-SCNC: 133 MMOL/L (ref 136–145)
WBC # BLD AUTO: 4.66 K/UL (ref 3.9–12.7)

## 2025-05-25 PROCEDURE — 99900031 HC PATIENT EDUCATION (STAT)

## 2025-05-25 PROCEDURE — 63600175 PHARM REV CODE 636 W HCPCS: Mod: JZ

## 2025-05-25 PROCEDURE — 63600175 PHARM REV CODE 636 W HCPCS: Performed by: INTERNAL MEDICINE

## 2025-05-25 PROCEDURE — 25000003 PHARM REV CODE 250: Performed by: STUDENT IN AN ORGANIZED HEALTH CARE EDUCATION/TRAINING PROGRAM

## 2025-05-25 PROCEDURE — 99900035 HC TECH TIME PER 15 MIN (STAT)

## 2025-05-25 PROCEDURE — 80048 BASIC METABOLIC PNL TOTAL CA: CPT | Performed by: STUDENT IN AN ORGANIZED HEALTH CARE EDUCATION/TRAINING PROGRAM

## 2025-05-25 PROCEDURE — 36415 COLL VENOUS BLD VENIPUNCTURE: CPT | Performed by: STUDENT IN AN ORGANIZED HEALTH CARE EDUCATION/TRAINING PROGRAM

## 2025-05-25 PROCEDURE — 94761 N-INVAS EAR/PLS OXIMETRY MLT: CPT

## 2025-05-25 PROCEDURE — 25000003 PHARM REV CODE 250: Performed by: INTERNAL MEDICINE

## 2025-05-25 PROCEDURE — 85025 COMPLETE CBC W/AUTO DIFF WBC: CPT

## 2025-05-25 PROCEDURE — 25000003 PHARM REV CODE 250

## 2025-05-25 PROCEDURE — 20600001 HC STEP DOWN PRIVATE ROOM

## 2025-05-25 RX ORDER — TRAZODONE HYDROCHLORIDE 50 MG/1
50 TABLET ORAL NIGHTLY
Status: DISCONTINUED | OUTPATIENT
Start: 2025-05-25 | End: 2025-05-26 | Stop reason: HOSPADM

## 2025-05-25 RX ADMIN — TRAZODONE HYDROCHLORIDE 50 MG: 50 TABLET ORAL at 09:05

## 2025-05-25 RX ADMIN — ATORVASTATIN CALCIUM 10 MG: 10 TABLET, FILM COATED ORAL at 09:05

## 2025-05-25 RX ADMIN — ENOXAPARIN SODIUM 90 MG: 100 INJECTION SUBCUTANEOUS at 09:05

## 2025-05-25 RX ADMIN — MUPIROCIN 1 G: 20 OINTMENT TOPICAL at 09:05

## 2025-05-25 RX ADMIN — CEFTRIAXONE SODIUM 1 G: 1 INJECTION, POWDER, FOR SOLUTION INTRAMUSCULAR; INTRAVENOUS at 01:05

## 2025-05-25 RX ADMIN — Medication 9 MG: at 09:05

## 2025-05-25 RX ADMIN — HYDROMORPHONE HYDROCHLORIDE 1 MG: 1 INJECTION, SOLUTION INTRAMUSCULAR; INTRAVENOUS; SUBCUTANEOUS at 09:05

## 2025-05-25 NOTE — CARE UPDATE
05/24/25 2026   Patient Assessment/Suction   Level of Consciousness (AVPU) alert   Respiratory Effort Normal;Unlabored   Expansion/Accessory Muscles/Retractions no retractions;expansion symmetric;no use of accessory muscles   All Lung Fields Breath Sounds Anterior:;Lateral:;clear;diminished   Rhythm/Pattern, Respiratory no shortness of breath reported;depth regular;pattern regular;unlabored   Cough Frequency no cough   Skin Integrity   $ Wound Care Tech Time 15 min   Area Observed Behind ear;Right;Left;Cheek;Nares   Skin Appearance without discoloration   PRE-TX-O2   Device (Oxygen Therapy) nasal cannula   $ Is the patient on Low Flow Oxygen? Yes   Flow (L/min) (Oxygen Therapy) 2   SpO2 98 %   Pulse 81   Resp 18   Education   $ Education 15 min

## 2025-05-25 NOTE — PROGRESS NOTES
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 She reports left lower extremity swelling and worsening shortness of breath with exertion for the past 3 days. She states she was told by her PCP to report to the ED for increasing shortness of breath and tachycardia. She is preparing for a lumpectomy in June.  She denies any chest pain, fever, chills, dizziness, headaches, lightheadedness, numbness/tingling, bowel/urinary complications, abdominal pain, nausea, or vomiting.  ED workup includes:  CTA chest with extensive bilateral multifocal pulmonary thromboembolic disease with saddle pulmonary embolus without any evidence of right heart strain, airspace disease peripheral aspect right upper, middle, and lower lobe, likely pulmonary infarct. Bilateral lower extremity ultrasound with extensive left lower extremity DVT and right popliteal vein DVT.  Chest x-ray no acute findings. CBC white count 3.5 and H&H 10.8/32.7.  CMP with sodium 132, potassium 3.4, BUN 25, and albumin 3.5 otherwise unremarkable.  Elevated troponin initial 55.9, repeat 70.2, continue to trend.  ECG with sinus tachycardia 112 with some T-wave abnormality.  Echocardiogram pending.  Urinalysis positive nitrites and elevated WBCs > 100.  Started on Rocephin and urine cultures  pending.  Blood cultures pending, lactic acid 1.8.  ED MD notified Dr. Khoobehi, with vascular surgery, started on therapeutic Lovenox and placed NPO. Admitted to hospital medicine for further management and treatment.           Overview/Hospital Course:  Patient is a 75 year old female with history of breast cancer, recently completed chemotherapy. Patient presented with SOB, found to have bilateral lower extremity DVT and saddle PE. Patient is hemodynamically stable on 3 L oxygen. Denies any chest pain. Patient is on therapeutic lovenox. Echo ordered. Vascular has been consulted and patient underwent thrombectomy 5/23. Hematology and oncology also was consulted.  Patient's step-down from ICU to medicine floor.  Remained hemodynamically stable.  Remained on Lovenox.  Patient asymptomatic as far as UTI, antibiotics stopped.  Blood pressure soft, but stable.    Interval History:  Patient was seen and examined at bedside this morning.  She is doing well today.  Antibiotics stopped.  She is on room air.  Blood pressure is soft, 90s over 50s but stable.    Review of Systems  Objective:     Vital Signs (Most Recent):  Temp: 97.8 °F (36.6 °C) (05/25/25 1145)  Pulse: 88 (05/25/25 1100)  Resp: 18 (05/25/25 1100)  BP: (!) 99/57 (05/25/25 1100)  SpO2: (!) 90 % (05/25/25 1100) Vital Signs (24h Range):  Temp:  [97.8 °F (36.6 °C)-98.4 °F (36.9 °C)] 97.8 °F (36.6 °C)  Pulse:  [69-93] 88  Resp:  [12-28] 18  SpO2:  [89 %-100 %] 90 %  BP: ()/(51-58) 99/57     Weight: 90.6 kg (199 lb 11.8 oz)  Body mass index is 33.24 kg/m².    Intake/Output Summary (Last 24 hours) at 5/25/2025 1454  Last data filed at 5/25/2025 1329  Gross per 24 hour   Intake 840 ml   Output 300 ml   Net 540 ml         Physical Exam  Vitals and nursing note reviewed.   Constitutional:       General: She is not in acute distress.     Appearance: She is not toxic-appearing.   HENT:      Head: Atraumatic.      Mouth/Throat:      Mouth: Mucous membranes are  moist.      Pharynx: Oropharynx is clear.   Eyes:      General: No scleral icterus.     Conjunctiva/sclera: Conjunctivae normal.      Pupils: Pupils are equal, round, and reactive to light.   Cardiovascular:      Rate and Rhythm: Normal rate and regular rhythm.      Heart sounds: No murmur heard.  Pulmonary:      Effort: No respiratory distress.      Breath sounds: No wheezing, rhonchi or rales.   Abdominal:      General: Abdomen is flat. Bowel sounds are normal.      Palpations: Abdomen is soft.   Musculoskeletal:         General: No swelling or deformity.      Cervical back: No rigidity or tenderness.   Skin:     Coloration: Skin is not jaundiced or pale.      Findings: No bruising, erythema or rash.   Neurological:      General: No focal deficit present.      Mental Status: She is alert and oriented to person, place, and time.      Cranial Nerves: No cranial nerve deficit.      Sensory: No sensory deficit.      Motor: No weakness.   Psychiatric:         Mood and Affect: Mood normal.         Behavior: Behavior normal.               Significant Labs: All pertinent labs within the past 24 hours have been reviewed.  CBC:   Recent Labs   Lab 05/24/25  0248 05/25/25  1141   WBC 4.22 4.66   HGB 9.4* 8.8*   HCT 28.1* 27.0*    261     CMP:   Recent Labs   Lab 05/25/25  1141   *   K 4.0   CL 99   CO2 25      BUN 20   CREATININE 0.7   CALCIUM 8.9   ANIONGAP 9       Significant Imaging: I have reviewed all pertinent imaging results/findings within the past 24 hours.      Assessment & Plan  Acute saddle pulmonary embolism without acute cor pulmonale  S/p thrombectomy 5/23  Cont therapeutic Lovenox. DOAC on discharge     Severe obesity (BMI 35.0-39.9) with comorbidity  Body mass index is 33.24 kg/m². Morbid obesity complicates all aspects of disease management from diagnostic modalities to treatment. Weight loss encouraged and health benefits explained to patient.       Acute deep vein thrombosis (DVT) of  both lower extremities  Therapeutic Lovenox   PT, OT     Elevated troponin  -initial troponin 55.9, repeat troponin 70.2 continue to trend possibly due to demand   -ECG with sinus tachycardia and T-wave abnormality  -remains free of chest pain  -sublingual nitroglycerin PRN  -ECG p.r.n. chest pain  -continuous cardiac monitoring  -supplemental oxygen PRN  -no aspirin at this time due to possible surgery in a.m.  -on therapeutic Lovenox    Acute cystitis without hematuria  Patient states she never had any dysuria, or other symptoms of UTI   Antibiotics stopped    Malignant neoplasm of upper-inner quadrant of right breast in female, estrogen receptor negative  -chronic condition noted  -right chest wall Port-A-Cath  -completed last round of Taxol 3 weeks ago  -lumpectomy next month in June    VTE Risk Mitigation (From admission, onward)           Ordered     enoxaparin injection 90 mg  Every 12 hours         05/23/25 0032     IP VTE HIGH RISK PATIENT  Once         05/23/25 0032                    Discharge Planning   STEWART: 5/28/2025     Code Status: Full Code   Medical Readiness for Discharge Date:   Discharge Plan A: Home with family                Please place Justification for DME        Rene Christy DO  Department of Hospital Medicine   [facility]

## 2025-05-25 NOTE — NURSING
2130 Received patient from ICU. Pt AAOx4 and oriented to room. Pt on 2L NC and on a purewick. Pt bed locked and lowered. Call light in reach. All belongings within reach. Pt voices no complaints or concerns at the moment.

## 2025-05-25 NOTE — NURSING TRANSFER
Nursing Transfer Note      5/24/2025   9:23 PM    Reason patient is being transferred: no longer requiring icu level of care    Transfer To: stepdown rm 2505 frm icu rm 1008.    Transfer via bed    Transfer with 2L NC and cardiac monitoring  Transported by x2 RN    Telemetry: nsr  Order for Tele Monitor? Yes    Additional Lines: Oxygen, purewick    Medicines sent: n/a    Any special needs or follow-up needed: n/a    Patient belongings transferred with patient: Yes, cell phone and     Chart send with patient: Yes    Notified: daughter    Patient reassessed at: 5/24, 2110     Upon arrival to floor: cardiac monitor applied, patient oriented to room, call bell in reach, and bed in lowest position

## 2025-05-25 NOTE — ASSESSMENT & PLAN NOTE
Body mass index is 33.24 kg/m². Morbid obesity complicates all aspects of disease management from diagnostic modalities to treatment. Weight loss encouraged and health benefits explained to patient.

## 2025-05-25 NOTE — SUBJECTIVE & OBJECTIVE
Interval History:  Patient was seen and examined at bedside this morning.  She is doing well today.  Antibiotics stopped.  She is on room air.  Blood pressure is soft, 90s over 50s but stable.    Review of Systems  Objective:     Vital Signs (Most Recent):  Temp: 97.8 °F (36.6 °C) (05/25/25 1145)  Pulse: 88 (05/25/25 1100)  Resp: 18 (05/25/25 1100)  BP: (!) 99/57 (05/25/25 1100)  SpO2: (!) 90 % (05/25/25 1100) Vital Signs (24h Range):  Temp:  [97.8 °F (36.6 °C)-98.4 °F (36.9 °C)] 97.8 °F (36.6 °C)  Pulse:  [69-93] 88  Resp:  [12-28] 18  SpO2:  [89 %-100 %] 90 %  BP: ()/(51-58) 99/57     Weight: 90.6 kg (199 lb 11.8 oz)  Body mass index is 33.24 kg/m².    Intake/Output Summary (Last 24 hours) at 5/25/2025 1454  Last data filed at 5/25/2025 1329  Gross per 24 hour   Intake 840 ml   Output 300 ml   Net 540 ml         Physical Exam  Vitals and nursing note reviewed.   Constitutional:       General: She is not in acute distress.     Appearance: She is not toxic-appearing.   HENT:      Head: Atraumatic.      Mouth/Throat:      Mouth: Mucous membranes are moist.      Pharynx: Oropharynx is clear.   Eyes:      General: No scleral icterus.     Conjunctiva/sclera: Conjunctivae normal.      Pupils: Pupils are equal, round, and reactive to light.   Cardiovascular:      Rate and Rhythm: Normal rate and regular rhythm.      Heart sounds: No murmur heard.  Pulmonary:      Effort: No respiratory distress.      Breath sounds: No wheezing, rhonchi or rales.   Abdominal:      General: Abdomen is flat. Bowel sounds are normal.      Palpations: Abdomen is soft.   Musculoskeletal:         General: No swelling or deformity.      Cervical back: No rigidity or tenderness.   Skin:     Coloration: Skin is not jaundiced or pale.      Findings: No bruising, erythema or rash.   Neurological:      General: No focal deficit present.      Mental Status: She is alert and oriented to person, place, and time.      Cranial Nerves: No cranial  nerve deficit.      Sensory: No sensory deficit.      Motor: No weakness.   Psychiatric:         Mood and Affect: Mood normal.         Behavior: Behavior normal.               Significant Labs: All pertinent labs within the past 24 hours have been reviewed.  CBC:   Recent Labs   Lab 05/24/25  0248 05/25/25  1141   WBC 4.22 4.66   HGB 9.4* 8.8*   HCT 28.1* 27.0*    261     CMP:   Recent Labs   Lab 05/25/25  1141   *   K 4.0   CL 99   CO2 25      BUN 20   CREATININE 0.7   CALCIUM 8.9   ANIONGAP 9       Significant Imaging: I have reviewed all pertinent imaging results/findings within the past 24 hours.   PVC (premature ventricular contraction)

## 2025-05-26 VITALS
BODY MASS INDEX: 31.04 KG/M2 | DIASTOLIC BLOOD PRESSURE: 61 MMHG | HEIGHT: 65 IN | TEMPERATURE: 97 F | SYSTOLIC BLOOD PRESSURE: 106 MMHG | RESPIRATION RATE: 18 BRPM | HEART RATE: 83 BPM | OXYGEN SATURATION: 100 % | WEIGHT: 186.31 LBS

## 2025-05-26 PROBLEM — N39.0 E. COLI UTI: Status: ACTIVE | Noted: 2025-05-26

## 2025-05-26 PROBLEM — I24.89 DEMAND ISCHEMIA OF MYOCARDIUM: Status: ACTIVE | Noted: 2025-05-23

## 2025-05-26 PROBLEM — N39.0 E. COLI UTI: Status: ACTIVE | Noted: 2025-05-23

## 2025-05-26 PROBLEM — B96.20 E. COLI UTI: Status: ACTIVE | Noted: 2025-05-23

## 2025-05-26 PROBLEM — E87.6 HYPOKALEMIA: Status: ACTIVE | Noted: 2025-05-26

## 2025-05-26 PROBLEM — B96.20 E. COLI UTI: Status: ACTIVE | Noted: 2025-05-26

## 2025-05-26 PROBLEM — E87.1 HYPONATREMIA: Status: ACTIVE | Noted: 2025-05-26

## 2025-05-26 LAB
ABSOLUTE EOSINOPHIL (SMH): 0.11 K/UL
ABSOLUTE MONOCYTE (SMH): 0.36 K/UL (ref 0.3–1)
ABSOLUTE NEUTROPHIL COUNT (SMH): 2.9 K/UL (ref 1.8–7.7)
ANION GAP (SMH): 8 MMOL/L (ref 8–16)
BASOPHILS # BLD AUTO: 0.03 K/UL
BASOPHILS NFR BLD AUTO: 0.8 %
BUN SERPL-MCNC: 16 MG/DL (ref 8–23)
CALCIUM SERPL-MCNC: 8.8 MG/DL (ref 8.7–10.5)
CHLORIDE SERPL-SCNC: 100 MMOL/L (ref 95–110)
CO2 SERPL-SCNC: 24 MMOL/L (ref 23–29)
CREAT SERPL-MCNC: 0.6 MG/DL (ref 0.5–1.4)
ERYTHROCYTE [DISTWIDTH] IN BLOOD BY AUTOMATED COUNT: 14.8 % (ref 11.5–14.5)
GFR SERPLBLD CREATININE-BSD FMLA CKD-EPI: >60 ML/MIN/1.73/M2
GLUCOSE SERPL-MCNC: 94 MG/DL (ref 70–110)
HCT VFR BLD AUTO: 25.3 % (ref 37–48.5)
HGB BLD-MCNC: 8.1 GM/DL (ref 12–16)
IMM GRANULOCYTES # BLD AUTO: 0.04 K/UL (ref 0–0.04)
IMM GRANULOCYTES NFR BLD AUTO: 1.1 % (ref 0–0.5)
LYMPHOCYTES # BLD AUTO: 0.39 K/UL (ref 1–4.8)
MCH RBC QN AUTO: 30.5 PG (ref 27–31)
MCHC RBC AUTO-ENTMCNC: 32 G/DL (ref 32–36)
MCV RBC AUTO: 95 FL (ref 82–98)
NUCLEATED RBC (/100WBC) (SMH): 0 /100 WBC
PLATELET # BLD AUTO: 247 K/UL (ref 150–450)
PMV BLD AUTO: 10.6 FL (ref 9.2–12.9)
POTASSIUM SERPL-SCNC: 3.8 MMOL/L (ref 3.5–5.1)
RBC # BLD AUTO: 2.66 M/UL (ref 4–5.4)
RELATIVE EOSINOPHIL (SMH): 2.9 % (ref 0–8)
RELATIVE LYMPHOCYTE (SMH): 10.3 % (ref 18–48)
RELATIVE MONOCYTE (SMH): 9.5 % (ref 4–15)
RELATIVE NEUTROPHIL (SMH): 75.4 % (ref 38–73)
SODIUM SERPL-SCNC: 132 MMOL/L (ref 136–145)
WBC # BLD AUTO: 3.79 K/UL (ref 3.9–12.7)

## 2025-05-26 PROCEDURE — 27000221 HC OXYGEN, UP TO 24 HOURS

## 2025-05-26 PROCEDURE — 97116 GAIT TRAINING THERAPY: CPT | Mod: CQ

## 2025-05-26 PROCEDURE — 25000003 PHARM REV CODE 250

## 2025-05-26 PROCEDURE — 63600175 PHARM REV CODE 636 W HCPCS

## 2025-05-26 PROCEDURE — 94761 N-INVAS EAR/PLS OXIMETRY MLT: CPT

## 2025-05-26 PROCEDURE — 99900035 HC TECH TIME PER 15 MIN (STAT)

## 2025-05-26 PROCEDURE — 80048 BASIC METABOLIC PNL TOTAL CA: CPT | Performed by: STUDENT IN AN ORGANIZED HEALTH CARE EDUCATION/TRAINING PROGRAM

## 2025-05-26 PROCEDURE — 25000003 PHARM REV CODE 250: Performed by: INTERNAL MEDICINE

## 2025-05-26 PROCEDURE — 85025 COMPLETE CBC W/AUTO DIFF WBC: CPT

## 2025-05-26 PROCEDURE — 97530 THERAPEUTIC ACTIVITIES: CPT

## 2025-05-26 PROCEDURE — 36415 COLL VENOUS BLD VENIPUNCTURE: CPT

## 2025-05-26 RX ADMIN — ONDANSETRON 4 MG: 2 INJECTION INTRAMUSCULAR; INTRAVENOUS at 05:05

## 2025-05-26 RX ADMIN — MUPIROCIN 1 G: 20 OINTMENT TOPICAL at 08:05

## 2025-05-26 RX ADMIN — HYDROMORPHONE HYDROCHLORIDE 1 MG: 1 INJECTION, SOLUTION INTRAMUSCULAR; INTRAVENOUS; SUBCUTANEOUS at 05:05

## 2025-05-26 RX ADMIN — ATORVASTATIN CALCIUM 10 MG: 10 TABLET, FILM COATED ORAL at 08:05

## 2025-05-26 RX ADMIN — ENOXAPARIN SODIUM 90 MG: 100 INJECTION SUBCUTANEOUS at 08:05

## 2025-05-26 RX ADMIN — POTASSIUM BICARBONATE 50 MEQ: 977.5 TABLET, EFFERVESCENT ORAL at 06:05

## 2025-05-26 NOTE — ASSESSMENT & PLAN NOTE
Patient's most recent potassium results are listed below.   Recent Labs     05/25/25  1141 05/26/25  0420   K 4.0 3.8     Plan  - Replete potassium per protocol  - Monitor potassium Daily  - Patient's hypokalemia is improving  -

## 2025-05-26 NOTE — PLAN OF CARE
[facility] Met with patient to review discharge recommendation of Home Health and they are agreeable to plan.    Patient/family provided with a list of facilities in-network with patient's payor plan. Providers that are owned, operated, or affiliated with [facility] are included on the list.     Notified that referrals will be sent to the below listed facilities from in-network list based on proximity to home/family support:   [facility]  2.   [facility]   3.  4.  5. (can send more than 5)    Patient/family instructed to identify preference.    Preferred Facility: (if more than 1, listed in order of descending preference)  [facility]  [facility]    If an additional preferred facility not listed above is identified, additional referral to be sent. If above facilities unable to accept, will send additional referrals to in-network providers.

## 2025-05-26 NOTE — PT/OT/SLP PROGRESS
Physical Therapy Treatment    Patient Name:  Essence Herrera   MRN:  [id number]    Recommendations:     Discharge Recommendations: Low Intensity Therapy  Discharge Equipment Recommendations: none  Barriers to discharge: Decreased functional endurance, Inaccessible home (16 YO)    Assessment:     Essence Herrera is a 75 y.o. female admitted with a medical diagnosis of Acute saddle pulmonary embolism without acute cor pulmonale.  She presents with the following impairments/functional limitations: weakness, impaired endurance, impaired self care skills, impaired functional mobility, gait instability, impaired balance, decreased lower extremity function, impaired cardiopulmonary response to activity . Pt mobilized with CGA and RW. Slow kayla with gait trials with poor foot placement. Pt attributes this to her neuropathy.     Rehab Prognosis: Fair; patient would benefit from acute skilled PT services to address these deficits and reach maximum level of function.    Recent Surgery: Procedure(s) (LRB):  Venogram (N/A) 3 Days Post-Op    Plan:     During this hospitalization, patient to be seen 6 x/week to address the identified rehab impairments via gait training, therapeutic activities, therapeutic exercises, neuromuscular re-education and progress toward the following goals:    Plan of Care Expires:  06/24/25    Subjective     Chief Complaint: Fatigue  Patient/Family Comments/goals: Pt agreeable to PT.   Pain/Comfort:  Pain Rating 1: 0/10      Objective:     Communicated with RN prior to session.  Patient found HOB elevated with bed alarm, blood pressure cuff, PureWick, peripheral IV, pulse ox (continuous), telemetry upon PT entry to room.     General Precautions: Standard, fall  Orthopedic Precautions: N/A  Braces: N/A  Respiratory Status: Room air     Functional Mobility:  Bed Mobility:     Supine to Sit: contact guard assistance  Transfers:     Sit to Stand:  contact guard assistance with rolling walker  Gait: 120  feet CGA with rw      AM-PAC 6 CLICK MOBILITY          Treatment & Education:  Pt was educated on the following: call light use, importance of OOB activity and functional mobility to negate the negative effects of prolonged bed rest during this hospitalization, safe transfers/ambulation and discharge planning recommendations/options.     Patient left up in chair with all lines intact and call button in reach..    GOALS:   Multidisciplinary Problems       Physical Therapy Goals          Problem: Physical Therapy    Goal Priority Disciplines Outcome Interventions   Physical Therapy Goal     PT, PT/OT     Description: Goals to be met by: 25     Patient will increase functional independence with mobility by performin. Supine to sit with [place]  2. Sit to stand transfer with Modified [place]  3. Gait  x 25 feet with Modified [place] using Rolling Walker.                              Time Tracking:     PT Received On: 25  PT Start Time: 1015     PT Stop Time: 1029  PT Total Time (min): 14 min     Billable Minutes: Gait Training 14    Treatment Type: Treatment  PT/PTA: PTA     Number of PTA visits since last PT visit: 1     2025

## 2025-05-26 NOTE — PLAN OF CARE
Problem: Adult Inpatient Plan of Care  Goal: Plan of Care Review  Outcome: Ongoing  Goal: Patient-Specific Goal (Individualized)  Outcome: Ongoing  Goal: Absence of Hospital-Acquired Illness or Injury  Outcome: Ongoing  Goal: Optimal Comfort and Wellbeing  Outcome: Ongoing  Goal: Readiness for Transition of Care  Outcome: Ongoing     Problem: Wound  Goal: Optimal Coping  Outcome: Ongoing  Goal: Optimal Functional Ability  Outcome: Ongoing  Goal: Absence of Infection Signs and Symptoms  Outcome: Ongoing  Goal: Improved Oral Intake  Outcome: Ongoing  Goal: Optimal Pain Control and Function  Outcome: Ongoing  Goal: Skin Health and Integrity  Outcome: Ongoing  Goal: Optimal Wound Healing  Outcome: Ongoing     Problem: Fall Injury Risk  Goal: Absence of Fall and Fall-Related Injury  Outcome: Ongoing     Problem: Skin Injury Risk Increased  Goal: Skin Health and Integrity  Outcome: Ongoing

## 2025-05-26 NOTE — ASSESSMENT & PLAN NOTE
Body mass index is 31 kg/m². Morbid obesity complicates all aspects of disease management from diagnostic modalities to treatment. Weight loss encouraged and health benefits explained to patient.

## 2025-05-26 NOTE — PT/OT/SLP PROGRESS
Occupational Therapy   Treatment    Name: Essence Herrera  MRN: [id number]  Admitting Diagnosis:  Acute saddle pulmonary embolism without acute cor pulmonale  3 Days Post-Op    Recommendations:     Discharge Recommendations: Low Intensity Therapy  Discharge Equipment Recommendations:  none  Barriers to discharge:   none    Assessment:     Essence Herrera is a 75 y.o. female with a medical diagnosis of Acute saddle pulmonary embolism without acute cor pulmonale.   Performance deficits affecting function are weakness, impaired endurance, impaired self care skills, impaired functional mobility, gait instability, impaired balance.     Rehab Prognosis:  Fair; patient would benefit from acute skilled OT services to address these deficits and reach maximum level of function.       Plan:     Patient to be seen 5 x/week to address the above listed problems via self-care/home management, therapeutic activities, therapeutic exercises  Plan of Care Expires: 06/24/25  Plan of Care Reviewed with: patient    Subjective     Chief Complaint: none  Patient/Family Comments/goals: none stated  Pain/Comfort:  Pain Rating 1: 0/10  Pain Rating Post-Intervention 1: 0/10    Objective:     Communicated with: nurse prior to session.  Patient found up in chair with telemetry, pulse ox (continuous), peripheral IV, blood pressure cuff upon OT entry to room.    General Precautions: Standard, fall    Orthopedic Precautions:N/A  Braces: N/A  Respiratory Status: Room air     Occupational Performance:     Bed Mobility:    Patient completed Sit to Supine with stand by assistance     Functional Mobility/Transfers:  Patient completed Sit <> Stand Transfer with minimum assistance  with  rolling walker   Patient completed Bed <> Chair Transfer using Stand Pivot technique with minimum assistance with rolling walker    Activities of Daily Living:  NT; pt declined participation in ADLs due to fatigue; pure wick moved out of place during t/f training; OT  assisted to reposition pure wick     Treatment & Education:  Transfer training    Patient left HOB elevated with all lines intact, call button in reach, and bed alarm on    GOALS:   Multidisciplinary Problems       Occupational Therapy Goals          Problem: Occupational Therapy    Goal Priority Disciplines Outcome Interventions   Occupational Therapy Goal     OT, PT/OT     Description: Goals to be met by: 6/24/2025     Patient will increase functional independence with ADLs by performing:    UE Dressing with Supervision.  LE Dressing with Supervision.  Grooming while standing at sink with Supervision.  Toileting from toilet with Supervision for hygiene and clothing management.   Toilet transfer to toilet with Supervision.                         Time Tracking:     OT Date of Treatment: 05/26/25  OT Start Time: 1115  OT Stop Time: 1130  OT Total Time (min): 15 min    Billable Minutes:Therapeutic Activity 15    OT/MADELIN: OT          5/26/2025

## 2025-05-26 NOTE — PLAN OF CARE
Follow up with general surgery and hematology scheduled. Follow up with PCP unable to be scheduled by case management r/t holiday. Instructions to schedule follow up added to AVS with all other follow ups and home health agency. Pt to discharge with [facility] to start care tomorrow. No further needs known. Pt clear to discharge home with daughter to transport.      05/26/25 1345   Final Note   Assessment Type Final Discharge Note   Anticipated Discharge Disposition Home-Health   What phone number can be called within the next 1-3 days to see how you are doing after discharge? [phone]   Hospital Resources/Appts/Education Provided Provided patient/caregiver with written discharge plan information;Appointments scheduled and added to AVS;Post-Acute resouces added to AVS   Post-Acute Status   Post-Acute Authorization Home Health   Home Health Status Set-up Complete/Auth obtained   Discharge Delays None known at this time

## 2025-05-26 NOTE — PLAN OF CARE
05/26/25 1100   Discharge Reassessment   Assessment Type Discharge Planning Reassessment   Did the patient's condition or plan change since previous assessment? Yes   Discharge Plan discussed with: Patient   Communicated STEWART with patient/caregiver Yes   Discharge Plan A Home Health

## 2025-05-26 NOTE — DISCHARGE SUMMARY
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 She reports left lower extremity swelling and worsening shortness of breath with exertion for the past 3 days. She states she was told by her PCP to report to the ED for increasing shortness of breath and tachycardia. She is preparing for a lumpectomy in June.  She denies any chest pain, fever, chills, dizziness, headaches, lightheadedness, numbness/tingling, bowel/urinary complications, abdominal pain, nausea, or vomiting.  ED workup includes:  CTA chest with extensive bilateral multifocal pulmonary thromboembolic disease with saddle pulmonary embolus without any evidence of right heart strain, airspace disease peripheral aspect right upper, middle, and lower lobe, likely pulmonary infarct. Bilateral lower extremity ultrasound with extensive left lower extremity DVT and right popliteal vein DVT.  Chest x-ray no acute findings. CBC white count 3.5 and H&H 10.8/32.7.  CMP with sodium 132, potassium 3.4, BUN 25, and albumin 3.5 otherwise unremarkable.  Elevated troponin initial 55.9, repeat 70.2, continue to trend.  ECG with sinus tachycardia 112 with some T-wave abnormality.  Echocardiogram pending.  Urinalysis  "positive nitrites and elevated WBCs > 100.  Started on Rocephin and urine cultures pending.  Blood cultures pending, lactic acid 1.8.  ED MD notified Dr. Khoobehi, with vascular surgery, started on therapeutic Lovenox and placed NPO. Admitted to hospital medicine for further management and treatment.           Procedure(s) (LRB):  Venogram (N/A)      Hospital Course:   Patient is a 75 year old female with history of breast cancer, had recently completed chemotherapy. Patient presented with SOB, found to have bilateral lower extremity DVT and saddle PE. Patient was hemodynamically stable on 3 L oxygen. Denied any chest pain. Patient was started on weight-based Lovenox. Echo ordered. Vascular was consulted, and patient underwent thrombectomy 5/23. Hematology-oncology also was consulted.  Patient was "stepped down" from ICU to Stepdown unit.  Remained hemodynamically stable.  Remained on Lovenox.  For UTI, she received a total of 5 grams of ceftriaxone over a three-day period.  Urine culture grew E.coli.  Blood pressure soft, but stable.  We were able to wean her off of supplemental oxygen.  We discharged her home with prescription for Xarelto.    Physical Exam  Vitals and nursing note reviewed.   Constitutional:       General: She is not in acute distress.     Appearance: She is not toxic-appearing.    Cardiovascular:      Rate and Rhythm: Normal rate and regular rhythm.      Heart sounds: No murmur heard.  Pulmonary:      Effort: No respiratory distress.      Breath sounds: No wheezing, rhonchi or rales.      Goals of Care Treatment Preferences:  Code Status: Full Code      SDOH Screening:  The patient was screened for utility difficulties, food insecurity, transport difficulties, housing insecurity, and interpersonal safety and there were no concerns identified this admission.     Consults:   Consults (From admission, onward)          Status Ordering Provider     Inpatient consult to Hematology/Oncology  Once      "   Provider:  Khoobehi, Aurash, MD    Completed KHOOBEHI, ALI     Inpatient consult to Vascular Surgery  Once        Provider:  Khoobehi, Aurash, MD    Acknowledged ELLY HAGEN     Inpatient consult to Vascular Surgery  Once        Provider:  Khoobehi, Ali, MD    Completed ELLY HAGEN            Assessment & Plan  Acute saddle pulmonary embolism without acute cor pulmonale  S/p thrombectomy 5/23  Cont therapeutic Lovenox. DOAC on discharge     Severe obesity (BMI 35.0-39.9) with comorbidity  Body mass index is 31 kg/m². Morbid obesity complicates all aspects of disease management from diagnostic modalities to treatment. Weight loss encouraged and health benefits explained to patient.       Acute deep vein thrombosis (DVT) of both lower extremities  Therapeutic Lovenox   PT, OT     Demand ischemia of myocardium  -initial troponin 55.9, repeat troponin 70.2 continue to trend possibly due to demand   -ECG with sinus tachycardia and T-wave abnormality  -remains free of chest pain  -sublingual nitroglycerin PRN  -ECG p.r.n. chest pain  -continuous cardiac monitoring  -supplemental oxygen PRN  -no aspirin at this time due to possible surgery in a.m.  -on therapeutic Lovenox    E. coli UTI  Patient states she never had any dysuria, or other symptoms of UTI   Antibiotics stopped    Malignant neoplasm of upper-inner quadrant of right breast in female, estrogen receptor negative  -chronic condition noted  -right chest wall Port-A-Cath  -completed last round of Taxol 3 weeks ago  -lumpectomy next month in June    Hypokalemia  Patient's most recent potassium results are listed below.   Recent Labs     05/25/25  1141 05/26/25  0420   K 4.0 3.8     Plan  - Replete potassium per protocol  - Monitor potassium Daily  - Patient's hypokalemia is improving  -     Final Active Diagnoses:    Diagnosis Date Noted POA    PRINCIPAL PROBLEM:  Acute saddle pulmonary embolism without acute cor pulmonale [I26.92] 05/23/2025 Yes     Hypokalemia [E87.6] 05/26/2025 Yes    Acute deep vein thrombosis (DVT) of both lower extremities [I82.403] 05/23/2025 Yes    Demand ischemia of myocardium [I24.89] 05/23/2025 Yes    E. coli UTI [N39.0, B96.20] 05/23/2025 Yes    Severe obesity (BMI 35.0-39.9) with comorbidity [E66.01] 12/09/2024 Yes    Malignant neoplasm of upper-inner quadrant of right breast in female, estrogen receptor negative [C50.211, Z17.1] 12/09/2024 Not Applicable      Problems Resolved During this Admission:       Discharged Condition: good    Disposition: Home-Health Care [facility]    Follow Up:   Contact information for follow-up providers       Jose Manuel Chung MD. Go on 5/26/2025.    Specialty: Internal Medicine  Why: case management unable to schedule related to clinic being closed for holiday. please call them to schedule tomorrow for a follow up within 1wk.  Contact information:  [address] [place] [address] [address]  [place] [address]  [phone]               Juaquin Sanchez Jr., MD. Go on 6/12/2025.    Specialty: General Surgery  Why: please go to appointment for 7:45AM  Contact information:  [address] [facility]  [address]  [place] [address]  [phone]               Khoobehi, Aurash, MD. Go on 6/9/2025.    Specialties: Hematology and Oncology, Hematology, Oncology  Why: please go to appointment for 2PM  Contact information:  [address] [facility]  [address]  [place] [address]  [phone]                       Contact information for after-discharge care       Home Medical Care       [facility] Follow up.    Service: Home Medical   Why: start of care should be tomorrow, 5/27/25  Contact information:  [address]  [facility] [address] [phone]                                 Patient Instructions:      Ambulatory referral/consult to Home Health   Standing Status: Future   Referral Priority: Routine Referral Type: Home Health   Referral Reason: Specialty Services Required    Requested Specialty: Home Health Services   Number of Visits Requested: 1     Diet Adult Regular     Activity as tolerated     Medications:  Reconciled Home Medications:      Medication List        PAUSE taking these medications      olmesartan-hydrochlorothiazide 20-12.5 mg per tablet  Wait to take this until your doctor or other care provider tells you to start again.  Commonly known as: BENICAR HCT  Take 1 tablet by mouth once daily.            START taking these medications      atorvastatin 10 MG tablet  Commonly known as: LIPITOR  Take 10 mg by mouth once daily.     gabapentin 100 MG capsule  Commonly known as: NEURONTIN  Take 1 capsule (100 mg total) by mouth 3 (three) times daily.     MOUNJARO 5 mg/0.5 mL Pnij  Generic drug: tirzepatide  Inject 5 mg into the skin every 7 days.     * rivaroxaban 15 mg Tab  Commonly known as: XARELTO  Take 1 tablet (15 mg total) by mouth 2 (two) times daily with meals. for 21 days     * rivaroxaban 20 mg Tab  Commonly known as: XARELTO  Take 1 tablet (20 mg total) by mouth daily with dinner or evening meal.  Start taking on: June 17, 2025           * This list has 2 medication(s) that are the same as other medications prescribed for you. Read the directions carefully, and ask your doctor or other care provider to review them with you.                CHANGE how you take these medications      prochlorperazine 10 MG tablet  Commonly known as: COMPAZINE  Take 1 tablet (10 mg total) by mouth every 6 (six) hours as needed.  What changed: reasons to take this            CONTINUE taking these medications      LIDOcaine-prilocaine cream  Commonly known as: EMLA  Apply topically as needed (Apply to port site 30 minutes before access as needed).     ondansetron 8 MG Tbdl  Commonly known as: ZOFRAN-ODT  Take 1 tablet (8 mg total) by mouth every 8 (eight) hours as needed (Nausea).     traZODone 50 MG tablet  Commonly known as: DESYREL  Take 1 tablet (50 mg total) by mouth every  evening.            STOP taking these medications      dexAMETHasone 4 MG Tab  Commonly known as: DECADRON     diphenhydrAMINE-aluminum-magnesium hydroxide-simethicone-LIDOcaine viscous HCl 2%     magic mouthwash diphen/antac/lidoc     OLANZapine 5 MG tablet  Commonly known as: ZyPREXA     traMADoL 50 mg tablet  Commonly known as: ULTRAM              Indwelling Lines/Drains at time of discharge:   Lines/Drains/Airways       Central Venous Catheter Line  Duration                  PowerPort A Cath Single Lumen 12/16/24 0756 Subclavian Right 161 days                    Time spent on the discharge of patient: 25 minutes         José Miguel Barreto MD  Department of Hospital Medicine  On license of [facility]

## 2025-05-26 NOTE — NURSING
IV's discontinued. Patient tolerated well. Discharge instructions, new prescriptions and follow up appts explained to this patient and her daughter. They verbalized understanding. Patient discharged home with her daughter. NAD.

## 2025-05-26 NOTE — CARE UPDATE
05/26/25 0953   Home Oxygen Qualification   $ Home O2 Qualification Tech time 15 minutes   Room Air SpO2 At Rest 98 %   Room Air SpO2 During Ambulation 96 %   SpO2 Post Ambulation 98 %   Post Ambulation O2 LPM   (room air)

## 2025-05-26 NOTE — PLAN OF CARE
[facility] sent patient information to [facility] and [facility] home health services via [employer].

## 2025-05-27 LAB
BACTERIA BLD CULT: NORMAL
BACTERIA BLD CULT: NORMAL

## 2025-05-28 LAB
OHS QRS DURATION: 72 MS
OHS QTC CALCULATION: 455 MS

## 2025-06-09 ENCOUNTER — HOSPITAL ENCOUNTER (OUTPATIENT)
Dept: RADIOLOGY | Facility: HOSPITAL | Age: 75
Discharge: HOME | End: 2025-06-09
Attending: INTERNAL MEDICINE
Payer: MEDICARE

## 2025-06-09 DIAGNOSIS — Z17.1 MALIGNANT NEOPLASM OF UPPER-INNER QUADRANT OF RIGHT BREAST IN FEMALE, ESTROGEN RECEPTOR NEGATIVE: ICD-10-CM

## 2025-06-09 DIAGNOSIS — C50.211 MALIGNANT NEOPLASM OF UPPER-INNER QUADRANT OF RIGHT BREAST IN FEMALE, ESTROGEN RECEPTOR NEGATIVE: ICD-10-CM

## 2025-06-09 PROCEDURE — C8937 CAD BREAST MRI: HCPCS | Mod: TC,PO

## 2025-06-09 PROCEDURE — 77049 MRI BREAST C-+ W/CAD BI: CPT | Mod: 26,,, | Performed by: RADIOLOGY

## 2025-06-09 PROCEDURE — A9585 GADOBUTROL INJECTION: HCPCS | Mod: PO | Performed by: INTERNAL MEDICINE

## 2025-06-09 PROCEDURE — 25500020 PHARM REV CODE 255: Mod: PO | Performed by: INTERNAL MEDICINE

## 2025-06-09 RX ORDER — GADOBUTROL 604.72 MG/ML
8.5 INJECTION INTRAVENOUS
Status: COMPLETED | OUTPATIENT
Start: 2025-06-09 | End: 2025-06-09

## 2025-06-09 RX ADMIN — GADOBUTROL 8.5 ML: 604.72 INJECTION INTRAVENOUS at 02:06

## 2025-06-10 ENCOUNTER — TELEPHONE (OUTPATIENT)
Dept: SURGERY | Facility: CLINIC | Age: 75
End: 2025-06-10
Payer: MEDICARE

## 2025-06-10 NOTE — TELEPHONE ENCOUNTER
Advised that the pre-admit nurse is trying to contact her.  Gave her the phone number to call them back,

## 2025-06-10 NOTE — TELEPHONE ENCOUNTER
Copied from CRM #[id number]. Topic: General Inquiry - Return Call  >> Gerardo 10, 2025  9:37 AM Sara wrote:  Type:  Patient Returning Call    Who Called: PT/Dnl  Who Left Message for Patient: Paige   Does the patient know what this is regarding?: surg   Would the patient rather a call back or a response via [facility]? call  Best Call Back Number: [phone]    Additional Information:

## 2025-06-10 NOTE — TELEPHONE ENCOUNTER
Copied from CRM #[id number]. Topic: General Inquiry - Return Call  >> Gerardo 10, 2025  9:19 AM Daren wrote:  Type:  Patient Returning Call    Who Called:patient  Who Left Message for Patient:nurse  Does the patient know what this is regarding?:please call no other information given  Would the patient rather a call back or a response via [facility]?   Best Call Back Number:[phone]  Additional Information:

## 2025-06-10 NOTE — TELEPHONE ENCOUNTER
Daughter-in-law states that the patient was recently diagnosed with DVT and blood clots in her lungs and she is now on blood thinners so she needs to cancel her surgery on 6-12-25.  Advised I will notify Dr Sanchez and pre-admit/surgery.  She will call back when she is cleared to hold the blood thinner for 3 days

## 2025-06-19 ENCOUNTER — TELEPHONE (OUTPATIENT)
Dept: HEMATOLOGY/ONCOLOGY | Facility: CLINIC | Age: 75
End: 2025-06-19
Payer: MEDICARE

## 2025-06-24 ENCOUNTER — PATIENT MESSAGE (OUTPATIENT)
Dept: HEMATOLOGY/ONCOLOGY | Facility: CLINIC | Age: 75
End: 2025-06-24
Payer: MEDICARE

## 2025-06-24 ENCOUNTER — TELEPHONE (OUTPATIENT)
Dept: SURGERY | Facility: CLINIC | Age: 75
End: 2025-06-24
Payer: MEDICARE

## 2025-06-24 NOTE — TELEPHONE ENCOUNTER
Copied from CRM #[id number]. Topic: General Inquiry - Patient Advice  >> Jun 24, 2025  1:16 PM Juana wrote:  Type: Needs Medical Advice    Who Called: pt  Best Call Back Number:[phone]  Additional Information: Requesting a call back regarding  pt is needing office to please call her.    Please Advise- Thank you

## 2025-06-24 NOTE — TELEPHONE ENCOUNTER
Copied from CRM #[id number]. Topic: General Inquiry - Patient Advice  >> Jun 24, 2025 11:47 AM Sara wrote:  Type:  Patient Returning Call    Who Called: pt  Who Left Message for Patient:office  Does the patient know what this is regarding?:  Would the patient rather a call back or a response via [facility]? call  Best Call Back Number:[phone]    Additional Information:

## 2025-06-25 ENCOUNTER — HOSPITAL ENCOUNTER (OUTPATIENT)
Dept: PREADMISSION TESTING | Facility: HOSPITAL | Age: 75
Discharge: HOME | End: 2025-06-25
Attending: SURGERY
Payer: MEDICARE

## 2025-06-25 ENCOUNTER — HOSPITAL ENCOUNTER (EMERGENCY)
Facility: HOSPITAL | Age: 75
Discharge: HOME | End: 2025-06-25
Attending: STUDENT IN AN ORGANIZED HEALTH CARE EDUCATION/TRAINING PROGRAM
Payer: MEDICARE

## 2025-06-25 VITALS
WEIGHT: 185 LBS | BODY MASS INDEX: 30.82 KG/M2 | HEIGHT: 65 IN | RESPIRATION RATE: 17 BRPM | TEMPERATURE: 98 F | SYSTOLIC BLOOD PRESSURE: 91 MMHG | HEART RATE: 73 BPM | DIASTOLIC BLOOD PRESSURE: 57 MMHG | OXYGEN SATURATION: 96 %

## 2025-06-25 VITALS
SYSTOLIC BLOOD PRESSURE: 87 MMHG | OXYGEN SATURATION: 96 % | TEMPERATURE: 97 F | RESPIRATION RATE: 16 BRPM | HEART RATE: 84 BPM | DIASTOLIC BLOOD PRESSURE: 53 MMHG

## 2025-06-25 DIAGNOSIS — I95.9 HYPOTENSION, UNSPECIFIED HYPOTENSION TYPE: Primary | ICD-10-CM

## 2025-06-25 DIAGNOSIS — C50.211 MALIGNANT NEOPLASM OF UPPER-INNER QUADRANT OF RIGHT BREAST IN FEMALE, ESTROGEN RECEPTOR NEGATIVE: Primary | ICD-10-CM

## 2025-06-25 DIAGNOSIS — N17.9 AKI (ACUTE KIDNEY INJURY): ICD-10-CM

## 2025-06-25 DIAGNOSIS — Z17.1 MALIGNANT NEOPLASM OF UPPER-INNER QUADRANT OF RIGHT BREAST IN FEMALE, ESTROGEN RECEPTOR NEGATIVE: Primary | ICD-10-CM

## 2025-06-25 LAB
ABSOLUTE EOSINOPHIL (SMH): 0.17 K/UL
ABSOLUTE MONOCYTE (SMH): 0.54 K/UL (ref 0.3–1)
ABSOLUTE NEUTROPHIL COUNT (SMH): 4.3 K/UL (ref 1.8–7.7)
ALBUMIN SERPL-MCNC: 3.6 G/DL (ref 3.5–5.2)
ALP SERPL-CCNC: 80 UNIT/L (ref 55–135)
ALT SERPL-CCNC: 17 UNIT/L (ref 10–44)
ANION GAP (SMH): 10 MMOL/L (ref 8–16)
AST SERPL-CCNC: 19 UNIT/L (ref 10–40)
BACTERIA #/AREA URNS AUTO: ABNORMAL /HPF
BASOPHILS # BLD AUTO: 0.04 K/UL
BASOPHILS NFR BLD AUTO: 0.6 %
BILIRUB SERPL-MCNC: 0.5 MG/DL (ref 0.1–1)
BILIRUB UR QL STRIP.AUTO: ABNORMAL
BUN SERPL-MCNC: 31 MG/DL (ref 8–23)
CALCIUM SERPL-MCNC: 9.6 MG/DL (ref 8.7–10.5)
CAOX CRY UR QL COMP ASSIST: ABNORMAL
CHLORIDE SERPL-SCNC: 91 MMOL/L (ref 95–110)
CLARITY UR: ABNORMAL
CO2 SERPL-SCNC: 29 MMOL/L (ref 23–29)
COLOR UR AUTO: YELLOW
CREAT SERPL-MCNC: 2.6 MG/DL (ref 0.5–1.4)
ERYTHROCYTE [DISTWIDTH] IN BLOOD BY AUTOMATED COUNT: 15.1 % (ref 11.5–14.5)
GFR SERPLBLD CREATININE-BSD FMLA CKD-EPI: 19 ML/MIN/1.73/M2
GLUCOSE SERPL-MCNC: 99 MG/DL (ref 70–110)
GLUCOSE UR QL STRIP: NEGATIVE
HCT VFR BLD AUTO: 38 % (ref 37–48.5)
HGB BLD-MCNC: 12.3 GM/DL (ref 12–16)
HGB UR QL STRIP: ABNORMAL
HYALINE CASTS UR QL AUTO: 22 /LPF (ref 0–1)
IMM GRANULOCYTES # BLD AUTO: 0.02 K/UL (ref 0–0.04)
IMM GRANULOCYTES NFR BLD AUTO: 0.3 % (ref 0–0.5)
KETONES UR QL STRIP: NEGATIVE
LEUKOCYTE ESTERASE UR QL STRIP: ABNORMAL
LYMPHOCYTES # BLD AUTO: 1.9 K/UL (ref 1–4.8)
MCH RBC QN AUTO: 30.8 PG (ref 27–31)
MCHC RBC AUTO-ENTMCNC: 32.4 G/DL (ref 32–36)
MCV RBC AUTO: 95 FL (ref 82–98)
MICROSCOPIC COMMENT: ABNORMAL
NITRITE UR QL STRIP: NEGATIVE
NUCLEATED RBC (/100WBC) (SMH): 0 /100 WBC
PH UR STRIP: 5 [PH]
PLATELET # BLD AUTO: 258 K/UL (ref 150–450)
PMV BLD AUTO: 10.3 FL (ref 9.2–12.9)
POTASSIUM SERPL-SCNC: 3 MMOL/L (ref 3.5–5.1)
PROT SERPL-MCNC: 6.9 GM/DL (ref 6–8.4)
PROT UR QL STRIP: ABNORMAL
RBC # BLD AUTO: 3.99 M/UL (ref 4–5.4)
RBC #/AREA URNS AUTO: 2 /HPF
RELATIVE EOSINOPHIL (SMH): 2.4 % (ref 0–8)
RELATIVE LYMPHOCYTE (SMH): 27.2 % (ref 18–48)
RELATIVE MONOCYTE (SMH): 7.7 % (ref 4–15)
RELATIVE NEUTROPHIL (SMH): 61.8 % (ref 38–73)
SODIUM SERPL-SCNC: 130 MMOL/L (ref 136–145)
SP GR UR STRIP: 1.02
SQUAMOUS #/AREA URNS AUTO: 14 /HPF
UROBILINOGEN UR STRIP-ACNC: ABNORMAL EU/DL
WBC # BLD AUTO: 6.98 K/UL (ref 3.9–12.7)
WBC #/AREA URNS AUTO: 10 /HPF

## 2025-06-25 PROCEDURE — 81003 URINALYSIS AUTO W/O SCOPE: CPT | Performed by: PHYSICIAN ASSISTANT

## 2025-06-25 PROCEDURE — 80053 COMPREHEN METABOLIC PANEL: CPT | Performed by: PHYSICIAN ASSISTANT

## 2025-06-25 PROCEDURE — 85025 COMPLETE CBC W/AUTO DIFF WBC: CPT | Performed by: PHYSICIAN ASSISTANT

## 2025-06-25 PROCEDURE — 25000003 PHARM REV CODE 250: Performed by: STUDENT IN AN ORGANIZED HEALTH CARE EDUCATION/TRAINING PROGRAM

## 2025-06-25 PROCEDURE — 96360 HYDRATION IV INFUSION INIT: CPT

## 2025-06-25 PROCEDURE — 25000003 PHARM REV CODE 250: Performed by: PHYSICIAN ASSISTANT

## 2025-06-25 PROCEDURE — 99284 EMERGENCY DEPT VISIT MOD MDM: CPT

## 2025-06-25 RX ORDER — ENOXAPARIN SODIUM 100 MG/ML
INJECTION SUBCUTANEOUS
COMMUNITY
Start: 2025-06-18

## 2025-06-25 RX ORDER — CEFAZOLIN SODIUM 2 G/50ML
2 SOLUTION INTRAVENOUS
OUTPATIENT
Start: 2025-06-25

## 2025-06-25 RX ORDER — SODIUM CHLORIDE 9 MG/ML
1000 INJECTION, SOLUTION INTRAVENOUS
Status: COMPLETED | OUTPATIENT
Start: 2025-06-25 | End: 2025-06-25

## 2025-06-25 RX ORDER — ACETAMINOPHEN 500 MG
500 TABLET ORAL EVERY 6 HOURS PRN
COMMUNITY

## 2025-06-25 RX ORDER — SODIUM CHLORIDE 9 MG/ML
INJECTION, SOLUTION INTRAVENOUS CONTINUOUS
OUTPATIENT
Start: 2025-06-25

## 2025-06-25 RX ORDER — ZOLPIDEM TARTRATE 10 MG/1
10 TABLET ORAL NIGHTLY
COMMUNITY
Start: 2025-05-28

## 2025-06-25 RX ADMIN — SODIUM CHLORIDE 1000 ML: 9 INJECTION, SOLUTION INTRAVENOUS at 04:06

## 2025-06-25 RX ADMIN — POTASSIUM BICARBONATE 50 MEQ: 978 TABLET, EFFERVESCENT ORAL at 05:06

## 2025-06-25 NOTE — ED PROVIDER NOTES
Encounter Date: 6/25/2025       History     Chief Complaint   Patient presents with    LOW BP     SENT FROM SAME DAY SURGERY FOR EVAL OF LOW BP DURING HER PRE OP. PT DENIES ANY COMPLAINTS     HPI    Patient is a 75-year-old female with history of malignant neoplasm to the right breast currently on chemotherapy and bilateral lower extremity neuropathy presenting with low blood pressure from preop clinic.  Patient used to have hypertension and was on Benicar however was noted to have lower blood pressures since initiating chemotherapy.  Reports normal p.o. intake and denies any lightheadedness or weakness upon standing.  Her last has been a car was 2 days ago.  Patient otherwise has no complaints including no chest pain, shortness of breath, abdominal pain.  She has baseline neuropathy.    Review of patient's allergies indicates:  No Known Allergies  Past Medical History:   Diagnosis Date    Hyperlipidemia     Hypertension     Neuropathy      Past Surgical History:   Procedure Laterality Date    BUNIONECTOMY Bilateral 1996    CARPAL TUNNEL RELEASE Bilateral 1998    DILATION AND CURETTAGE OF UTERUS      HYSTERECTOMY      TVH with conservation of the ovaries    INSERTION OF TUNNELED CENTRAL VENOUS CATHETER (CVC) WITH SUBCUTANEOUS PORT Right 12/16/2024    Procedure: [id number];  Surgeon: Juaquin Sanchez Jr., MD;  Location: [facility] OR;  Service: General;  Laterality: Right;    PHLEBOGRAPHY N/A 5/23/2025    Procedure: Venogram;  Surgeon: Khoobehi, Ali, MD;  Location: [facility] CATH/EP LAB;  Service: Vascular;  Laterality: N/A;    ROTATOR CUFF REPAIR Right 2005    TUBAL LIGATION       Family History   Problem Relation Name Age of Onset    Breast cancer Maternal Grandfather      Breast cancer Maternal Aunt       Social History[1]  Review of Systems  As noted above  Physical Exam     Initial Vitals [06/25/25 1525]   BP Pulse Resp Temp SpO2   (!) 88/49 71 19 98 °F (36.7 °C) 97 %      MAP       --         Physical  Exam    Constitutional: She appears well-developed and well-nourished. She is not diaphoretic. No distress.   HENT:   Head: Normocephalic.   Cardiovascular:  Normal rate.           No murmur heard.  Pulmonary/Chest: Breath sounds normal.   Abdominal: Abdomen is soft. She exhibits no distension. There is no abdominal tenderness.   Musculoskeletal:         General: Normal range of motion.      Comments: Bilateral lower extremity pitting edema is present     Skin: Skin is warm and dry. No rash noted.         ED Course   Procedures  Labs Reviewed   COMPREHENSIVE METABOLIC PANEL - Abnormal       Result Value    Sodium 130 (*)     Potassium 3.0 (*)     Chloride 91 (*)     CO2 29      Glucose 99      BUN 31 (*)     Creatinine 2.6 (*)     Calcium 9.6      Protein Total 6.9      Albumin 3.6      Bilirubin Total 0.5      ALP 80      AST 19      ALT 17      Anion Gap 10      eGFR 19 (*)    URINALYSIS, REFLEX TO URINE CULTURE - Abnormal    Color, UA Yellow      Appearance, UA Hazy (*)     Spec Grav UA 1.025      pH, UA 5.0      Protein, UA 1+ (*)     Glucose, UA Negative      Ketones, UA Negative      Blood, UA 1+ (*)     Bilirubin, UA 1+ (*)     Urobilinogen, UA 2.0-3.0 (*)     Nitrites, UA Negative      Leukocyte Esterase, UA Trace (*)    CBC WITH DIFFERENTIAL - Abnormal    WBC 6.98      RBC 3.99 (*)     Hgb 12.3      Hct 38.0      MCV 95      MCH 30.8      MCHC 32.4      RDW 15.1 (*)     Platelet Count 258      MPV 10.3      Nucleated RBC 0      Neut % 61.8      Lymph % 27.2      Mono % 7.7      Eos % 2.4      Basophil % 0.6      Imm Grans % 0.3      Neut # 4.3      Lymph # 1.90      Mono # 0.54      Eos # 0.17      Baso # 0.04      Imm Grans # 0.02     URINALYSIS MICROSCOPIC - Abnormal    RBC, UA 2      WBC, UA 10 (*)     Bacteria, UA Occasional      Squamous Epithelial Cells, UA 14      Hyaline Casts, UA 22 (*)     Calcium Oxalate Crystals, UA Rare      Microscopic Comment       CBC W/ AUTO DIFFERENTIAL    Narrative:      The following orders were created for panel order CBC auto differential.  Procedure                               Abnormality         Status                     ---------                               -----------         ------                     CBC with Differential[[id number]]       Abnormal            Final result                 Please view results for these tests on the individual orders.          Imaging Results    None          Medications   0.9% NaCl infusion (0 mLs Intravenous Stopped 6/25/25 1730)   potassium bicarbonate disintegrating tablet 50 mEq (50 mEq Oral Given 6/25/25 1725)     Medical Decision Making  75-year-old female with malignant right-sided breast cancer on chemotherapy who was sent from clinic for little blood pressure.  She is asymptomatic and denies any lightheadedness upon standing.  Reports adequate p.o. intake.  Patient does state she has had significant diarrhea over the past several days after taking MiraLax.  Vital signs here 91/57.  Overall patient appears well without any significant findings on exam.  Patient given 1 L fluids.  Labs reviewed notable for significant increasing creatinine from normal to 2.6 today.  She does have low potassium at 3.0 and mildly low sodium.  Labs otherwise overall unremarkable.  Patient given L of fluids.  Offered admission to ensure improvement of CADENCE and further workup however patient would prefer to go home with close PCP follow up.  Given that she appears well and feels well, feel this is reasonable with strict return precautions.      Yuri Adkins MD  Emergency Medicine      Risk  Prescription drug management.                                      Clinical Impression:  Final diagnoses:  [I95.9] Hypotension, unspecified hypotension type (Primary)  [N17.9] CADENCE (acute kidney injury)          ED Disposition Condition    Discharge Stable          ED Prescriptions    None       Follow-up Information       Follow up With Specialties Details Why  Contact Info Additional Information    [facility] - Emergency Dept Emergency Medicine  As needed, If symptoms worsen including lightheadedness, weakness, syncope, fatigue, other concerns [address] [facility] [id number] [phone] 1st floor    Jose Manuel Chung MD Internal Medicine Schedule an appointment as soon as possible for a visit in 1 day For follow-up on CADENCE and repeat BMP to assess kidney function and potassium level [address] [place] [address] [address]  [facility] [address]  [phone]                      [1]   Social History  Tobacco Use    Smoking status: Never    Smokeless tobacco: Never   Vaping Use    Vaping status: Never Used   Substance Use Topics    Alcohol use: Not Currently    Drug use: Never        Yuri Adkins MD  06/25/25 3426

## 2025-06-25 NOTE — PRE ADMISSION SCREENING
"Preop complete with pt and daughter. BP down 70's/40's . Pt states she does get dizzy, eyes "glassy" occasional feeling of lightheadedness. Discussed need for ER eval. Pt states ok.pt sent to ER in wheelchair  "

## 2025-06-25 NOTE — DISCHARGE INSTRUCTIONS
To confirm, Your doctor has instructed you that surgery is scheduled for:  Monday 6/30/25    Pre-Op will call the afternoon prior to surgery between 4:00 and 6:00 PM with the final arrival time.      [facility]    Do not eat anything after midnight the night before your surgery - You may have clear liquids up to 2 hours before coming in for surgery. This includes water, Gatorade, clear juice,     YOU MAY BRUSH YOUR TEETH     TAKE APPROVED  MEDICATIONS WITH A SMALL SIP OF WATER THE MORNING OF YOUR PROCEDURE: See Medication list    ONLY if you are diabetic, check your sugar in the morning before your procedure.       Do not take any diabetic medicines or insulin the morning of surgery .     PLEASE NOTE:  The surgery schedule has many variables which may affect the time of your surgery case.  Family members should be available if your surgery time changes.  Plan to be here the day of your procedure between 4-6 hours.    DO NOT TAKE THESE MEDICATIONS 5-7 DAYS PRIOR to your procedure or per your surgeon's request: ASPIRIN, ALEVE, ADVIL, IBUPROFEN,  ANNE SELTZER, BC , FISH OIL , VITAMIN E, HERBALS  (May take Tylenol)    ONLY if you are prescribed any types of blood thinners such as:  Aspirin, Coumadin, Plavix, Pradaxa, Xarelto, Aggrenox, Effient, Eliquis, Savasya, Brilinta, or any other, ask your surgeon whether you should stop taking them and how long before surgery you should stop.  You may also need to verify with the prescribing physician if it is ok to stop your medication.                                                     IMPORTANT INSTRUCTIONS    Do not smoke, vape or drink alcoholic beverages 24 hours prior to your procedure.  Shower the night before AND the morning of your procedure with a Chlorhexidine wash such as Hibiclens or Dial antibacterial soap from the neck down.    Do not get it on your face or in your eyes.  You may use your own shampoo and face wash. This helps  your skin to be as bacteria free as possible.   Do not apply any deodorant, lotion or powder after you shower.   DO NOT remove hair from the surgery site.  Do not shave the incision site unless you are given specific instructions to do so.    Sleep in a bed with clean sheets.  Do not sleep with a pet in the bed.   If you wear contact lenses, dentures, hearing aids or glasses, bring a container to put them in during surgery and give to a family member for safe keeping.    Please leave all jewelry, piercing's and valuables at home.   If your doctor has scheduled you for an overnight stay, bring a small overnight bag with any personal items you need.  Wear comfortable clothing that is easy to remove and place back on after surgery.     Make arrangements in advance for transportation home by a responsible adult.    You must make arrangements for transportation, TAXI'S, UBER'S OR LYFTS ARE NOT ALLOWED.      If you have any questions about these instructions, call Pre-Op Admit  Nursing at [phone] ,  or call  Pre-Op Day Surgery Unit at [phone]

## 2025-06-25 NOTE — FIRST PROVIDER EVALUATION
Emergency Department TeleTriage Encounter Note      CHIEF COMPLAINT    Chief Complaint   Patient presents with    LOW BP     SENT FROM SAME DAY SURGERY FOR EVAL OF LOW BP DURING HER PRE OP. PT DENIES ANY COMPLAINTS       VITAL SIGNS   Initial Vitals [06/25/25 1525]   BP Pulse Resp Temp SpO2   (!) 88/49 71 19 98 °F (36.7 °C) 97 %      MAP       --            ALLERGIES    Review of patient's allergies indicates:  No Known Allergies    PROVIDER TRIAGE NOTE  Patient was at pre-op physical today and sent to the ED due to hypotension. She reports having diarrhea x2 days prior to the eval. Her baseline BP is on the lower end of normal.       ORDERS  Labs Reviewed - No data to display    ED Orders (720h ago, onward)      None              Virtual Visit Note: The provider triage portion of this emergency department evaluation and documentation was performed via [employer], a HIPAA-compliant telemedicine application, in concert with a tele-presenter in the room. A face to face patient evaluation with one of my colleagues will occur once the patient is placed in an emergency department room.      DISCLAIMER: This note was prepared with "[employer] voice recognition transcription software. Garbled syntax, mangled pronouns, and other bizarre constructions may be attributed to that software system.

## 2025-06-25 NOTE — DISCHARGE INSTRUCTIONS
Recommend following with the PCP within 1-2 days for repeat assessment of kidney function and potassium level.  Drink plenty of fluids.  Return with any symptoms including fatigue, weakness, lightheadedness, or other concerns.

## 2025-06-26 ENCOUNTER — LAB VISIT (OUTPATIENT)
Dept: LAB | Facility: HOSPITAL | Age: 75
End: 2025-06-26
Attending: INTERNAL MEDICINE
Payer: MEDICARE

## 2025-06-26 DIAGNOSIS — E86.9 VOLUME DEPLETION, UNSPECIFIED: Primary | ICD-10-CM

## 2025-06-26 LAB
ABSOLUTE EOSINOPHIL (SMH): 0.12 K/UL
ABSOLUTE MONOCYTE (SMH): 0.34 K/UL (ref 0.3–1)
ABSOLUTE NEUTROPHIL COUNT (SMH): 2.4 K/UL (ref 1.8–7.7)
ANION GAP (SMH): 11 MMOL/L (ref 8–16)
BASOPHILS # BLD AUTO: 0.03 K/UL
BASOPHILS NFR BLD AUTO: 0.7 %
BUN SERPL-MCNC: 25 MG/DL (ref 8–23)
CALCIUM SERPL-MCNC: 9.3 MG/DL (ref 8.7–10.5)
CHLORIDE SERPL-SCNC: 96 MMOL/L (ref 95–110)
CO2 SERPL-SCNC: 23 MMOL/L (ref 23–29)
CREAT SERPL-MCNC: 1 MG/DL (ref 0.5–1.4)
ERYTHROCYTE [DISTWIDTH] IN BLOOD BY AUTOMATED COUNT: 14.9 % (ref 11.5–14.5)
GFR SERPLBLD CREATININE-BSD FMLA CKD-EPI: 59 ML/MIN/1.73/M2
GLUCOSE SERPL-MCNC: 114 MG/DL (ref 70–110)
HCT VFR BLD AUTO: 37.5 % (ref 37–48.5)
HGB BLD-MCNC: 12 GM/DL (ref 12–16)
IMM GRANULOCYTES # BLD AUTO: 0.02 K/UL (ref 0–0.04)
IMM GRANULOCYTES NFR BLD AUTO: 0.5 % (ref 0–0.5)
LYMPHOCYTES # BLD AUTO: 1.35 K/UL (ref 1–4.8)
MAGNESIUM SERPL-MCNC: 2.3 MG/DL (ref 1.6–2.6)
MCH RBC QN AUTO: 31 PG (ref 27–31)
MCHC RBC AUTO-ENTMCNC: 32 G/DL (ref 32–36)
MCV RBC AUTO: 97 FL (ref 82–98)
NUCLEATED RBC (/100WBC) (SMH): 0 /100 WBC
PLATELET # BLD AUTO: 206 K/UL (ref 150–450)
PMV BLD AUTO: 10.8 FL (ref 9.2–12.9)
POTASSIUM SERPL-SCNC: 3.8 MMOL/L (ref 3.5–5.1)
RBC # BLD AUTO: 3.87 M/UL (ref 4–5.4)
RELATIVE EOSINOPHIL (SMH): 2.8 % (ref 0–8)
RELATIVE LYMPHOCYTE (SMH): 31.9 % (ref 18–48)
RELATIVE MONOCYTE (SMH): 8 % (ref 4–15)
RELATIVE NEUTROPHIL (SMH): 56.1 % (ref 38–73)
SODIUM SERPL-SCNC: 130 MMOL/L (ref 136–145)
WBC # BLD AUTO: 4.23 K/UL (ref 3.9–12.7)

## 2025-06-26 PROCEDURE — 80048 BASIC METABOLIC PNL TOTAL CA: CPT

## 2025-06-26 PROCEDURE — 36415 COLL VENOUS BLD VENIPUNCTURE: CPT

## 2025-06-26 PROCEDURE — 85025 COMPLETE CBC W/AUTO DIFF WBC: CPT

## 2025-06-26 PROCEDURE — 83735 ASSAY OF MAGNESIUM: CPT

## 2025-06-27 NOTE — CARE UPDATE
Spoke to patient - states stopped xarelto 6/25/25 and started lovenox 6/26 - states asymptomatic and BP has not been decreased.

## 2025-06-30 ENCOUNTER — HOSPITAL ENCOUNTER (OUTPATIENT)
Dept: RADIOLOGY | Facility: HOSPITAL | Age: 75
Discharge: HOME | End: 2025-06-30
Attending: SURGERY | Admitting: SURGERY
Payer: MEDICARE

## 2025-06-30 ENCOUNTER — ANESTHESIA EVENT (OUTPATIENT)
Dept: SURGERY | Facility: HOSPITAL | Age: 75
End: 2025-06-30
Payer: MEDICARE

## 2025-06-30 ENCOUNTER — ANESTHESIA (OUTPATIENT)
Dept: SURGERY | Facility: HOSPITAL | Age: 75
End: 2025-06-30
Payer: MEDICARE

## 2025-06-30 ENCOUNTER — HOSPITAL ENCOUNTER (OUTPATIENT)
Facility: HOSPITAL | Age: 75
Discharge: HOME | End: 2025-06-30
Attending: SURGERY | Admitting: SURGERY
Payer: MEDICARE

## 2025-06-30 VITALS
WEIGHT: 185 LBS | SYSTOLIC BLOOD PRESSURE: 120 MMHG | DIASTOLIC BLOOD PRESSURE: 71 MMHG | HEIGHT: 65 IN | HEART RATE: 56 BPM | OXYGEN SATURATION: 100 % | TEMPERATURE: 98 F | BODY MASS INDEX: 30.82 KG/M2 | RESPIRATION RATE: 16 BRPM

## 2025-06-30 DIAGNOSIS — C50.211 MALIGNANT NEOPLASM OF UPPER-INNER QUADRANT OF RIGHT BREAST IN FEMALE, ESTROGEN RECEPTOR NEGATIVE: ICD-10-CM

## 2025-06-30 DIAGNOSIS — Z41.9 SURGERY, ELECTIVE: Primary | ICD-10-CM

## 2025-06-30 DIAGNOSIS — Z17.1 MALIGNANT NEOPLASM OF UPPER-INNER QUADRANT OF RIGHT BREAST IN FEMALE, ESTROGEN RECEPTOR NEGATIVE: ICD-10-CM

## 2025-06-30 DIAGNOSIS — N63.0 BREAST MASS: ICD-10-CM

## 2025-06-30 LAB
ANION GAP (SMH): 7 MMOL/L (ref 8–16)
BUN SERPL-MCNC: 10 MG/DL (ref 8–23)
CALCIUM SERPL-MCNC: 10 MG/DL (ref 8.7–10.5)
CHLORIDE SERPL-SCNC: 101 MMOL/L (ref 95–110)
CO2 SERPL-SCNC: 28 MMOL/L (ref 23–29)
CREAT SERPL-MCNC: 0.8 MG/DL (ref 0.5–1.4)
GFR SERPLBLD CREATININE-BSD FMLA CKD-EPI: >60 ML/MIN/1.73/M2
GLUCOSE SERPL-MCNC: 100 MG/DL (ref 70–110)
POTASSIUM SERPL-SCNC: 3.9 MMOL/L (ref 3.5–5.1)
SODIUM SERPL-SCNC: 136 MMOL/L (ref 136–145)

## 2025-06-30 PROCEDURE — 19301 PARTIAL MASTECTOMY: CPT | Mod: RT,,, | Performed by: SURGERY

## 2025-06-30 PROCEDURE — 25000003 PHARM REV CODE 250

## 2025-06-30 PROCEDURE — 71000015 HC POSTOP RECOV 1ST HR: Performed by: SURGERY

## 2025-06-30 PROCEDURE — 36000707: Performed by: SURGERY

## 2025-06-30 PROCEDURE — 82310 ASSAY OF CALCIUM: CPT | Performed by: SURGERY

## 2025-06-30 PROCEDURE — 37000008 HC ANESTHESIA 1ST 15 MINUTES: Performed by: SURGERY

## 2025-06-30 PROCEDURE — 36000706: Performed by: SURGERY

## 2025-06-30 PROCEDURE — 63600175 PHARM REV CODE 636 W HCPCS: Performed by: SURGERY

## 2025-06-30 PROCEDURE — 88342 IMHCHEM/IMCYTCHM 1ST ANTB: CPT | Mod: TC | Performed by: PATHOLOGY

## 2025-06-30 PROCEDURE — 38792 RA TRACER ID OF SENTINL NODE: CPT | Mod: RT,,, | Performed by: RADIOLOGY

## 2025-06-30 PROCEDURE — 38900 IO MAP OF SENT LYMPH NODE: CPT | Mod: RT,,, | Performed by: SURGERY

## 2025-06-30 PROCEDURE — 37000009 HC ANESTHESIA EA ADD 15 MINS: Performed by: SURGERY

## 2025-06-30 PROCEDURE — 63600175 PHARM REV CODE 636 W HCPCS

## 2025-06-30 PROCEDURE — 19281 PERQ DEVICE BREAST 1ST IMAG: CPT | Mod: XS,RT,, | Performed by: RADIOLOGY

## 2025-06-30 PROCEDURE — 63600175 PHARM REV CODE 636 W HCPCS: Mod: JZ | Performed by: ANESTHESIOLOGY

## 2025-06-30 PROCEDURE — 71000039 HC RECOVERY, EACH ADD'L HOUR: Performed by: SURGERY

## 2025-06-30 PROCEDURE — 38525 BIOPSY/REMOVAL LYMPH NODES: CPT | Mod: 51,RT,, | Performed by: SURGERY

## 2025-06-30 PROCEDURE — 25000003 PHARM REV CODE 250: Performed by: SURGERY

## 2025-06-30 PROCEDURE — A9541 TC99M SULFUR COLLOID: HCPCS | Performed by: SURGERY

## 2025-06-30 PROCEDURE — C1819 TISSUE LOCALIZATION-EXCISION: HCPCS | Performed by: SURGERY

## 2025-06-30 PROCEDURE — 38792 RA TRACER ID OF SENTINL NODE: CPT | Mod: TC

## 2025-06-30 PROCEDURE — 71000033 HC RECOVERY, INTIAL HOUR: Performed by: SURGERY

## 2025-06-30 PROCEDURE — 63600175 PHARM REV CODE 636 W HCPCS: Mod: PO | Performed by: RADIOLOGY

## 2025-06-30 PROCEDURE — C1769 GUIDE WIRE: HCPCS | Mod: PO

## 2025-06-30 PROCEDURE — 25000003 PHARM REV CODE 250: Performed by: ANESTHESIOLOGY

## 2025-06-30 PROCEDURE — 76098 X-RAY EXAM SURGICAL SPECIMEN: CPT | Mod: TC

## 2025-06-30 RX ORDER — SODIUM CHLORIDE 9 MG/ML
INJECTION, SOLUTION INTRAVENOUS CONTINUOUS
Status: DISCONTINUED | OUTPATIENT
Start: 2025-06-30 | End: 2025-06-30 | Stop reason: HOSPADM

## 2025-06-30 RX ORDER — ACETAMINOPHEN 10 MG/ML
INJECTION, SOLUTION INTRAVENOUS
Status: DISCONTINUED | OUTPATIENT
Start: 2025-06-30 | End: 2025-06-30

## 2025-06-30 RX ORDER — GLUCAGON 1 MG
1 KIT INJECTION
Status: DISCONTINUED | OUTPATIENT
Start: 2025-06-30 | End: 2025-06-30 | Stop reason: HOSPADM

## 2025-06-30 RX ORDER — HYDROMORPHONE HYDROCHLORIDE 1 MG/ML
0.2 INJECTION, SOLUTION INTRAMUSCULAR; INTRAVENOUS; SUBCUTANEOUS EVERY 5 MIN PRN
Status: DISCONTINUED | OUTPATIENT
Start: 2025-06-30 | End: 2025-06-30 | Stop reason: HOSPADM

## 2025-06-30 RX ORDER — DIPHENHYDRAMINE HYDROCHLORIDE 50 MG/ML
12.5 INJECTION, SOLUTION INTRAMUSCULAR; INTRAVENOUS
Status: DISCONTINUED | OUTPATIENT
Start: 2025-06-30 | End: 2025-06-30 | Stop reason: HOSPADM

## 2025-06-30 RX ORDER — TECHNETIUM TC 99M SULFUR COLLOID 2 MG
1 KIT MISCELLANEOUS
Status: COMPLETED | OUTPATIENT
Start: 2025-06-30 | End: 2025-06-30

## 2025-06-30 RX ORDER — ONDANSETRON HYDROCHLORIDE 2 MG/ML
4 INJECTION, SOLUTION INTRAVENOUS DAILY PRN
Status: DISCONTINUED | OUTPATIENT
Start: 2025-06-30 | End: 2025-06-30 | Stop reason: HOSPADM

## 2025-06-30 RX ORDER — CEFAZOLIN 2 G/1
2 INJECTION, POWDER, FOR SOLUTION INTRAMUSCULAR; INTRAVENOUS
Status: COMPLETED | OUTPATIENT
Start: 2025-06-30 | End: 2025-06-30

## 2025-06-30 RX ORDER — ONDANSETRON HYDROCHLORIDE 2 MG/ML
INJECTION, SOLUTION INTRAMUSCULAR; INTRAVENOUS
Status: DISCONTINUED | OUTPATIENT
Start: 2025-06-30 | End: 2025-06-30

## 2025-06-30 RX ORDER — LIDOCAINE HYDROCHLORIDE 10 MG/ML
3 INJECTION, SOLUTION EPIDURAL; INFILTRATION; INTRACAUDAL; PERINEURAL ONCE
Status: COMPLETED | OUTPATIENT
Start: 2025-06-30 | End: 2025-06-30

## 2025-06-30 RX ORDER — HYDROCODONE BITARTRATE AND ACETAMINOPHEN 5; 325 MG/1; MG/1
1 TABLET ORAL EVERY 6 HOURS PRN
Qty: 20 TABLET | Refills: 0 | Status: SHIPPED | OUTPATIENT
Start: 2025-06-30

## 2025-06-30 RX ORDER — LIDOCAINE HYDROCHLORIDE 20 MG/ML
INJECTION, SOLUTION EPIDURAL; INFILTRATION; INTRACAUDAL; PERINEURAL
Status: DISCONTINUED | OUTPATIENT
Start: 2025-06-30 | End: 2025-06-30

## 2025-06-30 RX ORDER — PROPOFOL 10 MG/ML
VIAL (ML) INTRAVENOUS
Status: DISCONTINUED | OUTPATIENT
Start: 2025-06-30 | End: 2025-06-30

## 2025-06-30 RX ORDER — OXYCODONE HYDROCHLORIDE 5 MG/1
5 TABLET ORAL
Status: DISCONTINUED | OUTPATIENT
Start: 2025-06-30 | End: 2025-06-30 | Stop reason: HOSPADM

## 2025-06-30 RX ORDER — BUPIVACAINE HYDROCHLORIDE AND EPINEPHRINE 2.5; 5 MG/ML; UG/ML
INJECTION, SOLUTION EPIDURAL; INFILTRATION; INTRACAUDAL; PERINEURAL
Status: DISCONTINUED | OUTPATIENT
Start: 2025-06-30 | End: 2025-06-30 | Stop reason: HOSPADM

## 2025-06-30 RX ORDER — ROCURONIUM BROMIDE 10 MG/ML
INJECTION, SOLUTION INTRAVENOUS
Status: DISCONTINUED | OUTPATIENT
Start: 2025-06-30 | End: 2025-06-30

## 2025-06-30 RX ORDER — FENTANYL CITRATE 50 UG/ML
INJECTION, SOLUTION INTRAMUSCULAR; INTRAVENOUS
Status: DISCONTINUED | OUTPATIENT
Start: 2025-06-30 | End: 2025-06-30

## 2025-06-30 RX ADMIN — SODIUM CHLORIDE, SODIUM LACTATE, POTASSIUM CHLORIDE, AND CALCIUM CHLORIDE: .6; .31; .03; .02 INJECTION, SOLUTION INTRAVENOUS at 11:06

## 2025-06-30 RX ADMIN — ROCURONIUM BROMIDE 50 MG: 10 INJECTION, SOLUTION INTRAVENOUS at 11:06

## 2025-06-30 RX ADMIN — HYDROMORPHONE HYDROCHLORIDE 0.2 MG: 1 INJECTION, SOLUTION INTRAMUSCULAR; INTRAVENOUS; SUBCUTANEOUS at 01:06

## 2025-06-30 RX ADMIN — SUGAMMADEX 200 MG: 100 INJECTION, SOLUTION INTRAVENOUS at 12:06

## 2025-06-30 RX ADMIN — OXYCODONE HYDROCHLORIDE 5 MG: 5 TABLET ORAL at 01:06

## 2025-06-30 RX ADMIN — TECHNETIUM TC 99M SULFUR COLLOID KIT 1 MILLICURIE: KIT at 10:06

## 2025-06-30 RX ADMIN — ONDANSETRON 4 MG: 2 INJECTION INTRAMUSCULAR; INTRAVENOUS at 11:06

## 2025-06-30 RX ADMIN — ROCURONIUM BROMIDE 10 MG: 10 INJECTION, SOLUTION INTRAVENOUS at 11:06

## 2025-06-30 RX ADMIN — LIDOCAINE HYDROCHLORIDE 50 MG: 20 INJECTION, SOLUTION INTRAVENOUS at 11:06

## 2025-06-30 RX ADMIN — FENTANYL CITRATE 100 MCG: 0.05 INJECTION, SOLUTION INTRAMUSCULAR; INTRAVENOUS at 11:06

## 2025-06-30 RX ADMIN — ACETAMINOPHEN 1000 MG: 10 INJECTION, SOLUTION INTRAVENOUS at 11:06

## 2025-06-30 RX ADMIN — LIDOCAINE HYDROCHLORIDE 30 MG: 10 INJECTION, SOLUTION EPIDURAL; INFILTRATION; INTRACAUDAL; PERINEURAL at 10:06

## 2025-06-30 RX ADMIN — PROPOFOL 100 MG: 10 INJECTION, EMULSION INTRAVENOUS at 11:06

## 2025-06-30 RX ADMIN — CEFAZOLIN 2 G: 2 INJECTION, POWDER, FOR SOLUTION INTRAMUSCULAR; INTRAVENOUS at 11:06

## 2025-06-30 NOTE — DISCHARGE SUMMARY
[facility]  Discharge Note  Short Stay    Procedure(s) (LRB):  EXCISION, LESION, BREAST, WITH NEEDLE LOCALIZATION (Right)  BIOPSY, SENTINEL LYMPH NODE (Right)  INJECTION, FOR SENTINEL NODE IDENTIFICATION (Right)  NEEDLE LOCALIZATION (Right)      OUTCOME: Patient tolerated treatment/procedure well without complication and is now ready for discharge.    DISPOSITION: Home or Self Care    FINAL DIAGNOSIS:  <principal problem not specified>    FOLLOWUP: In clinic    DISCHARGE INSTRUCTIONS:    Discharge Procedure Orders   Diet Adult Regular     Ice to affected area     Lifting restrictions   Order Comments: Please avoid lifting greater than 40 lb, straining, strenuous activity for two weeks.     Change dressing (specify)   Order Comments: Post-Operative Wound Care    A surgical glue has been placed over your incisions, please leave the glue in place and do not attempt to remove it.  It is ok to shower using mild soap and water over the incisions the day after your procedure. Pat dry your incisions. Do not soak in a bathtub or other body of water for 2 weeks or until cleared by your surgeon.     If you noticed redness, swelling, fever, increasing pain or significant drainage from your wound please call/message the office or the 24 hr nurse hotline after hours.     Notify your health care provider if you experience any of the following:  temperature >100.4     Notify your health care provider if you experience any of the following:  persistent nausea and vomiting or diarrhea     Notify your health care provider if you experience any of the following:  severe uncontrolled pain     Notify your health care provider if you experience any of the following:  redness, tenderness, or signs of infection (pain, swelling, redness, odor or green/yellow discharge around incision site)     Notify your health care provider if you experience any of the following:  worsening rash     Notify your health care provider if you  experience any of the following:  increased confusion or weakness     Shower on day dressing removed (No bath)        TIME SPENT ON DISCHARGE: 10 minutes

## 2025-06-30 NOTE — ANESTHESIA PROCEDURE NOTES
Intubation    Date/Time: 6/30/2025 11:22 AM    Performed by: Lyn Baker  Authorized by: Joao Weeks MD    Intubation:     Induction:  Intravenous    Intubated:  Postinduction    Mask Ventilation:  Easy mask    Attempts:  1    Attempted By:  CRNA    Method of Intubation:  Video laryngoscopy    Blade:  Bassett 3    Laryngeal View Grade: Grade I - full view of cords      Difficult Airway Encountered?: No      Complications:  None    Airway Device:  Oral endotracheal tube    Airway Device Size:  7.0    Style/Cuff Inflation:  Cuffed    Inflation Amount (mL):  5    Tube secured:  22    Secured at:  The teeth    Placement Verified By:  Capnometry and Revisualization with laryngoscopy    Complicating Factors:  None    Findings Post-Intubation:  BS equal bilateral and atraumatic/condition of teeth unchanged

## 2025-06-30 NOTE — TRANSFER OF CARE
"Anesthesia Transfer of Care Note    Patient: Essence Herrera    Procedure(s) Performed: Procedure(s) (LRB):  EXCISION, LESION, BREAST, WITH NEEDLE LOCALIZATION (Right)  BIOPSY, SENTINEL LYMPH NODE (Right)  INJECTION, FOR SENTINEL NODE IDENTIFICATION (Right)  NEEDLE LOCALIZATION (Right)    Patient location: PACU    Anesthesia Type: general    Transport from OR: Transported from OR on room air with adequate spontaneous ventilation    Post pain: adequate analgesia    Post assessment: no apparent anesthetic complications and tolerated procedure well    Post vital signs: stable    Level of consciousness: responds to stimulation    Nausea/Vomiting: no nausea/vomiting    Complications: none    Transfer of care protocol was followed      Last vitals: Visit Vitals  /60 (BP Location: Right arm, Patient Position: Lying)   Pulse (!) 58   Temp 36.6 °C (97.9 °F) (Oral)   Resp 16   Ht 5' 5" (1.651 m)   Wt 83.9 kg (185 lb)   SpO2 100%   Breastfeeding No   BMI 30.79 kg/m²     "

## 2025-06-30 NOTE — H&P
Seen and examined.  A recent admission for DVT with saddle PE status post thrombectomy.  Was on anticoagulation but held for procedure. Also treated for UTI. Planning to proceed with right breast partial mastectomy and sentinel lymph node biopsy of the axilla.    Juaquin Sanchez Jr    GENERAL SURGERY  OUTPATIENT H&P     REASON FOR VISIT/CC: Right breast cancer     HPI: Essence Herrera is a 75 y.o. 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  A port was placed on 12/16/2024 and she began neoadjuvant therapy.  Just completed last round of Taxol.  Having significant neuropathy and fatigue. Now planning to proceed with surgical portion of her treatment. The patient is interested in the least invasive surgery with the least amount of downtime.  She does have a trip planned for [place] in the next 2 weeks.     I have reviewed the patient's chart including prior progress notes, procedures and testing.      ROS:   Review of Systems     PROBLEM LIST:  [Problem List]    [Problem List]  Patient Active Problem List      Diagnosis    Pneumonia due to COVID-19 virus    Essential hypertension    Hyperlipidemia    Acute respiratory failure with hypoxia    Severe obesity (BMI 35.0-39.9) with comorbidity    Malignant neoplasm of upper-inner quadrant of right breast in female, estrogen receptor negative           HISTORY       Past Medical History:   Diagnosis Date    Hyperlipidemia      Hypertension                 Past Surgical History:   Procedure Laterality Date    BUNIONECTOMY Bilateral 1996    CARPAL TUNNEL RELEASE Bilateral 1998    DILATION AND CURETTAGE OF UTERUS        HYSTERECTOMY         TVH with conservation of the ovaries    INSERTION OF TUNNELED CENTRAL VENOUS CATHETER (CVC) WITH SUBCUTANEOUS PORT Right 12/16/2024     Procedure: [id number];  Surgeon: Juaquin Sanchez  MD Ida;  Location: [facility] OR;  Service: General;  Laterality: Right;    ROTATOR CUFF REPAIR Right 2005    TUBAL LIGATION             [Social History]    [Social History]       Tobacco Use    Smoking status: Never    Smokeless tobacco: Never   Substance Use Topics    Alcohol use: Not Currently    Drug use: Never               Family History   Problem Relation Name Age of Onset    Breast cancer Maternal Grandfather        Breast cancer Maternal Aunt                MEDS:  [Medications Ordered Prior to Encounter]    [Medications Ordered Prior to Encounter]         Current Outpatient Medications on File Prior to Visit   Medication Sig Dispense Refill    dexAMETHasone (DECADRON) 4 MG Tab Take 2 tablets (8 mg total) by mouth As instructed. Take 8 mg by mouth daily on days 2, 3 and 4 of chemotherapy cycle. 24 tablet 3    diphenhydrAMINE-aluminum-magnesium hydroxide-simethicone-LIDOcaine viscous HCl 2% Swish and spit 15 mLs every 4 (four) hours as needed. 1 each 2    gabapentin (NEURONTIN) 100 MG capsule Take 1 capsule (100 mg total) by mouth 3 (three) times daily. 90 capsule 2    LIDOcaine-prilocaine (EMLA) cream Apply topically as needed (Apply to port site 30 minutes before access as needed). 30 g 0    magic mouthwash diphen/antac/lidoc Swish and spit 15 mLs every 4 (four) hours as needed. 450 mL 2    MOUNJARO 2.5 mg/0.5 mL PnIj Inject 2.5 mg into the skin once a week.        OLANZapine (ZYPREXA) 5 MG tablet Take 1 tablet (5 mg total) by mouth As instructed. Take 1 tablet by mouth nightly on days 1-4 of each chemotherapy cycle. 30 tablet 0    olmesartan-hydrochlorothiazide (BENICAR HCT) 20-12.5 mg per tablet Take 1 tablet by mouth once daily.        ondansetron (ZOFRAN-ODT) 8 MG TbDL Take 1 tablet (8 mg total) by mouth every 8 (eight) hours as needed (Nausea). 60 tablet 5    prochlorperazine (COMPAZINE) 10 MG tablet Take 1 tablet (10 mg total) by mouth every 6 (six) hours as needed. 30 tablet 1    traMADoL (ULTRAM) 50 mg  tablet Take 1 tablet (50 mg total) by mouth every 6 (six) hours as needed for Pain. 30 tablet 0    traZODone (DESYREL) 50 MG tablet Take 1 tablet (50 mg total) by mouth every evening. 30 tablet 11    atorvastatin (LIPITOR) 20 MG tablet Take 20 mg by mouth once daily. (Patient not taking: Reported on 5/21/2025)          No current facility-administered medications on file prior to visit.        ALLERGIES:  Review of patient's allergies indicates:  No Known Allergies        VITALS:      Vitals:     05/21/25 0909   BP: (!) 89/62   Pulse: 105   Temp: 97.7 °F (36.5 °C)            PHYSICAL EXAM:  Physical Exam  Vitals reviewed.   Constitutional:       General: She is not in acute distress.     Appearance: Normal appearance. She is well-developed.   HENT:      Head: Normocephalic and atraumatic.      Nose: Nose normal.   Eyes:      General: No scleral icterus.     Conjunctiva/sclera: Conjunctivae normal.   Neck:      Trachea: No tracheal tenderness or tracheal deviation.   Cardiovascular:      Rate and Rhythm: Normal rate and regular rhythm.      Pulses: Normal pulses.   Pulmonary:      Effort: Pulmonary effort is normal. No accessory muscle usage or respiratory distress.      Breath sounds: Normal breath sounds.   Abdominal:      General: There is no distension.      Palpations: Abdomen is soft.      Tenderness: There is no abdominal tenderness.   Musculoskeletal:         General: No swelling or tenderness. Normal range of motion.      Cervical back: Normal range of motion and neck supple. No rigidity.   Skin:     General: Skin is warm and dry.      Coloration: Skin is not jaundiced.      Findings: No erythema.   Neurological:      General: No focal deficit present.      Mental Status: She is alert and oriented to person, place, and time.      Motor: Weakness present. No abnormal muscle tone.   Psychiatric:         Mood and Affect: Mood normal.         Behavior: Behavior normal.         Thought Content: Thought content  normal.         Judgment: Judgment normal.               LABS:        Lab Results   Component Value Date     WBC 17.8 (H) 05/05/2025     WBC 26.77 (H) 04/01/2025     RBC 3.40 (L) 05/05/2025     RBC 3.27 (L) 04/01/2025     HGB 10.9 (L) 05/05/2025     HGB 10.1 (L) 04/01/2025     HCT 33.1 (L) 05/05/2025     HCT 30.2 (L) 04/01/2025      05/05/2025      04/01/2025            Lab Results   Component Value Date      (H) 05/05/2025      04/01/2025      (L) 05/05/2025      (L) 04/01/2025     K 4.7 05/05/2025     K 3.6 04/01/2025     CL 97 05/05/2025      04/01/2025     CO2 19 (L) 05/05/2025     CO2 23 04/01/2025     BUN 11 05/05/2025     BUN 17 04/01/2025     CREATININE 0.96 05/05/2025     CREATININE 0.9 04/01/2025     CALCIUM 9.3 05/05/2025     CALCIUM 9.2 04/01/2025            Lab Results   Component Value Date     ALT 16 05/05/2025     AST 26 05/05/2025     ALKPHOS 88 12/12/2024     BILITOT 0.5 05/05/2025            Lab Results   Component Value Date     MG 2.1 05/05/2025     PHOS 3.8 03/02/2021         STUDIES:  Images and reports were personally reviewed.  CTA from 04/01/2025 reviewed, portions of the right breast were visualized, biopsy marker was noted        ASSESSMENT & PLAN:  75 y.o. 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 this has now completed ACT neoadjuvant therapy  -the patient has completed neoadjuvant therapy and now planning to proceed with surgical intervention   -discussed options including mastectomy sentinel lymph node biopsy versus lumpectomy with sentinel lymph node biopsy followed by radiation, the patient is interested in the breast conservation therapy   -risks of the procedure for both the lumpectomy/partial mastectomy and axillary sentinel lymph node biopsy were reviewed, these include pain, bleeding, scarring, infection, seroma, hematoma, injury to nerves or vessels, need for further intervention, etc., patient expressed  understanding these risks   -will tentatively plan for surgery on 06/12/2025 at [facility] main   -will follow up on repeat MRI of the breast to assure this has no contraindications to proceeding with BCT, this has scheduled for this Friday

## 2025-06-30 NOTE — DISCHARGE INSTRUCTIONS
For the next 24 hours:  Don't drink any alcohol  Don't drive  Don't sign any legal documents  Don't take a shower

## 2025-06-30 NOTE — ANESTHESIA PREPROCEDURE EVALUATION
06/30/2025  Essence Herrera is a 75 y.o., female.      Results for orders placed or performed during the hospital encounter of 05/22/25   EKG 12-lead    Collection Time: 06/25/25  3:30 PM   Result Value Ref Range    QRS Duration 84 ms    [facility] QTC Calculation 462 ms    Narrative    Test Reason :    Vent. Rate :  72 BPM     Atrial Rate :  72 BPM     P-R Int : 134 ms          QRS Dur :  84 ms      QT Int : 422 ms       P-R-T Axes :  48  14  59 degrees    QTcB Int : 462 ms    Normal sinus rhythm  Normal ECG  When compared with ECG of 22-May-2025 15:27,  Vent. rate has decreased by  40 bpm  T wave inversion no longer evident in Inferior leads  Nonspecific T wave abnormality no longer evident in Anterior-lateral leads  Confirmed by Jaime Call ([id number]) on 6/26/2025 8:02:06 PM    Referred By: [email or web address] SELF           Confirmed By: Jaime Call             Lab Results   Component Value Date    WBC 4.23 06/26/2025    HGB 12.0 06/26/2025    HCT 37.5 06/26/2025    MCV 97 06/26/2025     06/26/2025     BMP  Lab Results   Component Value Date     (L) 06/26/2025    K 3.8 06/26/2025    CL 96 06/26/2025    CO2 23 06/26/2025    BUN 25 (H) 06/26/2025    CREATININE 1.0 06/26/2025    CALCIUM 9.3 06/26/2025    ANIONGAP 11 06/26/2025     (H) 06/26/2025    GLU 99 06/25/2025    GLU 94 05/26/2025       Results for orders placed during the hospital encounter of 12/10/24    Echo    Interpretation Summary    Left Ventricle: The left ventricle is normal in size. Normal wall thickness. There is normal systolic function with a visually estimated ejection fraction of 55 - 60%. Global longitudinal strain is --21.0%. Grade I diastolic dysfunction. E/A ratio is 0.76.    Right Ventricle: Normal right ventricular cavity size. Wall thickness is normal. Systolic function is normal.    Tricuspid Valve: There is mild  regurgitation. The estimated PA systolic pressure is at least 16 mmHg.    Pulmonic Valve: Not well visualized due to poor acoustic window.    IVC/SVC: Normal venous pressure at 3 mmHg.         Pre-op Assessment    I have reviewed the Patient Summary Reports.     I have reviewed the Nursing Notes. I have reviewed the NPO Status.   I have reviewed the Medications.     Review of Systems  Anesthesia Hx:  No problems with previous Anesthesia             Denies Family Hx of Anesthesia complications.    Denies Personal Hx of Anesthesia complications.                    Social:  No Alcohol Use, Non-Smoker       Hematology/Oncology:  Hematology Normal                  Hematology Comments: Hx of DVT May 2025    On Xarelto - last dose Wednesday  On Lovenox bridge - last dose Saturday    Current/Recent Cancer.  Breast    right   chemotherapy   Oncology Comments: S/p chemo     EENT/Dental:  EENT/Dental Normal           Cardiovascular:     Hypertension, well controlled           hyperlipidemia   ECG has been reviewed.                            Pulmonary:  Pneumonia       Remote history of Acute respiratory failure with hypoxia               Renal/:  Renal/ Normal                 Hepatic/GI:        Mounjaro Therapy with last dose 2 weeks ago  Taking GLP-1 Agonists Instructed to Hold for 7 Days           Musculoskeletal:  Arthritis               Neurological:        Peripheral Neuropathy (bilat feet and hands)                          Endocrine:  Endocrine Normal          Obesity / BMI > 30  Dermatological:  Skin Normal    Psych:  Psychiatric Normal                    Physical Exam  General: Well nourished, Cooperative, Alert and Oriented    Airway:  Mallampati: III / II  Mouth Opening: Normal  TM Distance: > 6 cm  Tongue: Normal  Neck ROM: Normal ROM    Dental:  Intact    Chest/Lungs:  Clear to auscultation, Normal Respiratory Rate    Heart:  Rate: Normal  Rhythm: Regular Rhythm        Anesthesia Plan  Type of Anesthesia,  risks & benefits discussed:    Anesthesia Type: Gen Supraglottic Airway  Intra-op Monitoring Plan: Standard ASA Monitors  Post Op Pain Control Plan: multimodal analgesia and IV/PO Opioids PRN  Induction:  IV  Informed Consent: Informed consent signed with the Patient and all parties understand the risks and agree with anesthesia plan.  All questions answered.   ASA Score: 3  Anesthesia Plan Notes: General LMA   Benadryl 6.25mg iv, Decadron 8mg, iv Zofran 4mg iv, Pepcid 20mg iv   Ofirmev 1000mg iv      Ready For Surgery From Anesthesia Perspective.     .

## 2025-06-30 NOTE — ANESTHESIA POSTPROCEDURE EVALUATION
Anesthesia Post Evaluation    Patient: Essence Herrera    Procedure(s) Performed: Procedure(s) (LRB):  EXCISION, LESION, BREAST, WITH NEEDLE LOCALIZATION (Right)  BIOPSY, SENTINEL LYMPH NODE (Right)  INJECTION, FOR SENTINEL NODE IDENTIFICATION (Right)  NEEDLE LOCALIZATION (Right)    Final Anesthesia Type: general      Patient location during evaluation: PACU  Patient participation: Yes- Able to Participate  Level of consciousness: awake and alert  Post-procedure vital signs: reviewed and stable  Pain management: adequate  Airway patency: patent    PONV status at discharge: No PONV  Anesthetic complications: no      Cardiovascular status: stable  Respiratory status: unassisted and spontaneous ventilation  Hydration status: euvolemic  Follow-up not needed.              Vitals Value Taken Time   /56 06/30/25 13:30   Temp 36.6 °C (97.9 °F) 06/30/25 13:15   Pulse 59 06/30/25 13:40   Resp 10 06/30/25 13:40   SpO2 100 % 06/30/25 13:40   Vitals shown include unfiled device data.      No case tracking events are documented in the log.      Pain/Tea Score: Pain Rating Prior to Med Admin: 6 (6/30/2025  1:30 PM)  Tea Score: 10 (6/30/2025  1:30 PM)

## 2025-07-08 ENCOUNTER — TELEPHONE (OUTPATIENT)
Dept: HEMATOLOGY/ONCOLOGY | Facility: CLINIC | Age: 75
End: 2025-07-08
Payer: MEDICARE

## 2025-07-09 ENCOUNTER — TELEPHONE (OUTPATIENT)
Dept: HEMATOLOGY/ONCOLOGY | Facility: CLINIC | Age: 75
End: 2025-07-09
Payer: MEDICARE

## 2025-07-14 ENCOUNTER — OFFICE VISIT (OUTPATIENT)
Dept: SURGERY | Facility: CLINIC | Age: 75
End: 2025-07-14
Payer: MEDICARE

## 2025-07-14 VITALS — DIASTOLIC BLOOD PRESSURE: 88 MMHG | HEART RATE: 76 BPM | SYSTOLIC BLOOD PRESSURE: 118 MMHG | TEMPERATURE: 98 F

## 2025-07-14 DIAGNOSIS — Z17.1 MALIGNANT NEOPLASM OF UPPER-INNER QUADRANT OF RIGHT BREAST IN FEMALE, ESTROGEN RECEPTOR NEGATIVE: Primary | ICD-10-CM

## 2025-07-14 DIAGNOSIS — C50.211 MALIGNANT NEOPLASM OF UPPER-INNER QUADRANT OF RIGHT BREAST IN FEMALE, ESTROGEN RECEPTOR NEGATIVE: Primary | ICD-10-CM

## 2025-07-14 PROCEDURE — 3079F DIAST BP 80-89 MM HG: CPT | Mod: CPTII,S$GLB,, | Performed by: SURGERY

## 2025-07-14 PROCEDURE — 99024 POSTOP FOLLOW-UP VISIT: CPT | Mod: S$GLB,,, | Performed by: SURGERY

## 2025-07-14 PROCEDURE — 1159F MED LIST DOCD IN RCRD: CPT | Mod: CPTII,S$GLB,, | Performed by: SURGERY

## 2025-07-14 PROCEDURE — 99999 PR PBB SHADOW E&M-EST. PATIENT-LVL III: CPT | Mod: PBBFAC,,, | Performed by: SURGERY

## 2025-07-14 PROCEDURE — 3074F SYST BP LT 130 MM HG: CPT | Mod: CPTII,S$GLB,, | Performed by: SURGERY

## 2025-07-14 PROCEDURE — 1126F AMNT PAIN NOTED NONE PRSNT: CPT | Mod: CPTII,S$GLB,, | Performed by: SURGERY

## 2025-07-14 NOTE — PROGRESS NOTES
GENERAL SURGERY  POST-OP PROGRESS NOTE    HPI: Essence Herrera is a 75 y.o. 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 Overall patient is doing well from surgery.  Still with significant neuropathy secondary to chemotherapy side effects. Denies drainage, redness or fevers.    VITALS:  Vitals:    07/14/25 1052   BP: 118/88   Pulse: 76   Temp: 97.5 °F (36.4 °C)       PHYSICAL EXAM:  Right breast incision intact without signs of infection, breakdown or other significant abnormality    Right axillary incision intact without signs of infection, breakdown or other abnormality    PATHOLOGY:  1. RIGHT BREAST, MASS, EXCISION:     --INVASIVE DUCTAL CARCINOMA.     --ATYPICAL DUCTAL HYPERPLASIA.     --BIOPSY SITE CHANGES.     --SEE SYNOPTIC REPORT.     2. SENTINEL LYMPH NODE, RIGHT AXILLA, EXCISIONAL BIOPSY: --TWO LYMPH    NODES, NEGATIVE FOR CARCINOMA (0/2).     Comment: CK7 immunohistochemical stain is performed with appropriate    controls on blocks 2A and 2B to assess for metastatic carcinoma, and is    negative.     CANCER CASE SUMMARY: INVASIVE CARCINOMA OF THE BREAST: RESECTION     SPECIMEN:   SPECIMEN TYPE AND PROCEDURE: Excision/lumpectomy, sentinel lymph node    biopsy   SPECIMEN LATERALITY: Right   TUMOR SITE: 1:00, 4 cm from nipple   SPECIMEN INTEGRITY AND SIZE: Intact; 5.7 x 4.3 x 3.5 cm   CLINICAL HISTORY: Invasive carcinoma of no special type (ductal)     TUMOR:   HISTOLOGIC TYPE: Invasive carcinoma of no special type (ductal)   COMBINED HISTOLOGIC stGstRstAstDstEst:st st1st SHANDRA HISTOLOGIC SCORE: 6   (TUBULE FORMATION, NUCLEAR PLEOMORPHISM, MITOTIC COUNT) 3, 2, 1   SIZE OF INVASIVE COMPONENT: 11 mm   TUMOR FOCALITY, NUMBER, AND SIZES OF FOCI: Single focus of invasive    carcinoma   TUMOR NECROSIS: Not identified   LYMPHATIC AND/OR  VASCULAR INVASION: Not identified   TUMOR INFILTRATING LYMPHOCYTES (%): Not identified   PERINEURAL INVASION: Not identified   SIZE, TYPE, EXTENT OF IN SITU COMPONENT: Negative for DCIS   MICROCALCIFICATIONS: Present in invasive carcinoma   SKIN/NIPPLE INVOLVEMENT: Not applicable (no skin/nipple present)   SKELETAL MUSCLE INVOLVEMENT: Not applicable (no skeletal muscle present)   SURGICAL MARGINS (SPECIFY MARGINS):   INVASIVE: All margins negative for invasive carcinoma   DISTANCE TO CLOSEST MARGIN: 4 mm--superior   REGIONAL LYMPH NODE STATUS: All regional lymph nodes negative for    metastatic carcinoma   TOTAL NUMBER OF LYMPH NODES EXAMINED: 2   NUMBER OF SENTINEL NODES EXAMINED: 2   METHOD OF EVALUATION: H and amp; E histology and immunohistochemistry   AXILLARY DISSECTION: 0   INTRAMAMMARY NODES: 0   NUMBER OF NODES WITH MACROMETASTASIS: 0   NUMBER OF NODES WITH MICROMETASTASIS: 0   NUMBER OF NODES WITH ITC'S: 0   SIZE OF LARGEST METASTATIC DEPOSIT: Not applicable   EXTRANODAL EXTENSION: Not applicable   DISTANT SITES INVOLVED, IF APPLICABLE: Not applicable   TREATMENT EFFECT:   BREAST: Probable response to presurgical therapy in the invasive    carcinoma   LYMPH NODES: No lymph node metastases and no fibrous scarring or    histiocytic aggregates in the nodes   RESIDUAL CANCER BURDEN: 1.249   RESIDUAL CANCER BURDEN CLASS: RCB-I   PATHOLOGIC STAGE CLASSIFICATION (pTNM, AJCC): ypT1c ypN0(sn)     ANCILLARY STUDIES:     BREAST PROGNOSTIC MARKER TESTING:     ER: Negative     VT: Negative     HER-2: Negative     ASSESSMENT & PLAN:  75 y.o. female s/p right breast triple negative invasive ductal carcinoma  -final pathology after neoadjuvant therapy ypT1c ypN0(sn)   -healing well from surgery  -has follow up with Oncology tomorrow  -will make sure the patient that has follow up with Radiation Oncology  -return to my clinic in 2 months after radiation therapy       63 year old female patient with pertinent history of recent small cell lung cancer diagnosis who recently started outpatient chemo(October 1st to 3rd) followed by Immunotherapy( on October 5th) presented to the ED complaining of progressively weakness. Patient has been eating and hydrating  but not enough. Patient denies any fever, chills, coughing, vomiting, diarrhea or abdominal pain but endorses being nauseous. Patient also noted some right chest wall pain and skin opening at the sight of port insertion for chemo- was initially scheduled for an outpatient revision with Interventional Radiology( Dr Mckeon) In the ED patient found to have a WBC of 32, Lactate of 5.6, Cr of 4. CT chest/Abdomen and Pelvis negative for any acute pathology, Cr noted to be 4, elevated ALP/AST/ALT, afebrile, was given empiric antibiotics with vancomycin/cefepime. No reported abd pain/diarrhea/cough/dysuria/rashes.      1. Mediport site infection s/p port site wound dehiscence s/p removal. leukocytosis. Small cell lung cancer s/p chemo/immunotherapy. CRF stage 3-4.   - port removed by IR 10/14  -on cefepime IV # 6  -given vancomycin 1250 mg IV x 1 on 10/13 and again on 10/16  -tolerating abx well so far; no side effects noted  -continue abx coverage   -mediport site is improving as expected   -f/u culture  - leukocytosis likely related to recently neulasta given 10/9 - improving  - CT chest abd/pelvis reviewed, no infectious focus  - transamnitis. thrombocytopenia. PAULA likely related to mets and chemo/immunotherapy  -monitor renal function closely  - blood cx/urine cx no growth  - monitor temps  - tolerating abx well so far; no side effects noted  - oncology f/u noted   - supportive care  - fu cbc

## 2025-07-15 ENCOUNTER — OFFICE VISIT (OUTPATIENT)
Dept: HEMATOLOGY/ONCOLOGY | Facility: CLINIC | Age: 75
End: 2025-07-15
Payer: MEDICARE

## 2025-07-15 ENCOUNTER — PATIENT MESSAGE (OUTPATIENT)
Dept: HEMATOLOGY/ONCOLOGY | Facility: CLINIC | Age: 75
End: 2025-07-15

## 2025-07-15 VITALS
TEMPERATURE: 98 F | SYSTOLIC BLOOD PRESSURE: 119 MMHG | BODY MASS INDEX: 31.74 KG/M2 | DIASTOLIC BLOOD PRESSURE: 70 MMHG | RESPIRATION RATE: 18 BRPM | HEIGHT: 65 IN | HEART RATE: 70 BPM | WEIGHT: 190.5 LBS | OXYGEN SATURATION: 100 %

## 2025-07-15 DIAGNOSIS — G62.9 NEUROPATHY: ICD-10-CM

## 2025-07-15 DIAGNOSIS — Z17.1 MALIGNANT NEOPLASM OF UPPER-INNER QUADRANT OF RIGHT BREAST IN FEMALE, ESTROGEN RECEPTOR NEGATIVE: Primary | ICD-10-CM

## 2025-07-15 DIAGNOSIS — G89.29 CHRONIC MIDLINE LOW BACK PAIN WITHOUT SCIATICA: ICD-10-CM

## 2025-07-15 DIAGNOSIS — M54.50 CHRONIC MIDLINE LOW BACK PAIN WITHOUT SCIATICA: ICD-10-CM

## 2025-07-15 DIAGNOSIS — I10 ESSENTIAL HYPERTENSION: ICD-10-CM

## 2025-07-15 DIAGNOSIS — E78.00 PURE HYPERCHOLESTEROLEMIA: ICD-10-CM

## 2025-07-15 DIAGNOSIS — C50.211 MALIGNANT NEOPLASM OF UPPER-INNER QUADRANT OF RIGHT BREAST IN FEMALE, ESTROGEN RECEPTOR NEGATIVE: Primary | ICD-10-CM

## 2025-07-15 DIAGNOSIS — K12.30 MUCOSITIS: ICD-10-CM

## 2025-07-15 PROCEDURE — 3078F DIAST BP <80 MM HG: CPT | Mod: CPTII,S$GLB,, | Performed by: INTERNAL MEDICINE

## 2025-07-15 PROCEDURE — 1159F MED LIST DOCD IN RCRD: CPT | Mod: CPTII,S$GLB,, | Performed by: INTERNAL MEDICINE

## 2025-07-15 PROCEDURE — 1101F PT FALLS ASSESS-DOCD LE1/YR: CPT | Mod: CPTII,S$GLB,, | Performed by: INTERNAL MEDICINE

## 2025-07-15 PROCEDURE — 3288F FALL RISK ASSESSMENT DOCD: CPT | Mod: CPTII,S$GLB,, | Performed by: INTERNAL MEDICINE

## 2025-07-15 PROCEDURE — 99215 OFFICE O/P EST HI 40 MIN: CPT | Mod: S$GLB,,, | Performed by: INTERNAL MEDICINE

## 2025-07-15 PROCEDURE — G2211 COMPLEX E/M VISIT ADD ON: HCPCS | Mod: S$GLB,,, | Performed by: INTERNAL MEDICINE

## 2025-07-15 PROCEDURE — 3074F SYST BP LT 130 MM HG: CPT | Mod: CPTII,S$GLB,, | Performed by: INTERNAL MEDICINE

## 2025-07-15 PROCEDURE — 99999 PR PBB SHADOW E&M-EST. PATIENT-LVL IV: CPT | Mod: PBBFAC,,, | Performed by: INTERNAL MEDICINE

## 2025-07-15 PROCEDURE — 1126F AMNT PAIN NOTED NONE PRSNT: CPT | Mod: CPTII,S$GLB,, | Performed by: INTERNAL MEDICINE

## 2025-07-15 RX ORDER — CAPECITABINE 500 MG/1
1000 TABLET, FILM COATED ORAL 2 TIMES DAILY
Qty: 112 TABLET | Refills: 7 | Status: ACTIVE | OUTPATIENT
Start: 2025-07-15 | End: 2026-07-15

## 2025-07-15 NOTE — PROGRESS NOTES
Service Date:  7/15/25    Chief Complaint:  Breast cancer     Essence Herrera is a 75 y.o. female here after lumpectomy following neoadjuvant ACT.  Here to discuss pathology results and further treatment plan.  Recovering from surgery.  Complaining of worsened neuropathy.  Gives her trouble ambulating.  Has to ambulate with a walker.  Feels that tramadol and gabapentin are only helping minimally.    Review of Systems   Constitutional:  Positive for fatigue. Negative for appetite change and unexpected weight change.   HENT:  Negative for mouth sores.    Eyes: Negative.  Negative for visual disturbance.   Respiratory: Negative.  Negative for cough and shortness of breath.    Cardiovascular: Negative.  Negative for chest pain.   Gastrointestinal: Negative.  Negative for abdominal pain and diarrhea.   Endocrine: Negative.    Genitourinary: Negative.  Negative for frequency.   Musculoskeletal: Negative.  Positive for leg pain. Negative for back pain.   Integumentary:  Negative for rash. Negative.   Neurological: Negative.  Negative for headaches.   Hematological: Negative.  Negative for adenopathy.   Psychiatric/Behavioral: Negative.  The patient is not nervous/anxious.         Current Outpatient Medications   Medication Instructions    acetaminophen (TYLENOL) 500 mg, Every 6 hours PRN    atorvastatin (LIPITOR) 10 mg, Daily    capecitabine (XELODA) 1,000 mg/m2, Oral, 2 times daily    enoxaparin (LOVENOX) 80 mg/0.8 mL Syrg Inject into the skin. Started 6/26/25    gabapentin (NEURONTIN) 100 mg, Oral, 3 times daily    HYDROcodone-acetaminophen (NORCO) 5-325 mg per tablet 1 tablet, Oral, Every 6 hours PRN    LIDOcaine-prilocaine (EMLA) cream Topical (Top), As needed (PRN)    MOUNJARO 5 mg, Every 7 days    [Paused] olmesartan-hydrochlorothiazide (BENICAR HCT) 20-12.5 mg per tablet 1 tablet, Daily    ondansetron (ZOFRAN-ODT) 8 mg, Oral, Every 8 hours PRN    prochlorperazine (COMPAZINE) 10 mg, Oral, Every 6 hours PRN     rivaroxaban (XARELTO) 20 mg, Oral, With dinner    zolpidem (AMBIEN) 10 mg, Nightly        Past Medical History:   Diagnosis Date    Hyperlipidemia     Hypertension     Neuropathy         Past Surgical History:   Procedure Laterality Date    BUNIONECTOMY Bilateral 1996    CARPAL TUNNEL RELEASE Bilateral 1998    DILATION AND CURETTAGE OF UTERUS      EXCISION, LESION, BREAST, WITH NEEDLE LOCALIZATION Right 6/30/2025    Procedure: EXCISION, LESION, BREAST, WITH NEEDLE LOCALIZATION;  Surgeon: Juaquin Sanchez Jr., MD;  Location: [facility] OR;  Service: General;  Laterality: Right;    HYSTERECTOMY      TVH with conservation of the ovaries    INJECTION FOR SENTINEL NODE IDENTIFICATION Right 6/30/2025    Procedure: INJECTION, FOR SENTINEL NODE IDENTIFICATION;  Surgeon: Juaquin Sanchez Jr., MD;  Location: [facility] OR;  Service: General;  Laterality: Right;  SENTINEL INJ @ 9A    INSERTION OF TUNNELED CENTRAL VENOUS CATHETER (CVC) WITH SUBCUTANEOUS PORT Right 12/16/2024    Procedure: [id number];  Surgeon: Juaquin Sanchez Jr., MD;  Location: [facility] OR;  Service: General;  Laterality: Right;    NEEDLE LOCALIZATION Right 6/30/2025    Procedure: NEEDLE LOCALIZATION;  Surgeon: Juaquin Sanchez Jr., MD;  Location: [facility] OR;  Service: General;  Laterality: Right;  MAMMO @ 8A    PHLEBOGRAPHY N/A 5/23/2025    Procedure: Venogram;  Surgeon: Khoobehi, Ali, MD;  Location: [facility] CATH/EP LAB;  Service: Vascular;  Laterality: N/A;    ROTATOR CUFF REPAIR Right 2005    SENTINEL LYMPH NODE BIOPSY Right 6/30/2025    Procedure: BIOPSY, SENTINEL LYMPH NODE;  Surgeon: Juaquin Sanchez Jr., MD;  Location: [facility] OR;  Service: General;  Laterality: Right;    TUBAL LIGATION          Family History   Problem Relation Name Age of Onset    Breast cancer Maternal Grandfather      Breast cancer Maternal Aunt         Social History     Tobacco Use    Smoking status: Never    Smokeless tobacco: Never   Vaping Use    Vaping status:  "Never Used   Substance Use Topics    Alcohol use: Not Currently    Drug use: Never         Vitals:    07/15/25 1038   BP: 119/70   Pulse: 70   Resp: 18   Temp: 97.6 °F (36.4 °C)        Physical Exam:  /70 (BP Location: Left arm, Patient Position: Sitting)   Pulse 70   Temp 97.6 °F (36.4 °C) (Temporal)   Resp 18   Ht 5' 5" (1.651 m)   Wt 86.4 kg (190 lb 7.6 oz)   SpO2 100%   BMI 31.70 kg/m²     Physical Exam  Constitutional:       Appearance: Normal appearance.   HENT:      Head: Normocephalic and atraumatic.      Nose: Nose normal.      Mouth/Throat:      Mouth: Mucous membranes are moist.      Pharynx: Oropharynx is clear.   Eyes:      Conjunctiva/sclera: Conjunctivae normal.   Cardiovascular:      Rate and Rhythm: Normal rate and regular rhythm.      Heart sounds: Normal heart sounds.   Pulmonary:      Effort: Pulmonary effort is normal.      Breath sounds: Normal breath sounds.   Abdominal:      General: Abdomen is flat. Bowel sounds are normal.      Palpations: Abdomen is soft.   Musculoskeletal:         General: Normal range of motion.      Cervical back: Normal range of motion and neck supple.   Skin:     General: Skin is warm and dry.   Neurological:      General: No focal deficit present.      Mental Status: She is alert and oriented to person, place, and time. Mental status is at baseline.   Psychiatric:         Mood and Affect: Mood normal.          Labs:  Lab Results   Component Value Date    WBC 4.23 06/26/2025    RBC 3.87 (L) 06/26/2025    HGB 12.0 06/26/2025    HCT 37.5 06/26/2025    MCV 97 06/26/2025    MCH 31.0 06/26/2025    MCHC 32.0 06/26/2025    RDW 14.9 (H) 06/26/2025     06/26/2025    MPV 10.8 06/26/2025    GRAN 2.3 12/16/2024    GRAN 44.3 12/16/2024    LYMPH 0.7 05/05/2025    MONO 4 05/05/2025    MONO 0.7 05/05/2025    EOS 0.2 05/05/2025    BASO 0.2 05/05/2025    EOSINOPHIL 1 05/05/2025    BASOPHIL 1 05/05/2025     Sodium   Date Value Ref Range Status   06/30/2025 136 136 - " 145 mmol/L Final     Potassium   Date Value Ref Range Status   06/30/2025 3.9 3.5 - 5.1 mmol/L Final     Chloride   Date Value Ref Range Status   06/30/2025 101 95 - 110 mmol/L Final     CO2   Date Value Ref Range Status   06/30/2025 28 23 - 29 mmol/L Final     Glucose   Date Value Ref Range Status   06/30/2025 100 70 - 110 mg/dL Final     BUN   Date Value Ref Range Status   06/30/2025 10 8 - 23 mg/dL Final     Creatinine   Date Value Ref Range Status   06/30/2025 0.8 0.5 - 1.4 mg/dL Final     Calcium   Date Value Ref Range Status   06/30/2025 10.0 8.7 - 10.5 mg/dL Final     Protein Total   Date Value Ref Range Status   06/25/2025 6.9 6.0 - 8.4 gm/dL Final     Albumin   Date Value Ref Range Status   06/25/2025 3.6 3.5 - 5.2 g/dL Final     Bilirubin Total   Date Value Ref Range Status   06/25/2025 0.5 0.1 - 1.0 mg/dL Final     Comment:     For infants and newborns, interpretation of results should be based   on gestational age, weight and in agreement with clinical   observations.    Premature Infant recommended reference ranges:   0-24 hours:  <8.0 mg/dL   24-48 hours: <12.0 mg/dL   3-5 days:    <15.0 mg/dL   6-29 days:   <15.0 mg/dL     ALP   Date Value Ref Range Status   06/25/2025 80 55 - 135 unit/L Final     AST   Date Value Ref Range Status   06/25/2025 19 10 - 40 unit/L Final     ALT   Date Value Ref Range Status   06/25/2025 17 10 - 44 unit/L Final     Anion Gap   Date Value Ref Range Status   06/30/2025 7 (L) 8 - 16 mmol/L Final     eGFR if    Date Value Ref Range Status   03/02/2021 >60.0 >60 mL/min/1.73 m^2 Final     eGFR if non    Date Value Ref Range Status   03/02/2021 >60.0 >60 mL/min/1.73 m^2 Final     Comment:     Calculation used to obtain the estimated glomerular filtration  rate (eGFR) is the CKD-EPI equation.          A/P:    Malignant neoplasm of the right breast   -triple negative   -cT1c N0 M0   -completed neoadjuvant ACT   -had minimal cancer remaining on  pathology unfortunately after neoadjuvant treatment.  Due to the lack of a complete pathological response, we discussed the benefits of Xeloda with her and her family.  After much discussion she decided to move forward with this.  I will send in a prescription 0 SP and see her back about 3 weeks after starting.    BLE pain/neuropathy  -unfortunately this is getting worse for her, ever since completing Taxol.  I will refer to integrative Oncology.    Hypertension  -normally runs hypertensive   -stopped taking Benicar as she was getting hypotensive while also on chemotherapy    Aurash Khoobehi, MD  Hematology and Oncology    Visit today included increased complexity associated with the care of the episodic problem breast cancer addressed and managing the longitudinal care of the patient due to the serious and/or complex managed problem(s).

## 2025-07-16 ENCOUNTER — TELEPHONE (OUTPATIENT)
Dept: HEMATOLOGY/ONCOLOGY | Facility: CLINIC | Age: 75
End: 2025-07-16
Payer: MEDICARE

## 2025-07-21 ENCOUNTER — TELEPHONE (OUTPATIENT)
Dept: HEMATOLOGY/ONCOLOGY | Facility: CLINIC | Age: 75
End: 2025-07-21
Payer: MEDICARE

## 2025-07-21 ENCOUNTER — DOCUMENTATION ONLY (OUTPATIENT)
Facility: CLINIC | Age: 75
End: 2025-07-21
Payer: MEDICARE

## 2025-07-21 NOTE — PROGRESS NOTES
Pt completed surgery on 6/30. Will be starting xeloda soon. Message sent to Dr. Khoobehi staff in regards to scheduling chemo school and f/u with Dr. Khoobehi.

## 2025-07-22 ENCOUNTER — TELEPHONE (OUTPATIENT)
Dept: HEMATOLOGY/ONCOLOGY | Facility: CLINIC | Age: 75
End: 2025-07-22
Payer: MEDICARE

## 2025-07-22 NOTE — TELEPHONE ENCOUNTER
Spoke to pt to remind of virtual appt tomorrow with Prerna Flores NP. Pt verbalized understanding and confirmed appt Location was discussed.

## 2025-07-23 ENCOUNTER — TELEPHONE (OUTPATIENT)
Dept: HEMATOLOGY/ONCOLOGY | Facility: CLINIC | Age: 75
End: 2025-07-23
Payer: MEDICARE

## 2025-07-23 NOTE — TELEPHONE ENCOUNTER
Spoke to pt and r/s her virtual appt that was scheduled with Prerna Flores NP today. Patient had technical difficulties and r/s for an inperson appt. Patient verbalized understanding, confirmed new day/time and location. Pt thanked me for calling her.

## 2025-07-23 NOTE — TELEPHONE ENCOUNTER
Returned call to pt, she decided not to reschedule this appt, she was confused with another appt. Pt stated this appt was fine, she verbalized understanding, confirmed appt day/time and thanked me for calling.    Copied from CRM #[id number]. Topic: Appointments - Appointment Rescheduling  >> Jul 23, 2025  2:11 PM Prisca wrote:  Type: Needs Medical Advice  Who Called:  pt   Symptoms (please be specific):    How long has patient had these symptoms:    Pharmacy name and phone #:    Best Call Back Number: [phone]  Additional Information: pt is requesting a call back to reschedule appt on 7/31

## 2025-07-25 ENCOUNTER — TELEPHONE (OUTPATIENT)
Dept: HEMATOLOGY/ONCOLOGY | Facility: CLINIC | Age: 75
End: 2025-07-25

## 2025-07-25 ENCOUNTER — CLINICAL SUPPORT (OUTPATIENT)
Dept: HEMATOLOGY/ONCOLOGY | Facility: CLINIC | Age: 75
End: 2025-07-25
Payer: MEDICARE

## 2025-07-25 VITALS
TEMPERATURE: 97 F | RESPIRATION RATE: 18 BRPM | HEIGHT: 65 IN | OXYGEN SATURATION: 99 % | BODY MASS INDEX: 31.7 KG/M2 | HEART RATE: 68 BPM

## 2025-07-25 DIAGNOSIS — Z17.1 MALIGNANT NEOPLASM OF UPPER-INNER QUADRANT OF RIGHT BREAST IN FEMALE, ESTROGEN RECEPTOR NEGATIVE: Primary | ICD-10-CM

## 2025-07-25 DIAGNOSIS — C50.211 MALIGNANT NEOPLASM OF UPPER-INNER QUADRANT OF RIGHT BREAST IN FEMALE, ESTROGEN RECEPTOR NEGATIVE: Primary | ICD-10-CM

## 2025-07-25 PROCEDURE — 99999 PR PBB SHADOW E&M-EST. PATIENT-LVL IV: CPT | Mod: PBBFAC,,, | Performed by: NURSE PRACTITIONER

## 2025-07-25 RX ORDER — PROCHLORPERAZINE MALEATE 10 MG
10 TABLET ORAL EVERY 6 HOURS PRN
Qty: 30 TABLET | Refills: 1 | Status: SHIPPED | OUTPATIENT
Start: 2025-07-25 | End: 2026-07-25

## 2025-07-25 RX ORDER — ONDANSETRON 8 MG/1
8 TABLET, FILM COATED ORAL EVERY 8 HOURS PRN
Qty: 30 TABLET | Refills: 2 | Status: SHIPPED | OUTPATIENT
Start: 2025-07-25 | End: 2026-07-25

## 2025-07-25 NOTE — PROGRESS NOTES
FOLLOW-UP APPOINTMENT    PATIENT:   Essence Herrera  :    1950  MR#:    [id number]  DATE OF VISIT:  2025      Chief Complaint:  Breast cancer    HPI:   Ms. Essence Herrera presents today for chemotherapy education.  She will be starting treatment with Xeloda 2000 mg p.o. b.i.d. for the above diagnosis.       Review of Systems   Constitutional:  Positive for fatigue.   HENT:  Negative.     Eyes: Negative.    Respiratory: Negative.     Cardiovascular: Negative.    Gastrointestinal: Negative.    Endocrine: Negative.    Genitourinary: Negative.     Musculoskeletal: Negative.    Neurological: Negative.    Hematological: Negative.    Psychiatric/Behavioral: Negative.       Oncology History   Malignant neoplasm of upper-inner quadrant of right breast in female, estrogen receptor negative   10/28/2024 Cancer Staged    Staging form: Breast, AJCC 8th Edition  - Clinical stage from 10/28/2024: Stage IB (cT1c, cN0, cM0, G2, ER-, MI-, HER2-)     2024 Initial Diagnosis    Malignant neoplasm of upper-outer quadrant of left breast in female, estrogen receptor negative     2025 -  Chemotherapy    Treatment Summary   Plan Name: OP BREAST DOSE-DENSE AC-T (DOXORUBICIN CYCLOPHOSPHAMIDE Q2W FOLLOWED BY PACLITAXEL Q2W)  Treatment Goal: Curative  Status: Active  Start Date: 2025  End Date: 2025 (Planned)  Provider: Aurash Khoobehi, MD  Chemotherapy: DOXOrubicin chemo injection 132 mg, 60 mg/m2 = 132 mg, Intravenous, Clinic/HOD 1 time, 4 of 4 cycles  Administration: 132 mg (2025), 130 mg (2025), 130 mg (2025), 128 mg (3/13/2025)  cycloPHOSphamide 600 mg/m2 = 1,310 mg in 0.9% NaCl 298.1 mL chemo infusion, 600 mg/m2 = 1,320 mg, Intravenous, Clinic/HOD 1 time, 4 of 4 cycles  Administration: 1,310 mg (2025), 1,310 mg (2025), 1,300 mg (2025), 1,280 mg (3/13/2025)  PACLitaxeL (TAXOL) 175 mg/m2 = 366 mg in 0.9% NaCl 500 mL chemo infusion, 175 mg/m2 = 366 mg, Intravenous, Clinic/HOD 1  time, 4 of 4 cycles  Dose modification: 157.5 mg/m2 (original dose 175 mg/m2, Cycle 6, Reason: MD Discretion, Comment: Neuropathy), 157.5 mg/m2 (original dose 175 mg/m2, Cycle 7, Reason: Other (see comments), Comment: continue dose reduction from cycle 6), 140 mg/m2 (original dose 175 mg/m2, Cycle 8, Reason: MD Discretion)  Administration: 366 mg (3/27/2025), 324 mg (4/10/2025), 324 mg (4/24/2025), 276 mg (5/8/2025)         Problem List[1]    Past Medical History:   Diagnosis Date    Hyperlipidemia     Hypertension     Neuropathy        Past Surgical History:   Procedure Laterality Date    BUNIONECTOMY Bilateral 1996    CARPAL TUNNEL RELEASE Bilateral 1998    DILATION AND CURETTAGE OF UTERUS      EXCISION, LESION, BREAST, WITH NEEDLE LOCALIZATION Right 6/30/2025    Procedure: EXCISION, LESION, BREAST, WITH NEEDLE LOCALIZATION;  Surgeon: Juaquin Sanchez Jr., MD;  Location: [facility] OR;  Service: General;  Laterality: Right;    HYSTERECTOMY      TVH with conservation of the ovaries    INJECTION FOR SENTINEL NODE IDENTIFICATION Right 6/30/2025    Procedure: INJECTION, FOR SENTINEL NODE IDENTIFICATION;  Surgeon: Juaquin Sanchez Jr., MD;  Location: [facility] OR;  Service: General;  Laterality: Right;  SENTINEL INJ @ 9A    INSERTION OF TUNNELED CENTRAL VENOUS CATHETER (CVC) WITH SUBCUTANEOUS PORT Right 12/16/2024    Procedure: [id number];  Surgeon: Juaquin Sanchez Jr., MD;  Location: [facility] OR;  Service: General;  Laterality: Right;    NEEDLE LOCALIZATION Right 6/30/2025    Procedure: NEEDLE LOCALIZATION;  Surgeon: Juaquin Sanchez Jr., MD;  Location: [facility] OR;  Service: General;  Laterality: Right;  MAMMO @ 8A    PHLEBOGRAPHY N/A 5/23/2025    Procedure: Venogram;  Surgeon: Khoobehi, Ali, MD;  Location: [facility] CATH/EP LAB;  Service: Vascular;  Laterality: N/A;    ROTATOR CUFF REPAIR Right 2005    SENTINEL LYMPH NODE BIOPSY Right 6/30/2025    Procedure: BIOPSY, SENTINEL LYMPH NODE;  Surgeon:  "Juaquin Sanchez Jr., MD;  Location: [facility];  Service: General;  Laterality: Right;    TUBAL LIGATION         Social History[2]    Family History   Problem Relation Name Age of Onset    Breast cancer Maternal Grandfather      Breast cancer Maternal Aunt         Current Medications[3]    Review of patient's allergies indicates:  No Known Allergies    Physcial Examination  VITAL SIGNS:    Body surface area is 1.99 meters squared.   Pain Assessment: No pain   Vitals:    07/25/25 1343   TempSrc: Temporal   Height: 5' 5" (1.651 m)          Wt Readings from Last 5 Encounters:   07/15/25 86.4 kg (190 lb 7.6 oz)   06/30/25 83.9 kg (185 lb)   06/25/25 83.9 kg (185 lb)   05/26/25 84.5 kg (186 lb 4.6 oz)   05/23/25 86.4 kg (190 lb 7.6 oz)       GENERAL:  Essence Herrera is healthy-appearing 75 y.o. female, in no distress.   EYES:   Pupils equal, round, reactive.  Conjunctivae, sclera and lids normal.  HEENT: Head normocephalic and atraumatic, without alopecia.  Oropharynx is unremarkable.  No icterus, jaundice, stomatitis, mucositis, or ulceration is noted.  Ears are clear and unremarkable.  Nose, nares, and septum are unremarkable.    NECK:   No masses.  Thyroid and trachea are normal.    BREASTS:  Deferred.  RESPIRATORY: Clear to auscultation bilaterally.  Symmetrically effortless expansion.  No wheezing and no stridor.    CV: Heart reveals regular rate and rhythm without murmur, rub, or gallops.  ABDOMEN: Soft, non-tender.  No masses, no hernias, and no rebound or rigidity are noted.  /RECTAL:  Deferred.  LYMPHATICS: No preauricular, submandibular, cervical, supraclavicular, axillary, lymphadenopathy.  MUSCULOSKELETAL:Fair musculature, no atrophy.  No arthritic changes.  No edema or cyanosis. Back is without gross abnormal curvature.   NEUROLOGICAL: Cranial nerves II-XII grossly intact.  Motor and sensory exam intact.  SKIN:   No lesions, bruises, petechiae or rashes.  Good turgor.    PSYCHIATRIC: Patient is alert " and oriented to time, place and person.  Mood and affect are appropriate.         Laboratory and Radiology   Lab Results   Component Value Date    WBC 4.23 06/26/2025    RBC 3.87 (L) 06/26/2025    HGB 12.0 06/26/2025    HCT 37.5 06/26/2025    MCV 97 06/26/2025    MCH 31.0 06/26/2025    MCHC 32.0 06/26/2025    RDW 14.9 (H) 06/26/2025     06/26/2025    MPV 10.8 06/26/2025    GRAN 2.3 12/16/2024    GRAN 44.3 12/16/2024    LYMPH 0.7 05/05/2025    MONO 4 05/05/2025    MONO 0.7 05/05/2025    EOS 0.2 05/05/2025    BASO 0.2 05/05/2025    EOSINOPHIL 1 05/05/2025    BASOPHIL 1 05/05/2025     BMP  Lab Results   Component Value Date     06/30/2025    K 3.9 06/30/2025     06/30/2025    CO2 28 06/30/2025    BUN 10 06/30/2025    CREATININE 0.8 06/30/2025    CALCIUM 10.0 06/30/2025    ANIONGAP 7 (L) 06/30/2025    ESTGFRAFRICA >60.0 03/02/2021    EGFRNONAA >60.0 03/02/2021     Lab Results   Component Value Date    ALT 17 06/25/2025    AST 19 06/25/2025    ALKPHOS 80 06/25/2025    BILITOT 0.5 06/25/2025     Results for orders placed or performed during the hospital encounter of 05/22/25 (from the past 2160 hours)   CTA Chest Non-Coronary (PE Studies)    Narrative    EXAMINATION:  CTA CHEST NON CORONARY (PE STUDIES)    CLINICAL HISTORY:  Pulmonary embolism (PE) suspected, high prob;    TECHNIQUE:  Low dose axial images, sagittal and coronal reformations were obtained from the thoracic inlet to the lung bases following the IV administration of 100 mL of Omnipaque 350.  Contrast timing was optimized to evaluate the pulmonary arteries.  MIP images were performed.    COMPARISON:  04/01/2025    FINDINGS:  Extensive bilateral multifocal pulmonary thromboembolic disease with saddle pulmonary embolus.  No evidence of right heart strain.  Airspace disease peripheral aspect right upper, middle, and lower lobe, likely pulmonary infarct.  Follow-up to resolution.    No cardiomegaly or pericardial effusion.  Moderate coronary  artery atherosclerosis.  Normal caliber aorta.    Shotty mediastinal lymphadenopathy, nonspecific.    T8 vertebral body hemangioma.  Endplate sclerosis T10 and T11 with new vacuum phenomenon T10-11.  Chronic compression fracture deformity L1..    Possible cholelithiasis.  Nonspecific hypodense lesion right hepatic lobe.      Impression    Extensive bilateral multifocal pulmonary thromboembolic disease with saddle pulmonary embolus.  No evidence of right heart strain.  Airspace disease peripheral aspect right upper, middle, and lower lobe, likely pulmonary infarct. Follow-up to resolution.  Notified ordering physician of critical findings at time of dictation.    Other findings above      Electronically signed by: Harika Amaya  Date:    05/22/2025  Time:    19:56     No results found for this or any previous visit (from the past 2160 hours).  Results for orders placed or performed during the hospital encounter of 06/09/25 (from the past 2160 hours)   MRI Breast w/wo Contrast, w/CAD, Bilateral    Impression    Substantial decrease in size of biopsy-proven malignant mass in the upper inner quadrant of the right breast.    BI-RADS CATEGORY 6: KNOWN MALIGNANCY.      Electronically signed by: Darío Ho  Date:    06/09/2025  Time:    15:09       Pathology  Pathology Results  (Last 10 years)                 10/25/22 0950  Liquid-Based Pap Smear, Screening Final result    Narrative:  Release to patient->Immediate               TITLE: PLAN OF CARE FOR THE CHEMOTHERAPY PATIENT / TEACHING PROTOCOL    PURPOSE: To involve the patient / significant other in the plan of care and to provide teaching to the significant other & patient receiving chemotherapy.    LEVEL: Independent.    CONTENT: The Plan of Care for the chemotherapy patient is individualized and appropriate to the patients needs, strengths, limitations, & goals.  Education includes information regarding chemotherapy side effects, the treatment itself, and  self-care  Activities.    GOAL / OUTCOME STANDARDS    PHYSIOLOGIC: The client will remain free or experience minimal side effects or toxicities throughout the chemotherapy treatment period.     PSYCHOLOGIC: The client/significant others will demonstrate positive coping mechanisms in relation to chemotherapy and its side effects.      COGINITIVE: The client/significant others will verbalize understanding of self-care measure to avoid/minimize side effects of the chemotherapy regime.    EVALUATION / COMMENT KEY:    V = Audiovisual/Video  S = Successfully meets outcome  N = Needs further instruction  NA = Not applicable to the patient  P = Previous knowledge  U = Unable to comprehend  * = See progress notes          PLAN OF CARE  INFORMATION TO BE DELIVERED / NURSING INTERVENTIONS DATE EVALUATION   Assessment of client/caregiver,         knowledge of cancer diagnosis,         and chemotherapy as a treatment. 1a. Evaluate patient/caregiver learning ability    b. Plan teaching sessions with patient/caregiver according to needs and present anxiety level/ability to learn.    c. Provide Chemotherapy Education Packet,        Mouth Care Protocol,         Specific Patient Education Sheets. 07/25/2025 S   Individual chemotherapy treatment         plan. 2a. Review of Chemotherapy Education handout from [email or web address]            07/25/2025   S   Knowledge Deficit & Self-Management of general side effects common to all chemotherapy:  Nausea/Vomiting  b.   Diarrhea  Mouth Care  Dental care  Constipation  Hair Loss  Potential for infection  Fatigue   3a. Reinforce that the majority of side effects from chemotherapy are reversible and are  controlled both in the hospital and at home        (blood counts recover, hair grows back).   b.  Refer to the following for reinforcement of         information post-treatment:  Mouth Care Protocol.  Bowel Protocol for constipation or diarrhea.  3.  Drug Specific Chemotherapy Information Sheets for  each medication patient receiving.    07/25/2025     S     PLAN OF CARE  INFORMATION TO BE DELIVERED / NURSING INTERVENTIONS DATE EVALUATION   h. Potential for bleeding         i. Potential anemia/fatigue         j. Potential sunburn         k. Birth control measures  l. Safety measures post treatment 4.  Chemotherapy Home Care Instruction  and Safety Information Sheet.  A. patient/caregivers to thoroughly cook shellfish (shrimp, crab, etc) to decrease the chance of infection.    B.  Use sunscreen and protective clothing while in the sun.   07/25/2025      Knowledge deficit & Self Management of Drug Specific  Side Effects.    BLADDER EFFECTS        (Hemorrhagic Cystitis)                  Preventable with adequate hydration; occurs 2-3 days or more post treatment.   1.  Instruct patient to:  a.   Void at least every 2 hours; increase intake.  b.   DO NOT hold urine; go when urge is felt.  c.    Empty bladder at bedtime and on         awakening.  d.   Observe for color changes (red to tea           colored), amount and frequency changes.  e.   Notify oncologist of any abnormalities           in urine or voiding or if you cannot               drink adequate fluids.   07/25/2025   S   b.   CHANGES IN URINE        COLOR:      1.   Instruct patient:  a.   Most evident in first 2-3 voidings after           administration.  Lasts less than 24 hours.  If urine is discolored 2 or more days post- treatment, notify oncologist.      07/25/2025 S   c.    KIDNEY EFFECTS           (Nephrotoxicity)   1.  Instruct patient to:  a.   Drink 8-16 glasses of fluid/day the day   pre-treatment and 3-4 days post-treatment to maintain hydration; the best way to minimize kidney problems.  b.   Notify oncologist immediately if unable to drink fluids or if changes are noted in urinary elimination.     07/25/2025   S   PULMONARY TOXICITY    Instruct patient to report symptoms such as shortness of breath, chest pain, shallow breathing, or chest  wall discomfort to physician.  Reinforce preventative measures used by the health care team.  Baseline and periodic PFT and chest x-ray.   07/25/2025   S     PLAN OF CARE INFORMATION TO BE DELIVERED / NURSING INTERVENTIONS DATE EVALUATION   NERVE & MUSCLE EFFECTS (neurotoxocity; neuropathy, possible visual/hearing changes)        Instruct patient to:    Report numbness or tingling of the hands/feet, loss of fine motor movement (buttoning shirt, tying shoelaces), or gait changes to your oncologist.  If numbness/tingling are present:  protect feet with shoes at all times.  Use gloves for washing dishes/gardening & potholders in kitchen.       07/25/2025   S   CARDIOTOXICITY  Decreased effectiveness of             cardiac function. Effective are                  cumulative and irreversible.                                    CARDIAC ARRYTHMIAS              4   Instruct:  Heart function may be tested before treatment and perdiocally during treatment.  Notify oncologist of irregular pulse, palpitations, shortness of breath, or swelling in lower extremities/feet.          Taxol and Taxotere can cause arrhythmias on infusion that resolve once infusion discontinued. Instruct nurse if any irregularity felt.    07/25/2025   S   EXTRAVASTION  Occurs when vesicants leak outside of vein and cause damage to the skin and underlying tissues.   Reinforce preventive measures used to avoid complications.  Fresh IV site or central line monitored continuously with vesicant IVP.  Continuous infusion via central line site and blood return monitored periodically around the clock.  Instruct to:  Notify nurse of any discomfort, burning, stinging, etc. at IV site during chemotherapy administration.  Notify oncologist of any redness, pain, or swelling at IV site after discharge from hospital.   07/25/2025   S   HYPERSENSITIVITY can happen with any medication.   Instruct patient:  Nurse is with them during the initial part of treatment and will  be close by to monitor.  Pre-medication ordered by the oncologist must be taken on time. If doses are missed, treatment will need to be re-scheduled.  Skin redness, itching, or hives appearing after discharge should be reported to oncologist. 07/25/2025   S       PLAN OF CARE INFORMATION TO BE DELIVERED / NURSING INTERVENTIONS DATE EVALUATION   FLU-LIKE SYNDROME      Instruct patient symptoms are hard to prevent and may include fever, shaking chills, muscle and body aches.  Taking prescribed medications from physician if needed.  Adequate fluids are important.    Reinforce the need to call if temperature is         elevated to 100.4 or more  07/25/2025   S   HAND-FOOT SYNDROME  causes painful, symmetric swelling and redness of palms and soles                  Instruct patient to report any numbness or tingling in the hands or feet.  Explain prevention techniques, such as     Use heavy moisturizers to lessen skin dryness and itching, but to avoid if skin is cracked or broken  Bathe in tepid water, use non-perfumed soap, and wash gently. Baths with oatmeal or diluted baking soda may be soothing.  Avoid tight fitting shoes and repetitive actions, such as rubbing hands or applying pressure to hands/feet.  Review measures to take should syndrome occur:  Cold compresses and elevation for          edema  Pain medications and other measures as ordered by oncologist.   4.   Syndrome resolves few weeks after therapy. 07/25/2025   S   5. DISCHARGE PLANNING /        EDUCATION 1.    Explain importance of compliance with follow- up  tests (CBC, CMP).  2.    Verify patient/caregiver know:  a.    Oncologists office phone number.  b.    Dates of follow-up appointments.  c.    Prescriptions given for nausea  3.   Review side effects to monitor and notify          oncologist about.  4.   Reinforce the need for patient and caregivers to:  a.    Review information given.  b.    Call oncologists office with questions          or  symptoms  5.   Provide [facility] Brochure make referrals if needed for financial or .   07/25/2025   S     PROGRESS NOTES:-I met with the patient today for chemotherapy education.  The patient will be starting treatment with Xeloda 2000 mg b.i.d. days 1 through 14 followed by 7 days off.  Special teaching and attention was used for explanation of oral chemotherapy is being taken at home.  Patient was instructed that he/she is the only person to handle this medication.  If the dose is missed please continue the next dose.  Do not change or alter the medication in any way.  We discussed the mechanism of action, potential side effects of this treatment as well as ways he can manage them at home.  Some of these side effects include but are not limited to fever, nausea, vomiting, decreased appetite, fatigue, weakness, cytopenias, myalgia/arthralgia, constipation, rash, neuropathy, elevated blood pressure, diarrhea, bleeding, headache, shortness of breath, nail changes, taste change, hair thinning/loss, mood disturbances, or edema.  Patient notified to call anytime 24/7 because there is a physician on call for any problems that may arise.  Patient also notified to report to [facility] if they cannot get in touch with a physician after hours.  The patient acknowledged full understanding of the risk, recommendations and plan.  Patient understands risks versus benefits of therapy and I have answered all of the patient's questions.  -special attention/education was provided on hand-foot syndrome       Assessment/Plan   Triple Negative breast cancer:   -draw PTX panel  -see MD once Xeloda is received      Medications Ordered:  Zofran 8mg 1 tab PO Q8h prn nausea  Compazine 10 mg PO Q4-6h prn nausea  Emla cream: Apply to port site 30min prior to treatment and cover with saran wrap  Claritin 10mg PO QD day of chemotherapy and for 5 days after Neulasta to help prevent bone pain  OTC NSAIDS  Take 2  PO QD day of and for 5 days after Neulasta to help prevent bone pain  OTC Imodium as directed  OTC MiraLax 17 g daily for constipation     * please notify clinic or report to the emergency department for fever of 100.4 grade  * avoid tobacco and alcohol use  * maintain a healthy diet and good oral hydration  * good blood pressure and blood sugar control is important.  Please keep all followups with your primary care provider  *good hand hygiene  * please call clinic with any questions or concerns  * please take all medications as directed     Patient is in agreement with the proposed treatment plan. All questions were answered to the patient's satisfaction. Patient knows to call clinic for any new or worsening symptoms and if anything is needed before the next clinic visit.    Kristi Mena NP-C  Hematology & Medical Oncology     Collaborating physician, Dr. Khoobehi.    50 minutes of total time spent on the encounter, which includes face-to-face time and non face-to-face time preparing to see the patient (e.g., review of tests), obtaining and/or reviewing separately obtain history, documenting clinical information in the electronic or other health record, independently interpreting results (not separately reported) and communicating results to the patient/family/caregiver or care coordination (not separately reported).  Electronically signed by: Kristi Mena NP-C                           [1]   Patient Active Problem List  Diagnosis    Pneumonia due to COVID-19 virus    Essential hypertension    Hyperlipidemia    Acute respiratory failure with hypoxia    Severe obesity (BMI 35.0-39.9) with comorbidity    Malignant neoplasm of upper-inner quadrant of right breast in female, estrogen receptor negative    Acute saddle pulmonary embolism without acute cor pulmonale    Acute deep vein thrombosis (DVT) of both lower extremities    Demand ischemia of myocardium    E. coli UTI    Acute deep vein thrombosis (DVT) of  proximal vein of left lower extremity    Hypokalemia   [2]   Social History  Socioeconomic History    Marital status:    Tobacco Use    Smoking status: Never    Smokeless tobacco: Never   Vaping Use    Vaping status: Never Used   Substance and Sexual Activity    Alcohol use: Not Currently    Drug use: Never    Sexual activity: Not Currently     Social Drivers of Health     Financial Resource Strain: Low Risk  (5/23/2025)    Overall Financial Resource Strain (CARDIA)     Difficulty of Paying Living Expenses: Not hard at all   Food Insecurity: No Food Insecurity (5/23/2025)    Hunger Vital Sign     Worried About Running Out of Food in the Last Year: Never true     Ran Out of Food in the Last Year: Never true   Transportation Needs: No Transportation Needs (5/23/2025)    PRAPARE - Transportation     Lack of Transportation (Medical): No     Lack of Transportation (Non-Medical): No   Physical Activity: Unknown (12/16/2024)    Exercise Vital Sign     Days of Exercise per Week: 0 days   Stress: No Stress Concern Present (5/23/2025)    [employer] - Occupational Stress Questionnaire     Feeling of Stress : Only a little   Housing Stability: Low Risk  (5/23/2025)    Housing Stability Vital Sign     Unable to Pay for Housing in the Last Year: No     Homeless in the Last Year: No   [3]   Current Outpatient Medications:     acetaminophen (TYLENOL) 500 MG tablet, Take 500 mg by mouth every 6 (six) hours as needed for Pain., Disp: , Rfl:     atorvastatin (LIPITOR) 10 MG tablet, Take 10 mg by mouth once daily., Disp: , Rfl:     capecitabine (XELODA) 500 MG Tab, Take 4 tablets (2,000 mg total) by mouth 2 (two) times daily., Disp: 112 tablet, Rfl: 7    enoxaparin (LOVENOX) 80 mg/0.8 mL Syrg, Inject into the skin. Started 6/26/25 (Patient not taking: Reported on 7/15/2025), Disp: , Rfl:     gabapentin (NEURONTIN) 100 MG capsule, Take 1 capsule (100 mg total) by mouth 3 (three) times daily., Disp:  90 capsule, Rfl: 2    HYDROcodone-acetaminophen (NORCO) 5-325 mg per tablet, Take 1 tablet by mouth every 6 (six) hours as needed for Pain., Disp: 20 tablet, Rfl: 0    LIDOcaine-prilocaine (EMLA) cream, Apply topically as needed (Apply to port site 30 minutes before access as needed)., Disp: 30 g, Rfl: 0    MOUNJARO 5 mg/0.5 mL PnIj, Inject 5 mg into the skin every 7 days. (Patient not taking: Reported on 7/15/2025), Disp: , Rfl:     [Paused] olmesartan-hydrochlorothiazide (BENICAR HCT) 20-12.5 mg per tablet, Take 1 tablet by mouth once daily. (Patient not taking: Reported on 5/22/2025), Disp: , Rfl:     ondansetron (ZOFRAN-ODT) 8 MG TbDL, Take 1 tablet (8 mg total) by mouth every 8 (eight) hours as needed (Nausea)., Disp: 60 tablet, Rfl: 5    prochlorperazine (COMPAZINE) 10 MG tablet, Take 1 tablet (10 mg total) by mouth every 6 (six) hours as needed., Disp: 30 tablet, Rfl: 1    zolpidem (AMBIEN) 10 mg Tab, Take 10 mg by mouth every evening., Disp: , Rfl:

## 2025-07-25 NOTE — TELEPHONE ENCOUNTER
I was asked to assist patient in getting a PgX panel.  I spoke to patient and provided her with the number to [facility].

## 2025-07-28 ENCOUNTER — SOCIAL WORK (OUTPATIENT)
Dept: HEMATOLOGY/ONCOLOGY | Facility: CLINIC | Age: 75
End: 2025-07-28
Payer: MEDICARE

## 2025-07-28 NOTE — PROGRESS NOTES
"I called patient to inquire if she was able to call the [facility] office; she stated no and she decided over the weekend "she was taking those pills".  Secure chat message sent to Paloma Rivera RN.     "

## 2025-07-29 ENCOUNTER — TELEPHONE (OUTPATIENT)
Dept: HEMATOLOGY/ONCOLOGY | Facility: CLINIC | Age: 75
End: 2025-07-29
Payer: MEDICARE

## 2025-07-29 NOTE — TELEPHONE ENCOUNTER
"This nurse notified by JOSE Craig that she "just spoke to pt, she said she 'thought about it over the weekend and she isn't going to do the pills' and she didn't call [facility]." Dr Khoobehi aware. This nurse asked Dr Khoobehi what the next steps are for this pt.  "

## 2025-07-30 ENCOUNTER — TELEPHONE (OUTPATIENT)
Dept: HEMATOLOGY/ONCOLOGY | Facility: CLINIC | Age: 75
End: 2025-07-30
Payer: MEDICARE

## 2025-07-30 NOTE — TELEPHONE ENCOUNTER
Spoke with pt to notify of next scheduled appt.    ----- Message from Annmarie sent at 7/30/2025 11:25 AM CDT -----  Pt calling to schedule an appointment to talk to Dr. Khoobehi about the radiation treatments.     # [phone]

## 2025-07-30 NOTE — TELEPHONE ENCOUNTER
Spoke to pt to remind of appt tomorrow with Prerna Flores NP. Pt verbalized understanding and confirmed appt Location was discussed.

## 2025-07-31 ENCOUNTER — INFUSION (OUTPATIENT)
Dept: INFUSION THERAPY | Facility: HOSPITAL | Age: 75
End: 2025-07-31
Attending: INTERNAL MEDICINE
Payer: MEDICARE

## 2025-07-31 ENCOUNTER — OFFICE VISIT (OUTPATIENT)
Dept: HEMATOLOGY/ONCOLOGY | Facility: CLINIC | Age: 75
End: 2025-07-31
Payer: MEDICARE

## 2025-07-31 VITALS
RESPIRATION RATE: 17 BRPM | OXYGEN SATURATION: 98 % | HEIGHT: 65 IN | SYSTOLIC BLOOD PRESSURE: 134 MMHG | HEART RATE: 72 BPM | TEMPERATURE: 97 F | DIASTOLIC BLOOD PRESSURE: 71 MMHG | WEIGHT: 184 LBS | BODY MASS INDEX: 30.66 KG/M2

## 2025-07-31 VITALS
TEMPERATURE: 97 F | SYSTOLIC BLOOD PRESSURE: 134 MMHG | BODY MASS INDEX: 30.66 KG/M2 | HEIGHT: 65 IN | DIASTOLIC BLOOD PRESSURE: 71 MMHG | HEART RATE: 72 BPM | OXYGEN SATURATION: 98 % | WEIGHT: 184 LBS

## 2025-07-31 DIAGNOSIS — C50.211 MALIGNANT NEOPLASM OF UPPER-INNER QUADRANT OF RIGHT BREAST IN FEMALE, ESTROGEN RECEPTOR NEGATIVE: Primary | ICD-10-CM

## 2025-07-31 DIAGNOSIS — M54.59 OTHER LOW BACK PAIN: Primary | ICD-10-CM

## 2025-07-31 DIAGNOSIS — G62.0 CHEMOTHERAPY-INDUCED PERIPHERAL NEUROPATHY: ICD-10-CM

## 2025-07-31 DIAGNOSIS — Z17.1 MALIGNANT NEOPLASM OF UPPER-INNER QUADRANT OF RIGHT BREAST IN FEMALE, ESTROGEN RECEPTOR NEGATIVE: Primary | ICD-10-CM

## 2025-07-31 DIAGNOSIS — T45.1X5A CHEMOTHERAPY-INDUCED PERIPHERAL NEUROPATHY: ICD-10-CM

## 2025-07-31 DIAGNOSIS — G47.00 INSOMNIA, UNSPECIFIED TYPE: ICD-10-CM

## 2025-07-31 PROCEDURE — 96523 IRRIG DRUG DELIVERY DEVICE: CPT

## 2025-07-31 PROCEDURE — 3288F FALL RISK ASSESSMENT DOCD: CPT | Mod: CPTII,S$GLB,, | Performed by: NURSE PRACTITIONER

## 2025-07-31 PROCEDURE — 3078F DIAST BP <80 MM HG: CPT | Mod: CPTII,S$GLB,, | Performed by: NURSE PRACTITIONER

## 2025-07-31 PROCEDURE — 1159F MED LIST DOCD IN RCRD: CPT | Mod: CPTII,S$GLB,, | Performed by: NURSE PRACTITIONER

## 2025-07-31 PROCEDURE — 63600175 PHARM REV CODE 636 W HCPCS: Performed by: INTERNAL MEDICINE

## 2025-07-31 PROCEDURE — 99215 OFFICE O/P EST HI 40 MIN: CPT | Mod: S$GLB,,, | Performed by: NURSE PRACTITIONER

## 2025-07-31 PROCEDURE — 3075F SYST BP GE 130 - 139MM HG: CPT | Mod: CPTII,S$GLB,, | Performed by: NURSE PRACTITIONER

## 2025-07-31 PROCEDURE — 1160F RVW MEDS BY RX/DR IN RCRD: CPT | Mod: CPTII,S$GLB,, | Performed by: NURSE PRACTITIONER

## 2025-07-31 PROCEDURE — 25000003 PHARM REV CODE 250: Performed by: INTERNAL MEDICINE

## 2025-07-31 PROCEDURE — A4216 STERILE WATER/SALINE, 10 ML: HCPCS | Performed by: INTERNAL MEDICINE

## 2025-07-31 PROCEDURE — 1101F PT FALLS ASSESS-DOCD LE1/YR: CPT | Mod: CPTII,S$GLB,, | Performed by: NURSE PRACTITIONER

## 2025-07-31 PROCEDURE — 1125F AMNT PAIN NOTED PAIN PRSNT: CPT | Mod: CPTII,S$GLB,, | Performed by: NURSE PRACTITIONER

## 2025-07-31 PROCEDURE — 99999 PR PBB SHADOW E&M-EST. PATIENT-LVL IV: CPT | Mod: PBBFAC,,, | Performed by: NURSE PRACTITIONER

## 2025-07-31 RX ORDER — HEPARIN 100 UNIT/ML
500 SYRINGE INTRAVENOUS
OUTPATIENT
Start: 2025-07-31

## 2025-07-31 RX ORDER — SODIUM CHLORIDE 0.9 % (FLUSH) 0.9 %
10 SYRINGE (ML) INJECTION
Status: DISCONTINUED | OUTPATIENT
Start: 2025-07-31 | End: 2025-07-31 | Stop reason: HOSPADM

## 2025-07-31 RX ORDER — HEPARIN 100 UNIT/ML
500 SYRINGE INTRAVENOUS
Status: DISCONTINUED | OUTPATIENT
Start: 2025-07-31 | End: 2025-07-31 | Stop reason: HOSPADM

## 2025-07-31 RX ORDER — SODIUM CHLORIDE 0.9 % (FLUSH) 0.9 %
10 SYRINGE (ML) INJECTION
OUTPATIENT
Start: 2025-07-31

## 2025-07-31 RX ORDER — TRAZODONE HYDROCHLORIDE 100 MG/1
100 TABLET ORAL NIGHTLY
Qty: 30 TABLET | Refills: 1 | Status: SHIPPED | OUTPATIENT
Start: 2025-07-31 | End: 2026-07-31

## 2025-07-31 RX ADMIN — HEPARIN 500 UNITS: 100 SYRINGE at 03:07

## 2025-07-31 RX ADMIN — SODIUM CHLORIDE, PRESERVATIVE FREE 10 ML: 5 INJECTION INTRAVENOUS at 03:07

## 2025-07-31 NOTE — PROGRESS NOTES
Essence Herrera  75 y.o. is here to seek an integrative approach to discuss side effects related to breast cancer treatment. Essence Herrera  was referred by Dr. Khoobehi     HPI  Mrs. Herrera is here today with her daughter in law Pamela. She was diagnosed with breast cancer 10/2024 and started chemo which finished in May. She had a lumpectomy on 6/30/24. She has severe neuropathy to both feet and up to the knee and then her hands. She describes it as pins and needles, numb, and burning. She states it has not improved since finishing chemo. She reports low back pain when lying too long and rolling over in the bed. She uses a wheelchair for longer distances due to neuropathy and reports back pain when sitting in the chair. She also uses a walker and moves slowly. She is not sleeping well, does not nap. She denies fatigue but states she would like to sleep more. She denies anxiety or depression. She does not have a good appetite and when she does eat she eats small amounts.  Her intake has been improving and she previously had no appetite. She is drinking at least 1 protein drink daily. Her activity level is low, her grandson does stretch her legs and she pedals daily on a foot pedal.   She has 3 children and 7 grandchildren.     Pillars Assessment    Sleep  How many hours of sleep per night? 3-4 hours  Do you have trouble falling asleep, staying asleep or waking up earlier than you need to? yes  Do you have daytime fatigue? yes  Do you need medication for sleep? no  Do you use any supplements or other interventions for sleep? no    Resilience  Rate your current level of stress- low    Purpose  Do you feel you have a vision or a life purpose? Yes    Environment  Any exposures:no known exposures    Spirituality- Christianity    Nutrition   Food allergies or sensitivities: no  Do you adhere to a particular type of diet? no  Do you have any concerns with your eating habits? yes decreased appetite    Exercise  How would  "you describe your physical activity level? "Very low"    Past Medical History  Past Medical History:   Diagnosis Date    Hyperlipidemia     Hypertension     Neuropathy       Past Surgical History   Past Surgical History:   Procedure Laterality Date    BUNIONECTOMY Bilateral 1996    CARPAL TUNNEL RELEASE Bilateral 1998    DILATION AND CURETTAGE OF UTERUS      EXCISION, LESION, BREAST, WITH NEEDLE LOCALIZATION Right 6/30/2025    Procedure: EXCISION, LESION, BREAST, WITH NEEDLE LOCALIZATION;  Surgeon: Juaquin Sanchez Jr., MD;  Location: [facility] OR;  Service: General;  Laterality: Right;    HYSTERECTOMY      TVH with conservation of the ovaries    INJECTION FOR SENTINEL NODE IDENTIFICATION Right 6/30/2025    Procedure: INJECTION, FOR SENTINEL NODE IDENTIFICATION;  Surgeon: Juaquin Sanchez Jr., MD;  Location: [facility] OR;  Service: General;  Laterality: Right;  SENTINEL INJ @ 9A    INSERTION OF TUNNELED CENTRAL VENOUS CATHETER (CVC) WITH SUBCUTANEOUS PORT Right 12/16/2024    Procedure: [id number]-[id number];  Surgeon: Juaquin Sanchez Jr., MD;  Location: [facility] OR;  Service: General;  Laterality: Right;    NEEDLE LOCALIZATION Right 6/30/2025    Procedure: NEEDLE LOCALIZATION;  Surgeon: Juaquin Sanchez Jr., MD;  Location: [facility] OR;  Service: General;  Laterality: Right;  MAMMO @ 8A    PHLEBOGRAPHY N/A 5/23/2025    Procedure: Venogram;  Surgeon: Khoobehi, Ali, MD;  Location: [facility] CATH/EP LAB;  Service: Vascular;  Laterality: N/A;    ROTATOR CUFF REPAIR Right 2005    SENTINEL LYMPH NODE BIOPSY Right 6/30/2025    Procedure: BIOPSY, SENTINEL LYMPH NODE;  Surgeon: Juaquin Sanchez Jr., MD;  Location: [facility] OR;  Service: General;  Laterality: Right;    TUBAL LIGATION        Family History   Family History   Problem Relation Name Age of Onset    Breast cancer Maternal Grandfather      Breast cancer Maternal Aunt        Allergies  Review of patient's allergies indicates:  No Known Allergies   Current " Medications:  Current Medications[1]     Review of Systems  Review of Systems   Constitutional: Negative.    HENT: Negative.     Eyes: Negative.    Respiratory: Negative.     Cardiovascular: Negative.    Gastrointestinal: Negative.    Genitourinary: Negative.    Musculoskeletal:  Positive for back pain.   Skin: Negative.    Neurological:  Positive for tingling.   Psychiatric/Behavioral:  The patient has insomnia.       Physical Exam      There were no vitals filed for this visit.  There is no height or weight on file to calculate BMI.  Physical Exam  Vitals reviewed.   Constitutional:       Appearance: Normal appearance.   Neurological:      Mental Status: She is alert.   Psychiatric:         Mood and Affect: Mood normal.         Behavior: Behavior normal.        ASSESSMENT :  1. Other low back pain    2. Insomnia, unspecified type    3. Chemotherapy-induced peripheral neuropathy      PLAN:  Reviewed all information discussed at today's visit and all questions were answered.    Counseled on healthy lifestyle and behavior modifications   processed foods and meat. Also encouraged wide variety of fruits and vegetables  Trazodone 100 mg nightly  Referral to Acupuncture  I explained acupuncture can reduce fatigue,  pain, and neuropathy. It can also assist with behavioral health such as depression and anxiety and improve overall sleep quality. Acupuncture helps improve overall symptoms from treatment.   Referral to psychology for CBT-I  Counseled on sleep hygiene: Recommended insight timer-breathing into sleep, progressive muscle relaxation, and guided meditations.   Recommended Dermavitality Neuropathy Support Cream  I discussed and recommended the following support services:  Chair Yoga I suggested Wenceslao Chi and/or Yoga as these practices reduce stress, increases flexibility and muscle strength, improves balance and promotes serenity in the power of movement to help fight disease and boost your immune system.   Music and  relaxation therapy and Meditation which can decrease stress by lowering blood pressure, slowing breathing, and helping you be more present in the moment. It improves sleep by relaxing the body and mind at the end of the day.Meditation also trains you how to focus on one thing at a time, improving concentration. It also promotes emotional well-being by decreasing depression and anxiety, and helping create a more positive outlook on life.  Art Therapy    Follow up with [facility] in 1 month to check on sleep    I spent a total of 44 minutes on the day of the visit.This includes face to face time and non-face to face time preparing to see the patient (eg, review of tests), obtaining and/or reviewing separately obtained history, documenting clinical information in the electronic or other health record, independently interpreting results and communicating results to the patient/family/caregiver, or care coordinator.       [1]   Current Outpatient Medications:     acetaminophen (TYLENOL) 500 MG tablet, Take 500 mg by mouth every 6 (six) hours as needed for Pain., Disp: , Rfl:     atorvastatin (LIPITOR) 10 MG tablet, Take 10 mg by mouth once daily., Disp: , Rfl:     capecitabine (XELODA) 500 MG Tab, Take 4 tablets (2,000 mg total) by mouth 2 (two) times daily., Disp: 112 tablet, Rfl: 7    enoxaparin (LOVENOX) 80 mg/0.8 mL Syrg, Inject into the skin. Started 6/26/25, Disp: , Rfl:     gabapentin (NEURONTIN) 100 MG capsule, Take 1 capsule (100 mg total) by mouth 3 (three) times daily., Disp: 90 capsule, Rfl: 2    HYDROcodone-acetaminophen (NORCO) 5-325 mg per tablet, Take 1 tablet by mouth every 6 (six) hours as needed for Pain., Disp: 20 tablet, Rfl: 0    LIDOcaine-prilocaine (EMLA) cream, Apply topically as needed (Apply to port site 30 minutes before access as needed)., Disp: 30 g, Rfl: 0    MOUNJARO 5 mg/0.5 mL PnIj, Inject 5 mg into the skin every 7 days., Disp: , Rfl:     [Paused]  olmesartan-hydrochlorothiazide (BENICAR HCT) 20-12.5 mg per tablet, Take 1 tablet by mouth once daily., Disp: , Rfl:     ondansetron (ZOFRAN) 8 MG tablet, Take 1 tablet (8 mg total) by mouth every 8 (eight) hours as needed., Disp: 30 tablet, Rfl: 2    ondansetron (ZOFRAN-ODT) 8 MG TbDL, Take 1 tablet (8 mg total) by mouth every 8 (eight) hours as needed (Nausea)., Disp: 60 tablet, Rfl: 5    prochlorperazine (COMPAZINE) 10 MG tablet, Take 1 tablet (10 mg total) by mouth every 6 (six) hours as needed., Disp: 30 tablet, Rfl: 1    prochlorperazine (COMPAZINE) 10 MG tablet, Take 1 tablet (10 mg total) by mouth every 6 (six) hours as needed., Disp: 30 tablet, Rfl: 1    zolpidem (AMBIEN) 10 mg Tab, Take 10 mg by mouth every evening., Disp: , Rfl:

## 2025-08-05 ENCOUNTER — TELEPHONE (OUTPATIENT)
Dept: SURGERY | Facility: CLINIC | Age: 75
End: 2025-08-05
Payer: MEDICARE

## 2025-08-05 NOTE — TELEPHONE ENCOUNTER
"Spoke to the patient"s daughter and rescheduled her appointment from 9/12 to 9/10 with Dr. Sanchez @1030 am  "

## 2025-08-19 ENCOUNTER — PATIENT MESSAGE (OUTPATIENT)
Dept: HEMATOLOGY/ONCOLOGY | Facility: CLINIC | Age: 75
End: 2025-08-19
Payer: MEDICARE

## 2025-08-25 ENCOUNTER — TELEPHONE (OUTPATIENT)
Dept: HEMATOLOGY/ONCOLOGY | Facility: CLINIC | Age: 75
End: 2025-08-25
Payer: MEDICARE

## 2025-08-25 ENCOUNTER — CLINICAL SUPPORT (OUTPATIENT)
Facility: HOSPITAL | Age: 75
End: 2025-08-25
Payer: MEDICARE

## 2025-08-25 DIAGNOSIS — G47.00 INSOMNIA, UNSPECIFIED TYPE: ICD-10-CM

## 2025-08-25 DIAGNOSIS — M54.59 OTHER LOW BACK PAIN: Primary | Chronic | ICD-10-CM

## 2025-08-25 DIAGNOSIS — G62.0 CHEMOTHERAPY-INDUCED PERIPHERAL NEUROPATHY: ICD-10-CM

## 2025-08-25 DIAGNOSIS — T45.1X5A CHEMOTHERAPY-INDUCED PERIPHERAL NEUROPATHY: ICD-10-CM

## 2025-08-25 PROCEDURE — 97811 ACUP 1/> W/O ESTIM EA ADD 15: CPT | Mod: PO

## 2025-08-25 PROCEDURE — 97810 ACUP 1/> WO ESTIM 1ST 15 MIN: CPT | Mod: PO

## 2025-08-26 ENCOUNTER — DOCUMENTATION ONLY (OUTPATIENT)
Dept: HEMATOLOGY/ONCOLOGY | Facility: CLINIC | Age: 75
End: 2025-08-26
Payer: MEDICARE

## 2025-08-26 ENCOUNTER — OFFICE VISIT (OUTPATIENT)
Dept: HEMATOLOGY/ONCOLOGY | Facility: CLINIC | Age: 75
End: 2025-08-26
Payer: MEDICARE

## 2025-08-26 VITALS
DIASTOLIC BLOOD PRESSURE: 57 MMHG | SYSTOLIC BLOOD PRESSURE: 107 MMHG | HEART RATE: 61 BPM | TEMPERATURE: 96 F | OXYGEN SATURATION: 98 %

## 2025-08-26 DIAGNOSIS — Z17.1 MALIGNANT NEOPLASM OF UPPER-INNER QUADRANT OF RIGHT BREAST IN FEMALE, ESTROGEN RECEPTOR NEGATIVE: ICD-10-CM

## 2025-08-26 DIAGNOSIS — C50.211 MALIGNANT NEOPLASM OF UPPER-INNER QUADRANT OF RIGHT BREAST IN FEMALE, ESTROGEN RECEPTOR NEGATIVE: ICD-10-CM

## 2025-08-26 DIAGNOSIS — G62.0 CHEMOTHERAPY-INDUCED PERIPHERAL NEUROPATHY: Primary | ICD-10-CM

## 2025-08-26 DIAGNOSIS — T45.1X5A CHEMOTHERAPY-INDUCED PERIPHERAL NEUROPATHY: Primary | ICD-10-CM

## 2025-08-26 PROCEDURE — 99214 OFFICE O/P EST MOD 30 MIN: CPT | Mod: S$GLB,,, | Performed by: NURSE PRACTITIONER

## 2025-08-26 PROCEDURE — 3078F DIAST BP <80 MM HG: CPT | Mod: CPTII,S$GLB,, | Performed by: NURSE PRACTITIONER

## 2025-08-26 PROCEDURE — 1160F RVW MEDS BY RX/DR IN RCRD: CPT | Mod: CPTII,S$GLB,, | Performed by: NURSE PRACTITIONER

## 2025-08-26 PROCEDURE — 99999 PR PBB SHADOW E&M-EST. PATIENT-LVL III: CPT | Mod: PBBFAC,,, | Performed by: NURSE PRACTITIONER

## 2025-08-26 PROCEDURE — 1159F MED LIST DOCD IN RCRD: CPT | Mod: CPTII,S$GLB,, | Performed by: NURSE PRACTITIONER

## 2025-08-26 PROCEDURE — 3074F SYST BP LT 130 MM HG: CPT | Mod: CPTII,S$GLB,, | Performed by: NURSE PRACTITIONER

## (undated) DEVICE — CATH ANGLED GLIDE CATH 5FR

## (undated) DEVICE — SOL NACL IRR 1000ML BTL

## (undated) DEVICE — SUT VICRYL PLUS 3-0 SH 18IN

## (undated) DEVICE — KIT MICROINTRODUCE MINI 5X10CM

## (undated) DEVICE — TRAY GENERAL SURGERY SMH

## (undated) DEVICE — NDL SAFETY 25G X 1.5 ECLIPSE

## (undated) DEVICE — PROBE TRUNODE GAMMA HAND PIECE

## (undated) DEVICE — GLOVE SENSICARE PI SURG 7

## (undated) DEVICE — NDL ECLIPSE SAFETY 23G 1.5IN

## (undated) DEVICE — COVER CAMERA OPERATING ROOM

## (undated) DEVICE — DRAPE C ARM 42 X 120 10/BX

## (undated) DEVICE — VTE FLOWTRIEVER PPP

## (undated) DEVICE — SUT VICRYL 3-0 27 SH

## (undated) DEVICE — ELECTRODE REM PLYHSV RETURN 9

## (undated) DEVICE — CATH DXTERITY PG 145 110CM 5FR

## (undated) DEVICE — SYR DISP LL 5CC

## (undated) DEVICE — COVER LIGHT HANDLE 80/CA

## (undated) DEVICE — DRESSING TRANS 4X4 3/4

## (undated) DEVICE — SHEATH INTRI24 INTRO 24FR 33CM

## (undated) DEVICE — CONTAINER SPECIMEN OR STER 4OZ

## (undated) DEVICE — DRAPE T THYROID STERILE

## (undated) DEVICE — DECANTER FLUID TRNSF WHITE 9IN

## (undated) DEVICE — GUIDEWIRE INQWIRE SE 3MM JTIP

## (undated) DEVICE — ADHESIVE DERMABOND MINI HV

## (undated) DEVICE — ELECTRODE BLD 1 INCH TEFLON

## (undated) DEVICE — SUT MCRYL PLUS 4-0 PS2 27IN

## (undated) DEVICE — PACK MINOR SMH

## (undated) DEVICE — CHLORAPREP 10.5 ML APPLICATOR

## (undated) DEVICE — VISIPAQUE CONTRAST 320MG/100ML

## (undated) DEVICE — SPONGE COTTON WOVEN 4X4IN

## (undated) DEVICE — ELECTRODE MEGADYNE RETURN DUAL

## (undated) DEVICE — SHEATH INTRO PINNACLE 6F 10CM

## (undated) DEVICE — DRAPE T TRNSVRS LAP 102X78X121

## (undated) DEVICE — ADHESIVE DERMABOND ADVANCED

## (undated) DEVICE — BLADE SURG CARBON STEEL SZ11

## (undated) DEVICE — FILTER FLOWSAVER BLOOD RETURN